# Patient Record
Sex: FEMALE | Race: BLACK OR AFRICAN AMERICAN | Employment: FULL TIME | URBAN - METROPOLITAN AREA
[De-identification: names, ages, dates, MRNs, and addresses within clinical notes are randomized per-mention and may not be internally consistent; named-entity substitution may affect disease eponyms.]

---

## 2018-08-07 ENCOUNTER — APPOINTMENT (OUTPATIENT)
Dept: GENERAL RADIOLOGY | Age: 68
End: 2018-08-07
Attending: EMERGENCY MEDICINE
Payer: COMMERCIAL

## 2018-08-07 ENCOUNTER — APPOINTMENT (OUTPATIENT)
Dept: CT IMAGING | Age: 68
End: 2018-08-07
Attending: EMERGENCY MEDICINE
Payer: COMMERCIAL

## 2018-08-07 ENCOUNTER — HOSPITAL ENCOUNTER (EMERGENCY)
Age: 68
Discharge: HOME | End: 2018-08-08
Attending: EMERGENCY MEDICINE
Payer: COMMERCIAL

## 2018-08-07 DIAGNOSIS — N30.00 ACUTE CYSTITIS WITHOUT HEMATURIA: Primary | ICD-10-CM

## 2018-08-07 DIAGNOSIS — D72.829 LEUKOCYTOSIS, UNSPECIFIED TYPE: ICD-10-CM

## 2018-08-07 DIAGNOSIS — A59.9 TRICHIMONIASIS: ICD-10-CM

## 2018-08-07 DIAGNOSIS — R51.9 NONINTRACTABLE HEADACHE, UNSPECIFIED CHRONICITY PATTERN, UNSPECIFIED HEADACHE TYPE: ICD-10-CM

## 2018-08-07 LAB
ALBUMIN SERPL-MCNC: 3.4 G/DL (ref 3.5–5)
ALBUMIN/GLOB SERPL: 0.7 {RATIO} (ref 1.1–2.2)
ALP SERPL-CCNC: 128 U/L (ref 45–117)
ALT SERPL-CCNC: 36 U/L (ref 12–78)
ANION GAP SERPL CALC-SCNC: 11 MMOL/L (ref 5–15)
AST SERPL-CCNC: 27 U/L (ref 15–37)
BASOPHILS # BLD: 0 K/UL (ref 0–0.1)
BASOPHILS NFR BLD: 0 % (ref 0–1)
BILIRUB SERPL-MCNC: 1.1 MG/DL (ref 0.2–1)
BUN SERPL-MCNC: 12 MG/DL (ref 6–20)
BUN/CREAT SERPL: 13 (ref 12–20)
CALCIUM SERPL-MCNC: 9.2 MG/DL (ref 8.5–10.1)
CHLORIDE SERPL-SCNC: 101 MMOL/L (ref 97–108)
CO2 SERPL-SCNC: 25 MMOL/L (ref 21–32)
CREAT SERPL-MCNC: 0.96 MG/DL (ref 0.55–1.02)
DIFFERENTIAL METHOD BLD: ABNORMAL
EOSINOPHIL # BLD: 0 K/UL (ref 0–0.4)
EOSINOPHIL NFR BLD: 0 % (ref 0–7)
ERYTHROCYTE [DISTWIDTH] IN BLOOD BY AUTOMATED COUNT: 12.7 % (ref 11.5–14.5)
GLOBULIN SER CALC-MCNC: 4.8 G/DL (ref 2–4)
GLUCOSE SERPL-MCNC: 137 MG/DL (ref 65–100)
HCT VFR BLD AUTO: 41.9 % (ref 35–47)
HGB BLD-MCNC: 14.3 G/DL (ref 11.5–16)
IMM GRANULOCYTES # BLD: 0.1 K/UL (ref 0–0.04)
IMM GRANULOCYTES NFR BLD AUTO: 0 % (ref 0–0.5)
LACTATE SERPL-SCNC: 0.9 MMOL/L (ref 0.4–2)
LYMPHOCYTES # BLD: 1 K/UL (ref 0.8–3.5)
LYMPHOCYTES NFR BLD: 6 % (ref 12–49)
MCH RBC QN AUTO: 30.9 PG (ref 26–34)
MCHC RBC AUTO-ENTMCNC: 34.1 G/DL (ref 30–36.5)
MCV RBC AUTO: 90.5 FL (ref 80–99)
MONOCYTES # BLD: 1.3 K/UL (ref 0–1)
MONOCYTES NFR BLD: 9 % (ref 5–13)
NEUTS SEG # BLD: 13.2 K/UL (ref 1.8–8)
NEUTS SEG NFR BLD: 85 % (ref 32–75)
NRBC # BLD: 0 K/UL (ref 0–0.01)
NRBC BLD-RTO: 0 PER 100 WBC
PLATELET # BLD AUTO: 219 K/UL (ref 150–400)
PMV BLD AUTO: 11.5 FL (ref 8.9–12.9)
POTASSIUM SERPL-SCNC: 3.6 MMOL/L (ref 3.5–5.1)
PROT SERPL-MCNC: 8.2 G/DL (ref 6.4–8.2)
RBC # BLD AUTO: 4.63 M/UL (ref 3.8–5.2)
SODIUM SERPL-SCNC: 137 MMOL/L (ref 136–145)
UR CULT HOLD, URHOLD: NORMAL
WBC # BLD AUTO: 15.6 K/UL (ref 3.6–11)

## 2018-08-07 PROCEDURE — 70450 CT HEAD/BRAIN W/O DYE: CPT

## 2018-08-07 PROCEDURE — 96361 HYDRATE IV INFUSION ADD-ON: CPT

## 2018-08-07 PROCEDURE — 83605 ASSAY OF LACTIC ACID: CPT | Performed by: EMERGENCY MEDICINE

## 2018-08-07 PROCEDURE — 96375 TX/PRO/DX INJ NEW DRUG ADDON: CPT

## 2018-08-07 PROCEDURE — 74011250636 HC RX REV CODE- 250/636: Performed by: EMERGENCY MEDICINE

## 2018-08-07 PROCEDURE — 74011250637 HC RX REV CODE- 250/637: Performed by: EMERGENCY MEDICINE

## 2018-08-07 PROCEDURE — 36415 COLL VENOUS BLD VENIPUNCTURE: CPT | Performed by: EMERGENCY MEDICINE

## 2018-08-07 PROCEDURE — 85025 COMPLETE CBC W/AUTO DIFF WBC: CPT | Performed by: EMERGENCY MEDICINE

## 2018-08-07 PROCEDURE — 81001 URINALYSIS AUTO W/SCOPE: CPT | Performed by: EMERGENCY MEDICINE

## 2018-08-07 PROCEDURE — 80053 COMPREHEN METABOLIC PANEL: CPT | Performed by: EMERGENCY MEDICINE

## 2018-08-07 PROCEDURE — 96374 THER/PROPH/DIAG INJ IV PUSH: CPT

## 2018-08-07 PROCEDURE — 99284 EMERGENCY DEPT VISIT MOD MDM: CPT

## 2018-08-07 PROCEDURE — 71046 X-RAY EXAM CHEST 2 VIEWS: CPT

## 2018-08-07 PROCEDURE — 87086 URINE CULTURE/COLONY COUNT: CPT | Performed by: EMERGENCY MEDICINE

## 2018-08-07 PROCEDURE — 87040 BLOOD CULTURE FOR BACTERIA: CPT | Performed by: EMERGENCY MEDICINE

## 2018-08-07 PROCEDURE — 87186 SC STD MICRODIL/AGAR DIL: CPT | Performed by: EMERGENCY MEDICINE

## 2018-08-07 RX ORDER — BUTALBITAL, ACETAMINOPHEN AND CAFFEINE 50; 325; 40 MG/1; MG/1; MG/1
1 TABLET ORAL
Status: DISCONTINUED | OUTPATIENT
Start: 2018-08-07 | End: 2018-08-07

## 2018-08-07 RX ORDER — BUTALBITAL, ACETAMINOPHEN AND CAFFEINE 50; 325; 40 MG/1; MG/1; MG/1
2 TABLET ORAL
Status: COMPLETED | OUTPATIENT
Start: 2018-08-07 | End: 2018-08-07

## 2018-08-07 RX ORDER — SODIUM CHLORIDE 0.9 % (FLUSH) 0.9 %
5-10 SYRINGE (ML) INJECTION AS NEEDED
Status: DISCONTINUED | OUTPATIENT
Start: 2018-08-07 | End: 2018-08-08 | Stop reason: HOSPADM

## 2018-08-07 RX ORDER — ONDANSETRON 2 MG/ML
4 INJECTION INTRAMUSCULAR; INTRAVENOUS
Status: COMPLETED | OUTPATIENT
Start: 2018-08-07 | End: 2018-08-07

## 2018-08-07 RX ORDER — KETOROLAC TROMETHAMINE 30 MG/ML
15 INJECTION, SOLUTION INTRAMUSCULAR; INTRAVENOUS
Status: COMPLETED | OUTPATIENT
Start: 2018-08-07 | End: 2018-08-07

## 2018-08-07 RX ORDER — SODIUM CHLORIDE 0.9 % (FLUSH) 0.9 %
5-10 SYRINGE (ML) INJECTION EVERY 8 HOURS
Status: DISCONTINUED | OUTPATIENT
Start: 2018-08-07 | End: 2018-08-08 | Stop reason: HOSPADM

## 2018-08-07 RX ORDER — FEXOFENADINE HCL AND PSEUDOEPHEDRINE HCI 180; 240 MG/1; MG/1
1 TABLET, EXTENDED RELEASE ORAL DAILY
COMMUNITY
End: 2018-08-30

## 2018-08-07 RX ORDER — DEXAMETHASONE SODIUM PHOSPHATE 10 MG/ML
10 INJECTION INTRAMUSCULAR; INTRAVENOUS
Status: COMPLETED | OUTPATIENT
Start: 2018-08-07 | End: 2018-08-07

## 2018-08-07 RX ADMIN — KETOROLAC TROMETHAMINE 15 MG: 30 INJECTION, SOLUTION INTRAMUSCULAR at 22:36

## 2018-08-07 RX ADMIN — BUTALBITAL, ACETAMINOPHEN, AND CAFFEINE 2 TABLET: 50; 325; 40 TABLET ORAL at 23:25

## 2018-08-07 RX ADMIN — DEXAMETHASONE SODIUM PHOSPHATE 10 MG: 10 INJECTION, SOLUTION INTRAMUSCULAR; INTRAVENOUS at 22:36

## 2018-08-07 RX ADMIN — SODIUM CHLORIDE 1000 ML: 900 INJECTION, SOLUTION INTRAVENOUS at 22:32

## 2018-08-07 RX ADMIN — ONDANSETRON 4 MG: 2 INJECTION INTRAMUSCULAR; INTRAVENOUS at 22:36

## 2018-08-07 RX ADMIN — Medication 10 ML: at 22:32

## 2018-08-08 VITALS
TEMPERATURE: 100 F | DIASTOLIC BLOOD PRESSURE: 71 MMHG | HEART RATE: 86 BPM | RESPIRATION RATE: 16 BRPM | HEIGHT: 69 IN | BODY MASS INDEX: 31.74 KG/M2 | WEIGHT: 214.29 LBS | OXYGEN SATURATION: 94 % | SYSTOLIC BLOOD PRESSURE: 134 MMHG

## 2018-08-08 LAB
APPEARANCE UR: ABNORMAL
BACTERIA URNS QL MICRO: ABNORMAL /HPF
BILIRUB UR QL CFM: NEGATIVE
COLOR UR: ABNORMAL
EPITH CASTS URNS QL MICRO: ABNORMAL /LPF
GLUCOSE UR STRIP.AUTO-MCNC: NEGATIVE MG/DL
HGB UR QL STRIP: ABNORMAL
KETONES UR QL STRIP.AUTO: 40 MG/DL
LEUKOCYTE ESTERASE UR QL STRIP.AUTO: ABNORMAL
NITRITE UR QL STRIP.AUTO: NEGATIVE
PH UR STRIP: 5 [PH] (ref 5–8)
PROT UR STRIP-MCNC: 30 MG/DL
RBC #/AREA URNS HPF: ABNORMAL /HPF (ref 0–5)
SP GR UR REFRACTOMETRY: 1.02 (ref 1–1.03)
TRICHOMONAS UR QL MICRO: PRESENT
UROBILINOGEN UR QL STRIP.AUTO: 2 EU/DL (ref 0.2–1)
WBC URNS QL MICRO: ABNORMAL /HPF (ref 0–4)

## 2018-08-08 PROCEDURE — 74011250637 HC RX REV CODE- 250/637: Performed by: EMERGENCY MEDICINE

## 2018-08-08 PROCEDURE — 74011250636 HC RX REV CODE- 250/636: Performed by: EMERGENCY MEDICINE

## 2018-08-08 PROCEDURE — A4216 STERILE WATER/SALINE, 10 ML: HCPCS | Performed by: EMERGENCY MEDICINE

## 2018-08-08 PROCEDURE — 96375 TX/PRO/DX INJ NEW DRUG ADDON: CPT

## 2018-08-08 RX ORDER — CEPHALEXIN 500 MG/1
500 CAPSULE ORAL 3 TIMES DAILY
Qty: 21 CAP | Refills: 0 | Status: SHIPPED | OUTPATIENT
Start: 2018-08-08 | End: 2018-08-21

## 2018-08-08 RX ORDER — ONDANSETRON 4 MG/1
4 TABLET, ORALLY DISINTEGRATING ORAL
Qty: 20 TAB | Refills: 0 | Status: SHIPPED | OUTPATIENT
Start: 2018-08-08 | End: 2018-08-30

## 2018-08-08 RX ORDER — METRONIDAZOLE 250 MG/1
1000 TABLET ORAL
Status: COMPLETED | OUTPATIENT
Start: 2018-08-08 | End: 2018-08-08

## 2018-08-08 RX ORDER — BUTALBITAL, ACETAMINOPHEN AND CAFFEINE 300; 40; 50 MG/1; MG/1; MG/1
1 CAPSULE ORAL
Qty: 20 CAP | Refills: 0 | Status: SHIPPED | OUTPATIENT
Start: 2018-08-08 | End: 2018-08-30

## 2018-08-08 RX ADMIN — METRONIDAZOLE 1000 MG: 250 TABLET ORAL at 00:31

## 2018-08-08 RX ADMIN — CEFTRIAXONE SODIUM 1 G: 1 INJECTION, POWDER, FOR SOLUTION INTRAMUSCULAR; INTRAVENOUS at 00:31

## 2018-08-08 NOTE — ED TRIAGE NOTES
Headache x4 days with chills starting Saturday. Patient has tried  Taking advil and aleve for relief.

## 2018-08-08 NOTE — ED PROVIDER NOTES
HPI Comments: 76 y.o. female with no significant past medical history who presents from home via personal vehicle with chief complaint of headache. Pt reports to have a headache for 4 days. Pt notes some chills as well. Pt reports finally taking some Advil migraine with no relief today. Pt states she feels like she needs to throw up but cannot. Pt affirms cough, nausea, jaw pain, and vomiting. Pt denies fever, allergies, and abdominal pain. Pt notes she takes allegra regularly. There are no other acute medical concerns at this time. Social hx: never smoker. PCP: No primary care provider on file. Note written by hillary Rodriguez, as dictated by Joceline Horta DO 10:24 PM      The history is provided by the patient. No  was used. History reviewed. No pertinent past medical history. History reviewed. No pertinent surgical history. History reviewed. No pertinent family history. Social History     Social History    Marital status: SINGLE     Spouse name: N/A    Number of children: N/A    Years of education: N/A     Occupational History    Not on file. Social History Main Topics    Smoking status: Never Smoker    Smokeless tobacco: Never Used    Alcohol use No    Drug use: Not on file    Sexual activity: Not on file     Other Topics Concern    Not on file     Social History Narrative    No narrative on file     ALLERGIES: Morphine    Review of Systems   Constitutional: Positive for chills. Negative for appetite change, fever and unexpected weight change. HENT: Negative for ear pain, hearing loss, rhinorrhea and trouble swallowing. Jaw pain     Eyes: Negative for pain and visual disturbance. Respiratory: Positive for cough. Negative for chest tightness and shortness of breath. Cardiovascular: Negative for chest pain and palpitations. Gastrointestinal: Positive for nausea.  Negative for abdominal distention, abdominal pain, blood in stool and vomiting. Genitourinary: Negative for dysuria, hematuria and urgency. Musculoskeletal: Negative for back pain and myalgias. Skin: Negative for rash. Neurological: Positive for headaches. Negative for dizziness, syncope, weakness and numbness. Psychiatric/Behavioral: Negative for confusion and suicidal ideas. All other systems reviewed and are negative. Vitals:    08/07/18 2330 08/08/18 0001 08/08/18 0012 08/08/18 0050   BP: 134/71      Pulse: 86      Resp:       Temp:       SpO2: 98% 95% 95% 94%   Weight:       Height:                Physical Exam   Constitutional: She is oriented to person, place, and time. She appears well-developed and well-nourished. No distress. HENT:   Head: Normocephalic and atraumatic. Right Ear: External ear normal.   Left Ear: External ear normal.   Nose: Nose normal.   Mouth/Throat: Oropharynx is clear and moist. No oropharyngeal exudate. Dry barrington mucous. Eyes: Conjunctivae and EOM are normal. Pupils are equal, round, and reactive to light. Right eye exhibits no discharge. Left eye exhibits no discharge. No scleral icterus. Neck: Normal range of motion. Neck supple. No JVD present. No tracheal deviation present. No Brudzinski's sign and no Kernig's sign noted. Cardiovascular: Regular rhythm, normal heart sounds and intact distal pulses. Tachycardia present. Exam reveals no gallop and no friction rub. No murmur heard. Pulmonary/Chest: Effort normal and breath sounds normal. No stridor. No respiratory distress. She has no decreased breath sounds. She has no wheezes. She has no rhonchi. She has no rales. She exhibits no tenderness. Abdominal: Soft. Bowel sounds are normal. She exhibits no distension. There is no tenderness. There is no rebound and no guarding. Musculoskeletal: Normal range of motion. She exhibits no edema or tenderness. Neurological: She is alert and oriented to person, place, and time.  She has normal strength and normal reflexes. No cranial nerve deficit or sensory deficit. She exhibits normal muscle tone. Coordination normal. GCS eye subscore is 4. GCS verbal subscore is 5. GCS motor subscore is 6. No meningeal signs. Skin: Skin is warm and dry. No rash noted. She is not diaphoretic. No erythema. No pallor. Psychiatric: She has a normal mood and affect. Her behavior is normal. Judgment and thought content normal.   Nursing note and vitals reviewed. Note written by hillary Oconnor, as dictated by No att. providers found 10:24 PM    MDM  Number of Diagnoses or Management Options  Acute cystitis without hematuria:   Leukocytosis, unspecified type:   Nonintractable headache, unspecified chronicity pattern, unspecified headache type:   Trichimoniasis:      Amount and/or Complexity of Data Reviewed  Clinical lab tests: ordered and reviewed  Tests in the radiology section of CPT®: ordered and reviewed    Risk of Complications, Morbidity, and/or Mortality  Presenting problems: moderate  Diagnostic procedures: moderate  Management options: moderate    Patient Progress  Patient progress: improved        ED Course       Procedures    Chief Complaint   Patient presents with    Headache       The patient's presenting problems have been discussed, and they are in agreement with the care plan formulated and outlined with them. I have encouraged them to ask questions as they arise throughout their visit.     MEDICATIONS GIVEN:  Medications   sodium chloride (NS) flush 5-10 mL (10 mL IntraVENous Given 8/7/18 2232)   sodium chloride (NS) flush 5-10 mL (not administered)   sodium chloride 0.9 % bolus infusion 1,000 mL (0 mL IntraVENous IV Completed 8/8/18 0038)   ondansetron (ZOFRAN) injection 4 mg (4 mg IntraVENous Given 8/7/18 2236)   ketorolac (TORADOL) injection 15 mg (15 mg IntraVENous Given 8/7/18 2236)   dexamethasone (PF) (DECADRON) injection 10 mg (10 mg IntraVENous Given 8/7/18 2236) butalbital-acetaminophen-caffeine (FIORICET, ESGIC) -40 mg per tablet 2 Tab (2 Tabs Oral Given 8/7/18 2325)   cefTRIAXone (ROCEPHIN) 1 g in sterile water (preservative free) 10 mL IV syringe (1 g IntraVENous Given 8/8/18 0031)   metroNIDAZOLE (FLAGYL) tablet 1,000 mg (1,000 mg Oral Given 8/8/18 0031)       LABS REVIEWED:  Recent Results (from the past 24 hour(s))   CBC WITH AUTOMATED DIFF    Collection Time: 08/07/18 10:30 PM   Result Value Ref Range    WBC 15.6 (H) 3.6 - 11.0 K/uL    RBC 4.63 3.80 - 5.20 M/uL    HGB 14.3 11.5 - 16.0 g/dL    HCT 41.9 35.0 - 47.0 %    MCV 90.5 80.0 - 99.0 FL    MCH 30.9 26.0 - 34.0 PG    MCHC 34.1 30.0 - 36.5 g/dL    RDW 12.7 11.5 - 14.5 %    PLATELET 295 072 - 540 K/uL    MPV 11.5 8.9 - 12.9 FL    NRBC 0.0 0  WBC    ABSOLUTE NRBC 0.00 0.00 - 0.01 K/uL    NEUTROPHILS 85 (H) 32 - 75 %    LYMPHOCYTES 6 (L) 12 - 49 %    MONOCYTES 9 5 - 13 %    EOSINOPHILS 0 0 - 7 %    BASOPHILS 0 0 - 1 %    IMMATURE GRANULOCYTES 0 0.0 - 0.5 %    ABS. NEUTROPHILS 13.2 (H) 1.8 - 8.0 K/UL    ABS. LYMPHOCYTES 1.0 0.8 - 3.5 K/UL    ABS. MONOCYTES 1.3 (H) 0.0 - 1.0 K/UL    ABS. EOSINOPHILS 0.0 0.0 - 0.4 K/UL    ABS. BASOPHILS 0.0 0.0 - 0.1 K/UL    ABS. IMM. GRANS. 0.1 (H) 0.00 - 0.04 K/UL    DF AUTOMATED     METABOLIC PANEL, COMPREHENSIVE    Collection Time: 08/07/18 10:30 PM   Result Value Ref Range    Sodium 137 136 - 145 mmol/L    Potassium 3.6 3.5 - 5.1 mmol/L    Chloride 101 97 - 108 mmol/L    CO2 25 21 - 32 mmol/L    Anion gap 11 5 - 15 mmol/L    Glucose 137 (H) 65 - 100 mg/dL    BUN 12 6 - 20 MG/DL    Creatinine 0.96 0.55 - 1.02 MG/DL    BUN/Creatinine ratio 13 12 - 20      GFR est AA >60 >60 ml/min/1.73m2    GFR est non-AA 58 (L) >60 ml/min/1.73m2    Calcium 9.2 8.5 - 10.1 MG/DL    Bilirubin, total 1.1 (H) 0.2 - 1.0 MG/DL    ALT (SGPT) 36 12 - 78 U/L    AST (SGOT) 27 15 - 37 U/L    Alk.  phosphatase 128 (H) 45 - 117 U/L    Protein, total 8.2 6.4 - 8.2 g/dL    Albumin 3.4 (L) 3.5 - 5.0 g/dL    Globulin 4.8 (H) 2.0 - 4.0 g/dL    A-G Ratio 0.7 (L) 1.1 - 2.2     LACTIC ACID    Collection Time: 08/07/18 10:30 PM   Result Value Ref Range    Lactic acid 0.9 0.4 - 2.0 MMOL/L   URINALYSIS W/MICROSCOPIC    Collection Time: 08/07/18 11:46 PM   Result Value Ref Range    Color DARK YELLOW      Appearance CLOUDY (A) CLEAR      Specific gravity 1.022 1.003 - 1.030      pH (UA) 5.0 5.0 - 8.0      Protein 30 (A) NEG mg/dL    Glucose NEGATIVE  NEG mg/dL    Ketone 40 (A) NEG mg/dL    Blood SMALL (A) NEG      Urobilinogen 2.0 (H) 0.2 - 1.0 EU/dL    Nitrites NEGATIVE  NEG      Leukocyte Esterase LARGE (A) NEG      WBC 20-50 0 - 4 /hpf    RBC 0-5 0 - 5 /hpf    Epithelial cells MODERATE (A) FEW /lpf    Bacteria 1+ (A) NEG /hpf    Trichomonas PRESENT (A) NEG     URINE CULTURE HOLD SAMPLE    Collection Time: 08/07/18 11:46 PM   Result Value Ref Range    Urine culture hold        URINE ON HOLD IN MICROBIOLOGY DEPT FOR 3 DAYS. IF UNPRESERVED URINE IS SUBMITTED, IT CANNOT BE USED FOR ADDITIONAL TESTING AFTER 24 HRS, RECOLLECTION WILL BE REQUIRED. BILIRUBIN, CONFIRM    Collection Time: 08/07/18 11:46 PM   Result Value Ref Range    Bilirubin UA, confirm NEGATIVE  NEG         VITAL SIGNS:  Patient Vitals for the past 12 hrs:   Temp Pulse Resp BP SpO2   08/08/18 0050 - - - - 94 %   08/08/18 0012 - - - - 95 %   08/08/18 0001 - - - - 95 %   08/07/18 2330 - 86 - 134/71 98 %   08/07/18 2315 - - - 154/79 -   08/07/18 2300 - - - 156/86 -   08/07/18 2245 - - - 157/81 -   08/07/18 2236 100 °F (37.8 °C) - - - -   08/07/18 2210 100.2 °F (37.9 °C) (!) 121 16 178/81 96 %       RADIOLOGY RESULTS:  The following have been ordered and reviewed:  Xr Chest Pa Lat    Result Date: 8/8/2018  History: Fever, headache. Frontal and lateral views of the chest show clear lungs. There is mild cardiomegaly. There is atherosclerosis of the aorta. The bony thorax is unremarkable. IMPRESSION: No acute findings.      Ct Head Wo Cont    Result Date: 8/7/2018  EXAM:  CT HEAD WO CONT INDICATION:   headache for 4 cays with chills starting Saturday COMPARISON: None. CONTRAST:  None. TECHNIQUE: Unenhanced CT of the head was performed using 5 mm images. Brain and bone windows were generated. CT dose reduction was achieved through use of a standardized protocol tailored for this examination and automatic exposure control for dose modulation. FINDINGS: The ventricles and sulci are normal in size, shape and configuration and midline. There is no significant white matter disease. There is no intracranial hemorrhage, extra-axial collection, mass, mass effect or midline shift. The basilar cisterns are open. No acute infarct is identified. The bone windows demonstrate no abnormalities. The visualized portions of the paranasal sinuses and mastoid air cells are clear. IMPRESSION: Normal CT of the head. PROGRESS NOTES:  Pt feeling better. Will treat uti. Discussed results and plan with patient. Patient will be discharged home with PCP follow up. Pt given a list of PCP's in area. Patient instructed to return to the emergency room for any worsening symptoms or any other concerns. DIAGNOSIS:    1. Acute cystitis without hematuria    2. Nonintractable headache, unspecified chronicity pattern, unspecified headache type    3. Trichimoniasis    4.  Leukocytosis, unspecified type        PLAN:  Follow-up Information     Follow up With Details Comments Contact Info    None Schedule an appointment as soon as possible for a visit  None [phone]) Patient stated that they have no PCP      [facility] EMERGENCY DEPT  If symptoms worsen [address] [facility]  [phone]        Discharge Medication List as of 8/8/2018 12:46 AM      START taking these medications    Details   butalbital-acetaminophen-caff (FIORICET) -40 mg per capsule Take 1 Cap by mouth every four (4) hours as needed for Pain., Print, Disp-20 Cap, R-0      cephALEXin (KEFLEX) 500 mg capsule Take 1 Cap by mouth three (3) times daily for 7 days. , Print, Disp-21 Cap, R-0      ondansetron (ZOFRAN ODT) 4 mg disintegrating tablet Take 1 Tab by mouth every eight (8) hours as needed for Nausea. , Print, Disp-20 Tab, R-0         CONTINUE these medications which have NOT CHANGED    Details   fexofenadine-pseudoephedrine (ALLEGRA-D 24 HOUR) 180-240 mg per tablet Take 1 Tab by mouth daily. , Historical Med             ED COURSE: The patient's hospital course has been uncomplicated.

## 2018-08-08 NOTE — DISCHARGE INSTRUCTIONS
We hope that we have addressed all of your medical concerns. The examination and treatment you received in the Emergency Department were for an emergent problem and were not intended as complete care. It is important that you follow up with your healthcare provider(s) for ongoing care. If your symptoms worsen or do not improve as expected, and you are unable to reach your usual health care provider(s), you should return to the Emergency Department. Today's healthcare is undergoing tremendous change, and patient satisfaction surveys are one of the many tools to assess the quality of medical care. You may receive a survey from the [facility] regarding your experience in the Emergency Department. I hope that your experience has been completely positive, particularly the medical care that I provided. As such, please participate in the survey; anything less than excellent does not meet my expectations or intentions. [facility] and 8 [facility] participate in nationally recognized quality of care measures. If your blood pressure is greater than 120/80, as reported below, we urge that you seek medical care to address the potential of high blood pressure, commonly known as hypertension. Hypertension can be hereditary or can be caused by certain medical conditions, pain, stress, or \"white coat syndrome. \"       Please make an appointment with your health care provider(s) for follow up of your Emergency Department visit. VITALS:   Patient Vitals for the past 8 hrs:   Temp Pulse Resp BP SpO2   08/08/18 0012 - - - - 95 %   08/08/18 0001 - - - - 95 %   08/07/18 2330 - 86 - 134/71 98 %   08/07/18 2315 - - - 154/79 -   08/07/18 2300 - - - 156/86 -   08/07/18 2245 - - - 157/81 -   08/07/18 2236 100 °F (37.8 °C) - - - -   08/07/18 2210 100.2 °F (37.9 °C) (!) 121 16 178/81 96 %          Thank you for allowing us to provide you with medical care today. We realize that you have many choices for your emergency care needs. Please choose us in the future for any continued health care needs. Roddy Ramírez [address]   Office: [phone]            Recent Results (from the past 24 hour(s))   CBC WITH AUTOMATED DIFF    Collection Time: 08/07/18 10:30 PM   Result Value Ref Range    WBC 15.6 (H) 3.6 - 11.0 K/uL    RBC 4.63 3.80 - 5.20 M/uL    HGB 14.3 11.5 - 16.0 g/dL    HCT 41.9 35.0 - 47.0 %    MCV 90.5 80.0 - 99.0 FL    MCH 30.9 26.0 - 34.0 PG    MCHC 34.1 30.0 - 36.5 g/dL    RDW 12.7 11.5 - 14.5 %    PLATELET 351 077 - 191 K/uL    MPV 11.5 8.9 - 12.9 FL    NRBC 0.0 0  WBC    ABSOLUTE NRBC 0.00 0.00 - 0.01 K/uL    NEUTROPHILS 85 (H) 32 - 75 %    LYMPHOCYTES 6 (L) 12 - 49 %    MONOCYTES 9 5 - 13 %    EOSINOPHILS 0 0 - 7 %    BASOPHILS 0 0 - 1 %    IMMATURE GRANULOCYTES 0 0.0 - 0.5 %    ABS. NEUTROPHILS 13.2 (H) 1.8 - 8.0 K/UL    ABS. LYMPHOCYTES 1.0 0.8 - 3.5 K/UL    ABS. MONOCYTES 1.3 (H) 0.0 - 1.0 K/UL    ABS. EOSINOPHILS 0.0 0.0 - 0.4 K/UL    ABS. BASOPHILS 0.0 0.0 - 0.1 K/UL    ABS. IMM. GRANS. 0.1 (H) 0.00 - 0.04 K/UL    DF AUTOMATED     METABOLIC PANEL, COMPREHENSIVE    Collection Time: 08/07/18 10:30 PM   Result Value Ref Range    Sodium 137 136 - 145 mmol/L    Potassium 3.6 3.5 - 5.1 mmol/L    Chloride 101 97 - 108 mmol/L    CO2 25 21 - 32 mmol/L    Anion gap 11 5 - 15 mmol/L    Glucose 137 (H) 65 - 100 mg/dL    BUN 12 6 - 20 MG/DL    Creatinine 0.96 0.55 - 1.02 MG/DL    BUN/Creatinine ratio 13 12 - 20      GFR est AA >60 >60 ml/min/1.73m2    GFR est non-AA 58 (L) >60 ml/min/1.73m2    Calcium 9.2 8.5 - 10.1 MG/DL    Bilirubin, total 1.1 (H) 0.2 - 1.0 MG/DL    ALT (SGPT) 36 12 - 78 U/L    AST (SGOT) 27 15 - 37 U/L    Alk.  phosphatase 128 (H) 45 - 117 U/L    Protein, total 8.2 6.4 - 8.2 g/dL    Albumin 3.4 (L) 3.5 - 5.0 g/dL    Globulin 4.8 (H) 2.0 - 4.0 g/dL    A-G Ratio 0.7 (L) 1.1 - 2.2 LACTIC ACID    Collection Time: 08/07/18 10:30 PM   Result Value Ref Range    Lactic acid 0.9 0.4 - 2.0 MMOL/L   URINALYSIS W/MICROSCOPIC    Collection Time: 08/07/18 11:46 PM   Result Value Ref Range    Color DARK YELLOW      Appearance CLOUDY (A) CLEAR      Specific gravity 1.022 1.003 - 1.030      pH (UA) 5.0 5.0 - 8.0      Protein 30 (A) NEG mg/dL    Glucose NEGATIVE  NEG mg/dL    Ketone 40 (A) NEG mg/dL    Blood SMALL (A) NEG      Urobilinogen 2.0 (H) 0.2 - 1.0 EU/dL    Nitrites NEGATIVE  NEG      Leukocyte Esterase LARGE (A) NEG      WBC 20-50 0 - 4 /hpf    RBC 0-5 0 - 5 /hpf    Epithelial cells MODERATE (A) FEW /lpf    Bacteria 1+ (A) NEG /hpf    Trichomonas PRESENT (A) NEG     URINE CULTURE HOLD SAMPLE    Collection Time: 08/07/18 11:46 PM   Result Value Ref Range    Urine culture hold        URINE ON HOLD IN MICROBIOLOGY DEPT FOR 3 DAYS. IF UNPRESERVED URINE IS SUBMITTED, IT CANNOT BE USED FOR ADDITIONAL TESTING AFTER 24 HRS, RECOLLECTION WILL BE REQUIRED. BILIRUBIN, CONFIRM    Collection Time: 08/07/18 11:46 PM   Result Value Ref Range    Bilirubin UA, confirm NEGATIVE  NEG         Xr Chest Pa Lat    Result Date: 8/8/2018  History: Fever, headache. Frontal and lateral views of the chest show clear lungs. There is mild cardiomegaly. There is atherosclerosis of the aorta. The bony thorax is unremarkable. IMPRESSION: No acute findings. Ct Head Wo Cont    Result Date: 8/7/2018  EXAM:  CT HEAD WO CONT INDICATION:   headache for 4 cays with chills starting Saturday COMPARISON: None. CONTRAST:  None. TECHNIQUE: Unenhanced CT of the head was performed using 5 mm images. Brain and bone windows were generated. CT dose reduction was achieved through use of a standardized protocol tailored for this examination and automatic exposure control for dose modulation. FINDINGS: The ventricles and sulci are normal in size, shape and configuration and midline. There is no significant white matter disease.  There is no intracranial hemorrhage, extra-axial collection, mass, mass effect or midline shift. The basilar cisterns are open. No acute infarct is identified. The bone windows demonstrate no abnormalities. The visualized portions of the paranasal sinuses and mastoid air cells are clear. IMPRESSION: Normal CT of the head. Urinary Tract Infection in Women: Care Instructions  Your Care Instructions    A urinary tract infection, or UTI, is a general term for an infection anywhere between the kidneys and the urethra (where urine comes out). Most UTIs are bladder infections. They often cause pain or burning when you urinate. UTIs are caused by bacteria and can be cured with antibiotics. Be sure to complete your treatment so that the infection goes away. Follow-up care is a key part of your treatment and safety. Be sure to make and go to all appointments, and call your doctor if you are having problems. It's also a good idea to know your test results and keep a list of the medicines you take. How can you care for yourself at home? · Take your antibiotics as directed. Do not stop taking them just because you feel better. You need to take the full course of antibiotics. · Drink extra water and other fluids for the next day or two. This may help wash out the bacteria that are causing the infection. (If you have kidney, heart, or liver disease and have to limit fluids, talk with your doctor before you increase your fluid intake.)  · Avoid drinks that are carbonated or have caffeine. They can irritate the bladder. · Urinate often. Try to empty your bladder each time. · To relieve pain, take a hot bath or lay a heating pad set on low over your lower belly or genital area. Never go to sleep with a heating pad in place. To prevent UTIs  · Drink plenty of water each day. This helps you urinate often, which clears bacteria from your system.  (If you have kidney, heart, or liver disease and have to limit fluids, talk with your doctor before you increase your fluid intake.)  · Urinate when you need to. · Urinate right after you have sex. · Change sanitary pads often. · Avoid douches, bubble baths, feminine hygiene sprays, and other feminine hygiene products that have deodorants. · After going to the bathroom, wipe from front to back. When should you call for help? Call your doctor now or seek immediate medical care if:    · Symptoms such as fever, chills, nausea, or vomiting get worse or appear for the first time.     · You have new pain in your back just below your rib cage. This is called flank pain.     · There is new blood or pus in your urine.     · You have any problems with your antibiotic medicine.    Watch closely for changes in your health, and be sure to contact your doctor if:    · You are not getting better after taking an antibiotic for 2 days.     · Your symptoms go away but then come back. Where can you learn more? Go to http://hao-tsering.info/. Enter F241 in the search box to learn more about \"Urinary Tract Infection in Women: Care Instructions. \"  Current as of: May 12, 2017  Content Version: 11.7  © 3286-8390 [employer]. Care instructions adapted under license by [employer] (which disclaims liability or warranty for this information). If you have questions about a medical condition or this instruction, always ask your healthcare professional. [place] [address] any warranty or liability for your use of this information. Headache: Care Instructions  Your Care Instructions    Headaches have many possible causes. Most headaches aren't a sign of a more serious problem, and they will get better on their own. Home treatment may help you feel better faster. The doctor has checked you carefully, but problems can develop later. If you notice any problems or new symptoms, get medical treatment right away.   Follow-up care is a key part of your treatment and safety. Be sure to make and go to all appointments, and call your doctor if you are having problems. It's also a good idea to know your test results and keep a list of the medicines you take. How can you care for yourself at home? · Do not drive if you have taken a prescription pain medicine. · Rest in a quiet, dark room until your headache is gone. Close your eyes and try to relax or go to sleep. Don't watch TV or read. · Put a cold, moist cloth or cold pack on the painful area for 10 to 20 minutes at a time. Put a thin cloth between the cold pack and your skin. · Use a warm, moist towel or a heating pad set on low to relax tight shoulder and neck muscles. · Have someone gently massage your neck and shoulders. · Take pain medicines exactly as directed. ¨ If the doctor gave you a prescription medicine for pain, take it as prescribed. ¨ If you are not taking a prescription pain medicine, ask your doctor if you can take an over-the-counter medicine. · Be careful not to take pain medicine more often than the instructions allow, because you may get worse or more frequent headaches when the medicine wears off. · Do not ignore new symptoms that occur with a headache, such as a fever, weakness or numbness, vision changes, or confusion. These may be signs of a more serious problem. To prevent headaches  · Keep a headache diary so you can figure out what triggers your headaches. Avoiding triggers may help you prevent headaches. Record when each headache began, how long it lasted, and what the pain was like (throbbing, aching, stabbing, or dull). Write down any other symptoms you had with the headache, such as nausea, flashing lights or dark spots, or sensitivity to bright light or loud noise. Note if the headache occurred near your period.  List anything that might have triggered the headache, such as certain foods (chocolate, cheese, wine) or odors, smoke, bright light, stress, or lack of sleep.  · Find healthy ways to deal with stress. Headaches are most common during or right after stressful times. Take time to relax before and after you do something that has caused a headache in the past.  · Try to keep your muscles relaxed by keeping good posture. Check your jaw, face, neck, and shoulder muscles for tension, and try relaxing them. When sitting at a desk, change positions often, and stretch for 30 seconds each hour. · Get plenty of sleep and exercise. · Eat regularly and well. Long periods without food can trigger a headache. · Treat yourself to a massage. Some people find that regular massages are very helpful in relieving tension. · Limit caffeine by not drinking too much coffee, tea, or soda. But don't quit caffeine suddenly, because that can also give you headaches. · Reduce eyestrain from computers by blinking frequently and looking away from the computer screen every so often. Make sure you have proper eyewear and that your monitor is set up properly, about an arm's length away. · Seek help if you have depression or anxiety. Your headaches may be linked to these conditions. Treatment can both prevent headaches and help with symptoms of anxiety or depression. When should you call for help? Call 911 anytime you think you may need emergency care. For example, call if:    · You have signs of a stroke. These may include:  ¨ Sudden numbness, paralysis, or weakness in your face, arm, or leg, especially on only one side of your body. ¨ Sudden vision changes. ¨ Sudden trouble speaking. ¨ Sudden confusion or trouble understanding simple statements. ¨ Sudden problems with walking or balance. ¨ A sudden, severe headache that is different from past headaches.    Call your doctor now or seek immediate medical care if:    · You have a new or worse headache.     · Your headache gets much worse. Where can you learn more? Go to http://hao-tsering.info/.   Enter 9530 7826 in the search box to learn more about \"Headache: Care Instructions. \"  Current as of: October 9, 2017  Content Version: 11.7  © 8428-5273 [employer]. Care instructions adapted under license by [employer] (which disclaims liability or warranty for this information). If you have questions about a medical condition or this instruction, always ask your healthcare professional. [address] any warranty or liability for your use of this information. Trichomoniasis: Care Instructions  Your Care Instructions  Trichomoniasis is a sexually transmitted infection (STI) that is spread by having sex with an infected partner. Trichomoniasis is commonly called trich (say \"trick\"). In women, trich may cause vaginal itching and a smelly discharge. But in many cases, especially in men, there are no symptoms. Emigdio Marissa is treated so that you do not spread it to others. Both you and your sex partner or partners should be treated at the same time so you do not infect each other again. Trich may cause problems with pregnancy. Your doctor will talk with you about treatment for Trich if you are pregnant. Follow-up care is a key part of your treatment and safety. Be sure to make and go to all appointments, and call your doctor if you are having problems. It's also a good idea to know your test results and keep a list of the medicines you take. How can you care for yourself at home? · Take your antibiotics as directed. Do not stop taking them just because you feel better. You need to take the full course of antibiotics. · Do not have sex while you are being treated. If your doctor gave you a single dose of antibiotics, do not have sex for one week after being treated and until your partner also has been treated. · Tell your sex partner (or partners) that he or she will also need to be tested and treated. · Use a cold water compress or cool baths to relieve itching.   To prevent trichomoniasis in the future  · Use latex condoms every time you have sex. Use them from the beginning to the end of sexual contact. · Talk to your partner before having sex. Find out if he or she has or is at risk for trich or any other STI. Keep in mind that a person may be able to spread an STI even if he or she does not have symptoms. · Do not have sex if you are being treated for trich or any other STI. · Do not have sex with anyone who has symptoms of an STI, such as sores on the genitals or mouth. · Having one sex partner (who does not have STIs and does not have sex with anyone else) is a good way to avoid STIs. When should you call for help? Call your doctor now or seek immediate medical care if:    · You have unusual vaginal bleeding.     · You have a fever.     · You have new discharge from the vagina or penis.     · You have pelvic pain.    Watch closely for changes in your health, and be sure to contact your doctor if:    · You do not get better as expected.     · You have any new symptoms or your symptoms get worse. Where can you learn more? Go to http://hao-tsering.info/. Enter U076 in the search box to learn more about \"Trichomoniasis: Care Instructions. \"  Current as of: November 27, 2017  Content Version: 11.7  © 1224-5320 [employer]. Care instructions adapted under license by [employer] (which disclaims liability or warranty for this information). If you have questions about a medical condition or this instruction, always ask your healthcare professional. [place] [address] any warranty or liability for your use of this information.

## 2018-08-09 LAB
BACTERIA SPEC CULT: NORMAL
CC UR VC: NORMAL
SERVICE CMNT-IMP: NORMAL

## 2018-08-09 NOTE — PROGRESS NOTES
Lab at [facility] just called and said 1/2 blood culture bottles have grown gram positive cocci in chains. I called patient's mobile phone number to see how she's doing, name was verified on the machine, left a message to have her call back to discuss results.    Sherice Saldaña PA-C

## 2018-08-09 NOTE — ED NOTES
Lab at [facility] called and said 1/2 blood culture bottles have grown gram positive cocci in chains. I called patient's mobile phone number, name was verified on the machine, left a message to have her call back to discuss results.    Garfield Trevino PA-C

## 2018-08-10 ENCOUNTER — HOSPITAL ENCOUNTER (EMERGENCY)
Age: 68
Discharge: HOME | End: 2018-08-10
Attending: EMERGENCY MEDICINE
Payer: COMMERCIAL

## 2018-08-10 VITALS
WEIGHT: 214.51 LBS | DIASTOLIC BLOOD PRESSURE: 68 MMHG | HEIGHT: 68 IN | BODY MASS INDEX: 32.51 KG/M2 | SYSTOLIC BLOOD PRESSURE: 142 MMHG | HEART RATE: 73 BPM | OXYGEN SATURATION: 97 % | TEMPERATURE: 98.3 F | RESPIRATION RATE: 16 BRPM

## 2018-08-10 DIAGNOSIS — R51.9 ACUTE NONINTRACTABLE HEADACHE, UNSPECIFIED HEADACHE TYPE: Primary | ICD-10-CM

## 2018-08-10 LAB
ALBUMIN SERPL-MCNC: 3 G/DL (ref 3.5–5)
ALBUMIN/GLOB SERPL: 0.7 {RATIO} (ref 1.1–2.2)
ALP SERPL-CCNC: 109 U/L (ref 45–117)
ALT SERPL-CCNC: 26 U/L (ref 12–78)
ANION GAP SERPL CALC-SCNC: 10 MMOL/L (ref 5–15)
AST SERPL-CCNC: 21 U/L (ref 15–37)
BASOPHILS # BLD: 0.1 K/UL (ref 0–0.1)
BASOPHILS NFR BLD: 0 % (ref 0–1)
BILIRUB SERPL-MCNC: 0.6 MG/DL (ref 0.2–1)
BUN SERPL-MCNC: 14 MG/DL (ref 6–20)
BUN/CREAT SERPL: 16 (ref 12–20)
CALCIUM SERPL-MCNC: 9 MG/DL (ref 8.5–10.1)
CHLORIDE SERPL-SCNC: 102 MMOL/L (ref 97–108)
CO2 SERPL-SCNC: 24 MMOL/L (ref 21–32)
CREAT SERPL-MCNC: 0.86 MG/DL (ref 0.55–1.02)
DIFFERENTIAL METHOD BLD: ABNORMAL
EOSINOPHIL # BLD: 0 K/UL (ref 0–0.4)
EOSINOPHIL NFR BLD: 0 % (ref 0–7)
ERYTHROCYTE [DISTWIDTH] IN BLOOD BY AUTOMATED COUNT: 12.6 % (ref 11.5–14.5)
GLOBULIN SER CALC-MCNC: 4.5 G/DL (ref 2–4)
GLUCOSE SERPL-MCNC: 99 MG/DL (ref 65–100)
HCT VFR BLD AUTO: 40.2 % (ref 35–47)
HGB BLD-MCNC: 13.6 G/DL (ref 11.5–16)
IMM GRANULOCYTES # BLD: 0.1 K/UL (ref 0–0.04)
IMM GRANULOCYTES NFR BLD AUTO: 1 % (ref 0–0.5)
LYMPHOCYTES # BLD: 3.4 K/UL (ref 0.8–3.5)
LYMPHOCYTES NFR BLD: 25 % (ref 12–49)
MCH RBC QN AUTO: 30.7 PG (ref 26–34)
MCHC RBC AUTO-ENTMCNC: 33.8 G/DL (ref 30–36.5)
MCV RBC AUTO: 90.7 FL (ref 80–99)
MONOCYTES # BLD: 2 K/UL (ref 0–1)
MONOCYTES NFR BLD: 14 % (ref 5–13)
NEUTS SEG # BLD: 8.4 K/UL (ref 1.8–8)
NEUTS SEG NFR BLD: 60 % (ref 32–75)
NRBC # BLD: 0 K/UL (ref 0–0.01)
NRBC BLD-RTO: 0 PER 100 WBC
PLATELET # BLD AUTO: 261 K/UL (ref 150–400)
PMV BLD AUTO: 11 FL (ref 8.9–12.9)
POTASSIUM SERPL-SCNC: 3.4 MMOL/L (ref 3.5–5.1)
PROT SERPL-MCNC: 7.5 G/DL (ref 6.4–8.2)
RBC # BLD AUTO: 4.43 M/UL (ref 3.8–5.2)
SODIUM SERPL-SCNC: 136 MMOL/L (ref 136–145)
WBC # BLD AUTO: 14 K/UL (ref 3.6–11)

## 2018-08-10 PROCEDURE — 36415 COLL VENOUS BLD VENIPUNCTURE: CPT | Performed by: EMERGENCY MEDICINE

## 2018-08-10 PROCEDURE — 74011250636 HC RX REV CODE- 250/636: Performed by: EMERGENCY MEDICINE

## 2018-08-10 PROCEDURE — 99282 EMERGENCY DEPT VISIT SF MDM: CPT

## 2018-08-10 PROCEDURE — 96375 TX/PRO/DX INJ NEW DRUG ADDON: CPT

## 2018-08-10 PROCEDURE — 85025 COMPLETE CBC W/AUTO DIFF WBC: CPT | Performed by: EMERGENCY MEDICINE

## 2018-08-10 PROCEDURE — 80053 COMPREHEN METABOLIC PANEL: CPT | Performed by: EMERGENCY MEDICINE

## 2018-08-10 PROCEDURE — 96361 HYDRATE IV INFUSION ADD-ON: CPT

## 2018-08-10 PROCEDURE — 96374 THER/PROPH/DIAG INJ IV PUSH: CPT

## 2018-08-10 RX ORDER — KETOROLAC TROMETHAMINE 30 MG/ML
15 INJECTION, SOLUTION INTRAMUSCULAR; INTRAVENOUS
Status: COMPLETED | OUTPATIENT
Start: 2018-08-10 | End: 2018-08-10

## 2018-08-10 RX ORDER — METOCLOPRAMIDE 5 MG/1
5 TABLET ORAL
Qty: 12 TAB | Refills: 0 | Status: SHIPPED | OUTPATIENT
Start: 2018-08-10 | End: 2018-08-21

## 2018-08-10 RX ORDER — DEXAMETHASONE SODIUM PHOSPHATE 10 MG/ML
10 INJECTION INTRAMUSCULAR; INTRAVENOUS
Status: COMPLETED | OUTPATIENT
Start: 2018-08-10 | End: 2018-08-10

## 2018-08-10 RX ORDER — DIPHENHYDRAMINE HYDROCHLORIDE 50 MG/ML
25 INJECTION, SOLUTION INTRAMUSCULAR; INTRAVENOUS
Status: COMPLETED | OUTPATIENT
Start: 2018-08-10 | End: 2018-08-10

## 2018-08-10 RX ORDER — METOCLOPRAMIDE HYDROCHLORIDE 5 MG/ML
10 INJECTION INTRAMUSCULAR; INTRAVENOUS
Status: COMPLETED | OUTPATIENT
Start: 2018-08-10 | End: 2018-08-10

## 2018-08-10 RX ADMIN — SODIUM CHLORIDE 1000 ML: 900 INJECTION, SOLUTION INTRAVENOUS at 06:17

## 2018-08-10 RX ADMIN — METOCLOPRAMIDE 10 MG: 5 INJECTION, SOLUTION INTRAMUSCULAR; INTRAVENOUS at 06:17

## 2018-08-10 RX ADMIN — KETOROLAC TROMETHAMINE 15 MG: 30 INJECTION, SOLUTION INTRAMUSCULAR at 06:17

## 2018-08-10 RX ADMIN — DIPHENHYDRAMINE HYDROCHLORIDE 25 MG: 50 INJECTION, SOLUTION INTRAMUSCULAR; INTRAVENOUS at 06:16

## 2018-08-10 RX ADMIN — DEXAMETHASONE SODIUM PHOSPHATE 10 MG: 10 INJECTION, SOLUTION INTRAMUSCULAR; INTRAVENOUS at 06:17

## 2018-08-10 NOTE — DISCHARGE INSTRUCTIONS

## 2018-08-10 NOTE — ED TRIAGE NOTES
Headache for the past few days. Seen here two days ago for the same. The medication prescribed (fioricet) is not working. Also diagnosed with a UTI and taking antibiotics. Awake and oriented x 3. Skin is warm and dry, respirations are even and unlabored.  States she drove herself to the hospital.

## 2018-08-10 NOTE — ED PROVIDER NOTES
HPI Comments: 77-year-old female with no significant past medical history who presents with a headache. Headache has been going on for the past week. She was seen here 2 days prior and had a negative head CT. She was found to have a urinary tract infection at that time. Review of her chart shows that she has a positive blood culture in which for alpha- Streptococcus and gram-positive cocci in chains were growing out of one of 2 bottles. Patient denies any fevers or chills. She denies any urinary symptoms. She denies any nausea or vomiting. She localizes her headache to the bilateral temporal regions. Movement and standing makes it worse. She denies any weakness or numbness. Patient is a 76 y.o. female presenting with headaches. Headache    Pertinent negatives include no fever, no shortness of breath, no nausea and no vomiting. No past medical history on file. No past surgical history on file. No family history on file. Social History     Social History    Marital status: SINGLE     Spouse name: N/A    Number of children: N/A    Years of education: N/A     Occupational History    Not on file. Social History Main Topics    Smoking status: Never Smoker    Smokeless tobacco: Never Used    Alcohol use No    Drug use: Not on file    Sexual activity: Not on file     Other Topics Concern    Not on file     Social History Narrative    No narrative on file         ALLERGIES: Morphine    Review of Systems   Constitutional: Negative for chills and fever. HENT: Negative for ear pain and sore throat. Eyes: Negative for pain. Respiratory: Negative for chest tightness and shortness of breath. Cardiovascular: Negative for chest pain and leg swelling. Gastrointestinal: Negative for abdominal pain, nausea and vomiting. Genitourinary: Negative for dysuria and flank pain. Musculoskeletal: Negative for back pain. Skin: Negative for rash. Neurological: Positive for headaches.    All other systems reviewed and are negative. Vitals:    08/10/18 0544   BP: 142/68   Pulse: 73   Resp: 16   Temp: 98.3 °F (36.8 °C)   SpO2: 97%   Weight: 97.3 kg (214 lb 8.1 oz)   Height: 5' 8\" (1.727 m)            Physical Exam   Constitutional: No distress. HENT:   Head: Normocephalic and atraumatic. Mouth/Throat: Oropharynx is clear and moist.   Eyes: Conjunctivae are normal. Pupils are equal, round, and reactive to light. No scleral icterus. Neck: Neck supple. No tracheal deviation present. No meningismus   Cardiovascular: Normal rate, regular rhythm and intact distal pulses. Pulmonary/Chest: Effort normal. No respiratory distress. She has no wheezes. She has no rales. Abdominal: Soft. She exhibits no distension. There is no tenderness. Genitourinary:   Genitourinary Comments: deferred   Musculoskeletal: She exhibits no edema or deformity. Neurological: She is alert. Alert and Oriented x3, Cranial nerves 2-12 intact, normal motor and sensation, negative Romberg, no pronator drift, normal finger to nose   Skin: Skin is warm and dry. Psychiatric: She has a normal mood and affect. Nursing note and vitals reviewed. MDM  Number of Diagnoses or Management Options  Diagnosis management comments: 61-year-old female presents with headache similar to prior migraines 10 years ago. She also has a positive blood culture which could be a contaminant. Stat having any symptoms of a urinary tract infection or fevers or chills. She denies any stiff neck or meningismus. ED Course   Improved after migraine cocktail and wished to be discharged    Procedures      7:03 AM  The patient has been reevaluated. The patient is ready for discharge. The patient's signs, symptoms, diagnosis, and discharge instructions have been discussed and the patient/ family has conveyed their understanding. The patient is to follow up as recommended or return to the ED should their symptoms worsen.  Plan has been discussed and the patient is in agreement. LABORATORY TESTS:  Labs Reviewed   CBC WITH AUTOMATED DIFF - Abnormal; Notable for the following:        Result Value    WBC 14.0 (*)     MONOCYTES 14 (*)     IMMATURE GRANULOCYTES 1 (*)     ABS. NEUTROPHILS 8.4 (*)     ABS. MONOCYTES 2.0 (*)     ABS. IMM. GRANS. 0.1 (*)     All other components within normal limits   METABOLIC PANEL, COMPREHENSIVE - Abnormal; Notable for the following:     Potassium 3.4 (*)     Albumin 3.0 (*)     Globulin 4.5 (*)     A-G Ratio 0.7 (*)     All other components within normal limits   SAMPLES BEING HELD       IMAGING RESULTS:  No results found. MEDICATIONS GIVEN:  Medications   sodium chloride 0.9 % bolus infusion 1,000 mL (1,000 mL IntraVENous New Bag 8/10/18 0617)   dexamethasone (PF) (DECADRON) injection 10 mg (10 mg IntraVENous Given 8/10/18 0617)   diphenhydrAMINE (BENADRYL) injection 25 mg (25 mg IntraVENous Given 8/10/18 0616)   ketorolac (TORADOL) injection 15 mg (15 mg IntraVENous Given 8/10/18 0617)   metoclopramide HCl (REGLAN) injection 10 mg (10 mg IntraVENous Given 8/10/18 0617)       IMPRESSION:  1. Acute nonintractable headache, unspecified headache type        PLAN:  1. Current Discharge Medication List      START taking these medications    Details   metoclopramide HCl (REGLAN) 5 mg tablet Take 1 Tab by mouth every six (6) hours as needed for Nausea for up to 10 days. Qty: 12 Tab, Refills: 0           2. Follow-up Information     Follow up With Details Comments [address], [place] Schedule an appointment as soon as possible for a visit  [address] [place] [address] [id number] [phone]      [facility] EMERGENCY DEPT  If symptoms worsen or new concerns [address] [place] [address]  [phone]        3. Return to ED for new or worsening symptoms       Cherie Boyd.  Shayy Beck MD

## 2018-08-10 NOTE — ED NOTES
Provider has reviewed discharge instructions with the patient. The patient verbalized understanding. Pt is still in room waiting for daughter to arrive. Pt's IV has been removed.

## 2018-08-13 ENCOUNTER — HOSPITAL ENCOUNTER (INPATIENT)
Age: 68
LOS: 8 days | Discharge: HOME HEALTH | DRG: 872 | End: 2018-08-21
Attending: EMERGENCY MEDICINE | Admitting: INTERNAL MEDICINE
Payer: COMMERCIAL

## 2018-08-13 ENCOUNTER — APPOINTMENT (OUTPATIENT)
Dept: MRI IMAGING | Age: 68
DRG: 872 | End: 2018-08-13
Attending: INTERNAL MEDICINE
Payer: COMMERCIAL

## 2018-08-13 DIAGNOSIS — R51.9 ACUTE INTRACTABLE HEADACHE, UNSPECIFIED HEADACHE TYPE: Primary | ICD-10-CM

## 2018-08-13 DIAGNOSIS — R78.81 BACTEREMIA: ICD-10-CM

## 2018-08-13 DIAGNOSIS — D72.828 OTHER ELEVATED WHITE BLOOD CELL (WBC) COUNT: ICD-10-CM

## 2018-08-13 DIAGNOSIS — R78.81 POSITIVE BLOOD CULTURE: ICD-10-CM

## 2018-08-13 DIAGNOSIS — N39.0 URINARY TRACT INFECTION WITHOUT HEMATURIA, SITE UNSPECIFIED: ICD-10-CM

## 2018-08-13 PROBLEM — A41.9 SEPSIS (HCC): Status: ACTIVE | Noted: 2018-08-13

## 2018-08-13 PROBLEM — D72.829 LEUKOCYTOSIS (LEUCOCYTOSIS): Status: ACTIVE | Noted: 2018-08-13

## 2018-08-13 PROBLEM — R65.10 SIRS (SYSTEMIC INFLAMMATORY RESPONSE SYNDROME) (HCC): Status: ACTIVE | Noted: 2018-08-13

## 2018-08-13 LAB
ALBUMIN SERPL-MCNC: 2.9 G/DL (ref 3.5–5)
ALBUMIN/GLOB SERPL: 0.6 {RATIO} (ref 1.1–2.2)
ALP SERPL-CCNC: 108 U/L (ref 45–117)
ALT SERPL-CCNC: 29 U/L (ref 12–78)
ANION GAP SERPL CALC-SCNC: 7 MMOL/L (ref 5–15)
APPEARANCE UR: CLEAR
AST SERPL-CCNC: 20 U/L (ref 15–37)
BACTERIA URNS QL MICRO: NEGATIVE /HPF
BASOPHILS # BLD: 0 K/UL (ref 0–0.1)
BASOPHILS NFR BLD: 0 % (ref 0–1)
BILIRUB SERPL-MCNC: 0.5 MG/DL (ref 0.2–1)
BILIRUB UR QL: NEGATIVE
BUN SERPL-MCNC: 12 MG/DL (ref 6–20)
BUN/CREAT SERPL: 13 (ref 12–20)
CALCIUM SERPL-MCNC: 9.1 MG/DL (ref 8.5–10.1)
CHLORIDE SERPL-SCNC: 102 MMOL/L (ref 97–108)
CO2 SERPL-SCNC: 28 MMOL/L (ref 21–32)
COLOR UR: ABNORMAL
CREAT SERPL-MCNC: 0.89 MG/DL (ref 0.55–1.02)
DIFFERENTIAL METHOD BLD: ABNORMAL
EOSINOPHIL # BLD: 0 K/UL (ref 0–0.4)
EOSINOPHIL NFR BLD: 0 % (ref 0–7)
EPITH CASTS URNS QL MICRO: ABNORMAL /LPF
ERYTHROCYTE [DISTWIDTH] IN BLOOD BY AUTOMATED COUNT: 12.7 % (ref 11.5–14.5)
GLOBULIN SER CALC-MCNC: 4.8 G/DL (ref 2–4)
GLUCOSE SERPL-MCNC: 109 MG/DL (ref 65–100)
GLUCOSE UR STRIP.AUTO-MCNC: NEGATIVE MG/DL
HCT VFR BLD AUTO: 43.4 % (ref 35–47)
HGB BLD-MCNC: 14.8 G/DL (ref 11.5–16)
HGB UR QL STRIP: NEGATIVE
HYALINE CASTS URNS QL MICRO: ABNORMAL /LPF (ref 0–5)
IMM GRANULOCYTES # BLD: 0.2 K/UL (ref 0–0.04)
IMM GRANULOCYTES NFR BLD AUTO: 1 % (ref 0–0.5)
KETONES UR QL STRIP.AUTO: 15 MG/DL
LACTATE SERPL-SCNC: 1.1 MMOL/L (ref 0.4–2)
LEUKOCYTE ESTERASE UR QL STRIP.AUTO: NEGATIVE
LYMPHOCYTES # BLD: 2.2 K/UL (ref 0.8–3.5)
LYMPHOCYTES NFR BLD: 12 % (ref 12–49)
MCH RBC QN AUTO: 31 PG (ref 26–34)
MCHC RBC AUTO-ENTMCNC: 34.1 G/DL (ref 30–36.5)
MCV RBC AUTO: 90.8 FL (ref 80–99)
MONOCYTES # BLD: 1.1 K/UL (ref 0–1)
MONOCYTES NFR BLD: 6 % (ref 5–13)
NEUTS SEG # BLD: 15.6 K/UL (ref 1.8–8)
NEUTS SEG NFR BLD: 81 % (ref 32–75)
NITRITE UR QL STRIP.AUTO: NEGATIVE
NRBC # BLD: 0 K/UL (ref 0–0.01)
NRBC BLD-RTO: 0 PER 100 WBC
PH UR STRIP: 6 [PH] (ref 5–8)
PLATELET # BLD AUTO: 365 K/UL (ref 150–400)
PMV BLD AUTO: 11.1 FL (ref 8.9–12.9)
POTASSIUM SERPL-SCNC: 4 MMOL/L (ref 3.5–5.1)
PROT SERPL-MCNC: 7.7 G/DL (ref 6.4–8.2)
PROT UR STRIP-MCNC: NEGATIVE MG/DL
RBC # BLD AUTO: 4.78 M/UL (ref 3.8–5.2)
RBC #/AREA URNS HPF: ABNORMAL /HPF (ref 0–5)
SODIUM SERPL-SCNC: 137 MMOL/L (ref 136–145)
SP GR UR REFRACTOMETRY: 1.02 (ref 1–1.03)
UR CULT HOLD, URHOLD: NORMAL
UROBILINOGEN UR QL STRIP.AUTO: 1 EU/DL (ref 0.2–1)
WBC # BLD AUTO: 19.1 K/UL (ref 3.6–11)
WBC URNS QL MICRO: ABNORMAL /HPF (ref 0–4)

## 2018-08-13 PROCEDURE — 65270000029 HC RM PRIVATE

## 2018-08-13 PROCEDURE — 96375 TX/PRO/DX INJ NEW DRUG ADDON: CPT

## 2018-08-13 PROCEDURE — 80053 COMPREHEN METABOLIC PANEL: CPT | Performed by: PHYSICIAN ASSISTANT

## 2018-08-13 PROCEDURE — 74011250636 HC RX REV CODE- 250/636: Performed by: INTERNAL MEDICINE

## 2018-08-13 PROCEDURE — 96374 THER/PROPH/DIAG INJ IV PUSH: CPT

## 2018-08-13 PROCEDURE — 74011250637 HC RX REV CODE- 250/637: Performed by: INTERNAL MEDICINE

## 2018-08-13 PROCEDURE — 87077 CULTURE AEROBIC IDENTIFY: CPT | Performed by: PHYSICIAN ASSISTANT

## 2018-08-13 PROCEDURE — 87086 URINE CULTURE/COLONY COUNT: CPT | Performed by: PHYSICIAN ASSISTANT

## 2018-08-13 PROCEDURE — 83605 ASSAY OF LACTIC ACID: CPT | Performed by: INTERNAL MEDICINE

## 2018-08-13 PROCEDURE — 74011250636 HC RX REV CODE- 250/636: Performed by: PHYSICIAN ASSISTANT

## 2018-08-13 PROCEDURE — 87186 SC STD MICRODIL/AGAR DIL: CPT | Performed by: PHYSICIAN ASSISTANT

## 2018-08-13 PROCEDURE — 85025 COMPLETE CBC W/AUTO DIFF WBC: CPT | Performed by: PHYSICIAN ASSISTANT

## 2018-08-13 PROCEDURE — 81001 URINALYSIS AUTO W/SCOPE: CPT | Performed by: PHYSICIAN ASSISTANT

## 2018-08-13 PROCEDURE — 96361 HYDRATE IV INFUSION ADD-ON: CPT

## 2018-08-13 PROCEDURE — 70551 MRI BRAIN STEM W/O DYE: CPT

## 2018-08-13 PROCEDURE — 87040 BLOOD CULTURE FOR BACTERIA: CPT | Performed by: PHYSICIAN ASSISTANT

## 2018-08-13 PROCEDURE — 87077 CULTURE AEROBIC IDENTIFY: CPT | Performed by: INTERNAL MEDICINE

## 2018-08-13 PROCEDURE — 74011000258 HC RX REV CODE- 258: Performed by: INTERNAL MEDICINE

## 2018-08-13 PROCEDURE — 36415 COLL VENOUS BLD VENIPUNCTURE: CPT | Performed by: PHYSICIAN ASSISTANT

## 2018-08-13 PROCEDURE — 99283 EMERGENCY DEPT VISIT LOW MDM: CPT

## 2018-08-13 RX ORDER — HYDROCODONE BITARTRATE AND ACETAMINOPHEN 5; 325 MG/1; MG/1
1 TABLET ORAL
Status: DISCONTINUED | OUTPATIENT
Start: 2018-08-13 | End: 2018-08-21 | Stop reason: HOSPADM

## 2018-08-13 RX ORDER — SODIUM CHLORIDE AND POTASSIUM CHLORIDE .9; .15 G/100ML; G/100ML
SOLUTION INTRAVENOUS CONTINUOUS
Status: DISCONTINUED | OUTPATIENT
Start: 2018-08-13 | End: 2018-08-20

## 2018-08-13 RX ORDER — DIPHENHYDRAMINE HYDROCHLORIDE 50 MG/ML
12.5 INJECTION, SOLUTION INTRAMUSCULAR; INTRAVENOUS
Status: DISCONTINUED | OUTPATIENT
Start: 2018-08-13 | End: 2018-08-21 | Stop reason: HOSPADM

## 2018-08-13 RX ORDER — KETOROLAC TROMETHAMINE 30 MG/ML
15 INJECTION, SOLUTION INTRAMUSCULAR; INTRAVENOUS
Status: DISCONTINUED | OUTPATIENT
Start: 2018-08-13 | End: 2018-08-14

## 2018-08-13 RX ORDER — ENOXAPARIN SODIUM 100 MG/ML
40 INJECTION SUBCUTANEOUS EVERY 24 HOURS
Status: DISCONTINUED | OUTPATIENT
Start: 2018-08-13 | End: 2018-08-21 | Stop reason: HOSPADM

## 2018-08-13 RX ORDER — ONDANSETRON 2 MG/ML
4 INJECTION INTRAMUSCULAR; INTRAVENOUS
Status: DISCONTINUED | OUTPATIENT
Start: 2018-08-13 | End: 2018-08-21 | Stop reason: HOSPADM

## 2018-08-13 RX ORDER — HYDROMORPHONE HYDROCHLORIDE 2 MG/ML
0.5 INJECTION, SOLUTION INTRAMUSCULAR; INTRAVENOUS; SUBCUTANEOUS
Status: DISCONTINUED | OUTPATIENT
Start: 2018-08-13 | End: 2018-08-21 | Stop reason: HOSPADM

## 2018-08-13 RX ORDER — SODIUM CHLORIDE 0.9 % (FLUSH) 0.9 %
5-10 SYRINGE (ML) INJECTION EVERY 8 HOURS
Status: DISCONTINUED | OUTPATIENT
Start: 2018-08-13 | End: 2018-08-21 | Stop reason: HOSPADM

## 2018-08-13 RX ORDER — FENTANYL CITRATE 50 UG/ML
50 INJECTION, SOLUTION INTRAMUSCULAR; INTRAVENOUS
Status: COMPLETED | OUTPATIENT
Start: 2018-08-13 | End: 2018-08-13

## 2018-08-13 RX ORDER — BUTALBITAL, ACETAMINOPHEN AND CAFFEINE 50; 325; 40 MG/1; MG/1; MG/1
1 TABLET ORAL
Status: DISCONTINUED | OUTPATIENT
Start: 2018-08-13 | End: 2018-08-21 | Stop reason: HOSPADM

## 2018-08-13 RX ORDER — SODIUM CHLORIDE 0.9 % (FLUSH) 0.9 %
5-10 SYRINGE (ML) INJECTION AS NEEDED
Status: DISCONTINUED | OUTPATIENT
Start: 2018-08-13 | End: 2018-08-21 | Stop reason: HOSPADM

## 2018-08-13 RX ORDER — LANOLIN ALCOHOL/MO/W.PET/CERES
1000 CREAM (GRAM) TOPICAL DAILY
COMMUNITY
End: 2018-09-07 | Stop reason: ALTCHOICE

## 2018-08-13 RX ORDER — METOCLOPRAMIDE HYDROCHLORIDE 5 MG/ML
10 INJECTION INTRAMUSCULAR; INTRAVENOUS
Status: COMPLETED | OUTPATIENT
Start: 2018-08-13 | End: 2018-08-13

## 2018-08-13 RX ORDER — DOCUSATE SODIUM 100 MG/1
100 CAPSULE, LIQUID FILLED ORAL 2 TIMES DAILY
Status: DISCONTINUED | OUTPATIENT
Start: 2018-08-13 | End: 2018-08-21 | Stop reason: HOSPADM

## 2018-08-13 RX ORDER — DIPHENHYDRAMINE HYDROCHLORIDE 50 MG/ML
25 INJECTION, SOLUTION INTRAMUSCULAR; INTRAVENOUS
Status: COMPLETED | OUTPATIENT
Start: 2018-08-13 | End: 2018-08-13

## 2018-08-13 RX ORDER — NALOXONE HYDROCHLORIDE 0.4 MG/ML
0.4 INJECTION, SOLUTION INTRAMUSCULAR; INTRAVENOUS; SUBCUTANEOUS AS NEEDED
Status: DISCONTINUED | OUTPATIENT
Start: 2018-08-13 | End: 2018-08-21 | Stop reason: HOSPADM

## 2018-08-13 RX ADMIN — ENOXAPARIN SODIUM 40 MG: 40 INJECTION SUBCUTANEOUS at 21:21

## 2018-08-13 RX ADMIN — HYDROCODONE BITARTRATE AND ACETAMINOPHEN 1 TABLET: 5; 325 TABLET ORAL at 22:49

## 2018-08-13 RX ADMIN — SODIUM CHLORIDE AND POTASSIUM CHLORIDE: 9; 1.49 INJECTION, SOLUTION INTRAVENOUS at 16:42

## 2018-08-13 RX ADMIN — DIPHENHYDRAMINE HYDROCHLORIDE 12.5 MG: 50 INJECTION, SOLUTION INTRAMUSCULAR; INTRAVENOUS at 23:31

## 2018-08-13 RX ADMIN — SODIUM CHLORIDE 3.38 G: 900 INJECTION, SOLUTION INTRAVENOUS at 16:44

## 2018-08-13 RX ADMIN — SODIUM CHLORIDE 1000 ML: 900 INJECTION, SOLUTION INTRAVENOUS at 12:40

## 2018-08-13 RX ADMIN — BUTALBITAL, ACETAMINOPHEN, AND CAFFEINE 1 TABLET: 50; 325; 40 TABLET ORAL at 16:51

## 2018-08-13 RX ADMIN — Medication 10 ML: at 21:21

## 2018-08-13 RX ADMIN — FENTANYL CITRATE 50 MCG: 50 INJECTION, SOLUTION INTRAMUSCULAR; INTRAVENOUS at 14:42

## 2018-08-13 RX ADMIN — DIPHENHYDRAMINE HYDROCHLORIDE 25 MG: 50 INJECTION, SOLUTION INTRAMUSCULAR; INTRAVENOUS at 14:42

## 2018-08-13 RX ADMIN — KETOROLAC TROMETHAMINE 15 MG: 30 INJECTION, SOLUTION INTRAMUSCULAR at 18:14

## 2018-08-13 RX ADMIN — HYDROMORPHONE HYDROCHLORIDE 0.5 MG: 2 INJECTION, SOLUTION INTRAMUSCULAR; INTRAVENOUS; SUBCUTANEOUS at 23:56

## 2018-08-13 RX ADMIN — BUTALBITAL, ACETAMINOPHEN, AND CAFFEINE 1 TABLET: 50; 325; 40 TABLET ORAL at 21:21

## 2018-08-13 RX ADMIN — DOCUSATE SODIUM 100 MG: 100 CAPSULE, LIQUID FILLED ORAL at 21:21

## 2018-08-13 RX ADMIN — SODIUM CHLORIDE 3.38 G: 900 INJECTION, SOLUTION INTRAVENOUS at 22:25

## 2018-08-13 RX ADMIN — METOCLOPRAMIDE 10 MG: 5 INJECTION, SOLUTION INTRAMUSCULAR; INTRAVENOUS at 12:40

## 2018-08-13 RX ADMIN — Medication 10 ML: at 18:17

## 2018-08-13 NOTE — PROGRESS NOTES
[facility]: MED RECONCILIATION    Comments/Recommendations:     Medications added:     · Vitamin B12     Medications removed:    · none    Medications adjusted:    · none    Information obtained from: patient, Rx query    Significant PMH/Disease States: No past medical history on file. Chief Complaint for this Admission:   Chief Complaint   Patient presents with    Headache     Allergies: Morphine    Prior to Admission Medications:     Medication Documentation Review Audit       Reviewed by PATTI MenardD (Pharmacist) on 08/13/18 at 1642         Medication Sig Documenting Provider Last Dose Status Taking? butalbital-acetaminophen-caff (FIORICET) -40 mg per capsule Take 1 Cap by mouth every four (4) hours as needed for Pain. Ramila Horacio, DO 8/12/2018 pm Active Yes    cephALEXin (KEFLEX) 500 mg capsule Take 1 Cap by mouth three (3) times daily for 7 days. Ramila Horacio, DO 8/12/2018 pm Active Yes    cyanocobalamin (VITAMIN B-12) 1,000 mcg tablet Take 1,000 mcg by mouth daily. Historical Provider 8/6/2018 Unknown time Active Yes    fexofenadine-pseudoephedrine (ALLEGRA-D 24 HOUR) 180-240 mg per tablet Take 1 Tab by mouth daily. Christian Figueroa MD 8/6/2018 Unknown time Active Yes    metoclopramide HCl (REGLAN) 5 mg tablet Take 1 Tab by mouth every six (6) hours as needed for Nausea for up to 10 days. Amy Jackson MD  Active Yes    ondansetron (ZOFRAN ODT) 4 mg disintegrating tablet Take 1 Tab by mouth every eight (8) hours as needed for Nausea. Ramila Leonard, DO  Active Yes                  Thank you for the consult,  Qasim WAKEFIELD, [facility]

## 2018-08-13 NOTE — PROGRESS NOTES
[facility] Dosing Services: Antimicrobial Stewardship Progress Note    Consult for antibiotic dosing of Zosyn by Dr. Arpita Aj  Pharmacist reviewed antibiotic appropriateness for 76year old , female  for indication of bacteremia   Day of Therapy 1    Plan:    Non-Kinetic Antimicrobial Dosing:   Recommendation: Zosyn 3.375 gm IV x 1 over 30 minutes (given in ED) followed by Zosyn 3.375 gm IV Q8H for CrCl ~74 mL/min    Other Antimicrobial  (not dosed by pharmacist)   Keflex PTA   Cultures     8/13 Urine - (pending)  8/13 Blood - (pending)    Previous admission:  8/7 Blood - alpha strep 1/2 bottles (FINAL)   Serum Creatinine     Lab Results   Component Value Date/Time    Creatinine 0.89 08/13/2018 03:44 PM       Creatinine Clearance Estimated Creatinine Clearance: 73.7 mL/min (based on Cr of 0.89). Temp   (!) 100.5 °F (38.1 °C)      WBC   Lab Results   Component Value Date/Time    WBC 19.1 (H) 08/13/2018 12:42 PM       H/H   Lab Results   Component Value Date/Time    HGB 14.8 08/13/2018 12:42 PM        Platelets   Lab Results   Component Value Date/Time    PLATELET 086 53/81/2628 12:42 PM        Thank you for the consult,  Mega Tamayo

## 2018-08-13 NOTE — H&P
[facility] [address], [place], [address] [facility]  [phone]    Admission History and Physical      NAME:  Jose Bowling   :   1950   MRN:  [id number]     PCP:  None     Date/Time:  2018         Subjective:     CHIEF COMPLAINT: intractable headache     HISTORY OF PRESENT ILLNESS:     The patient is a 77 yo otherwise healthy, presented w/ intractable headache, strep bacteremia. The patient went to the ED 1 week ago with right sided headache . She was also found to have a UTI and was given keflex. Blood Cx drawn at that time grew 1/2 bottles alpha strep. After her discharge, the patient continued to have severe right sided headache, associated with photophobia. She also had intermittent fevers. Denied chest pain, SOB, nausea, vomiting, diarrhea. In the ED, WBC was 19.1. Head CT neg. Allergies   Allergen Reactions    Morphine Rash       Prior to Admission medications    Medication Sig Start Date End Date Taking? Authorizing Provider   metoclopramide HCl (REGLAN) 5 mg tablet Take 1 Tab by mouth every six (6) hours as needed for Nausea for up to 10 days. 8/10/18 8/20/18  Amy Coleman MD   butalbital-acetaminophen-caff (FIORICET) -40 mg per capsule Take 1 Cap by mouth every four (4) hours as needed for Pain. 18   Ramila Horacio, DO   cephALEXin (KEFLEX) 500 mg capsule Take 1 Cap by mouth three (3) times daily for 7 days. 8/8/18 8/15/18  Ramila Horacio, DO   ondansetron (ZOFRAN ODT) 4 mg disintegrating tablet Take 1 Tab by mouth every eight (8) hours as needed for Nausea. 18   Ramila Horacio, DO   fexofenadine-pseudoephedrine (ALLEGRA-D 24 HOUR) 180-240 mg per tablet Take 1 Tab by mouth daily. Christian Figueroa MD       No past medical history on file.      Past Surgical History:   Procedure Laterality Date    HX GYN      Hysterectomy       Social History   Substance Use Topics    Smoking status: Never Smoker    Smokeless tobacco: Never Used    Alcohol use No        Family History   Problem Relation Age of Onset    Diabetes Mother     Heart Attack Brother         Review of Systems:  (bold if positive, if negative)    Gen:  Eyes:  ENT:  CVS:  Pulm:  GI:    :    MS:  Skin:  Psych:  Endo:    Hem:  Renal:    Neuro:  , headache,        Objective:      VITALS:    Vital signs reviewed; most recent are:    Visit Vitals    /72 (BP 1 Location: Left arm, BP Patient Position: At rest)    Pulse 69    Temp 98 °F (36.7 °C)    Resp 14    Ht 5' 8\" (1.727 m)    Wt 97.1 kg (214 lb)    SpO2 100%    BMI 32.54 kg/m2     SpO2 Readings from Last 6 Encounters:   08/13/18 100%   08/10/18 97%   08/08/18 94%        No intake or output data in the 24 hours ending 08/13/18 1531     Exam:     Physical Exam:    Gen:  Well-developed, well-nourished, obese, mild distress  HEENT:  Pink conjunctivae, PERRL, hearing intact to voice, moist mucous membranes  Neck:  Supple, without masses, thyroid non-tender  Resp:  No accessory muscle use, clear breath sounds without wheezes rales or rhonchi  Card:  No murmurs, normal S1, S2 without thrills, bruits or peripheral edema  Abd:  Soft, non-tender, non-distended, normoactive bowel sounds are present  Lymph:  No cervical adenopathy  Musc:  No cyanosis or clubbing  Skin:  No rashes   Neuro:  Cranial nerves 3-12 are grossly intact,  strength is 5/5 bilaterally, dorsi / plantarflexion strength is 5/5 bilaterally, follows commands appropriately  Psych:  Alert with good insight. Oriented to person, place, and time    Labs:    Recent Labs      08/13/18   1242   WBC  19.1*   HGB  14.8   HCT  43.4   PLT  365     No results for input(s): NA, K, CL, CO2, GLU, BUN, CREA, CA, MG, PHOS, ALB, TBIL, TBILI, SGOT, ALT in the last 72 hours. No results found for: GLUCPOC  No results for input(s): PH, PCO2, PO2, HCO3, FIO2 in the last 72 hours. No results for input(s): INR in the last 72 hours.     No lab exists for component: INREXT    Radiology and EKG reviewed:   Head CT neg    **Old Records reviewed in [facility]**       Assessment/Plan:       Principal Problem:    75 yo otherwise healthy, presented w/ intractable headache, strep bacteremia    1) Leukocytosis/Bacteremia: not septic. Blood Cx on 08/07 with 1/2 alpha-strep. Unclear source. Patient has been on Keflex. Will repeat blood Cx. Check Echo to r/o endocarditis. Empirically start on IV Zosyn. Consult ID    2) Intractable headache: might be migraine, but no hx of migraine in the past.  Head CT was neg. Will obtain head MRI. Start IV toradol prn, IV dilaudid prn, IV antiemetics. Consult Neuro    3) UTI (urinary tract infection): diagnosed 7 days ago, but urine Cx neg.   Will monitor    Code: Full    Risk of deterioration: high      Total time spent with patient: 60 min                  Care Plan discussed with: Patient, nursing    Discussed:  Care Plan    Prophylaxis:  Lovenox    Probable Disposition:  Home w/Family           ___________________________________________________    Attending Physician: Uzair De La Cruz MD

## 2018-08-13 NOTE — IP AVS SNAPSHOT
[facility] 
 
 
 [address] [facility] 100[facility] 
[phone] Patient: Ashley Lerner MRN: [id number] SOPHY:[id number] About your hospitalization You were admitted on:  August 13, 2018 You last received care in the:  [facility]  MED SURG 2 You were discharged on:  August 21, 2018 Why you were hospitalized Your primary diagnosis was: Bacteremia Your diagnoses also included:  Leukocytosis (Leucocytosis), Intractable Headache, Obesity Follow-up Information Follow up With Details Comments Contact Info Reagan Arauz MD On 8/30/2018 Follow-up on August 30 @ 9:00 a.m. Please arrive 30 minutes prior to appointment. [place] [address] [address] 
[phone] Jojo Vernon MD On 9/13/2018 3 pm  [address] [facility] [address] 100[address] [facility] 
[phone] None   None (395) Patient stated that they have no PCP [facility]  Transitions of Care/ IV Infusion  [facility] [facility], [address] 
[phone] Pete Perez MD On 9/7/2018 New PCP appointment on Friday 9/7 @ 10:15 a.m. [place] [address] [facility] [address] 
[phone] Your Scheduled Appointments Thursday August 30, 2018  9:00 AM EDT New Patient with Reagan Arauz MD  
[address] [facility] ([address] [place] [address]) [place] [address] [address] [phone] Friday September 07, 2018 10:15 AM EDT TRANSITIONAL CARE MANAGEMENT with Pete Perez MD  
[facility] [address]) [address]  
[phone] Thursday September 13, 2018  3:00 PM EDT  
ESTABLISHED PATIENT with Jojo Vernon MD  
[facility] ([address] [place] [address]) 354 [facility] 600 1007 [facility]  
[phone] Discharge Orders None A check tiesha indicates which time of day the medication should be taken. My Medications START taking these medications Instructions Each Dose to Equal  
 Morning Noon Evening Bedtime  
 apixaban 5 mg tablet Commonly known as:  Sandra Conrad Take 1 Tab by mouth two (2) times a day. 5 mg  
    
  
   
   
  
   
  
 cefTRIAXone 2 gram, ADDaptor 1 Device IVPB Your last dose was:  2018  5:14 PM  
   
 Per Dr. Dallas Reese CONTINUE taking these medications Instructions Each Dose to Equal  
 Morning Noon Evening Bedtime ALLEGRA-D 24 HOUR 180-240 mg per tablet Generic drug:  fexofenadine-pseudoephedrine Take 1 Tab by mouth daily. 1 Tab  
    
  
   
   
   
  
 butalbital-acetaminophen-caff -40 mg per capsule Commonly known as:  [employer] Take 1 Cap by mouth every four (4) hours as needed for Pain. 1 Cap  
    
   
   
   
  
 ondansetron 4 mg disintegrating tablet Commonly known as:  ZOFRAN ODT Take 1 Tab by mouth every eight (8) hours as needed for Nausea. 4 mg VITAMIN B-12 1,000 mcg tablet Generic drug:  cyanocobalamin Take 1,000 mcg by mouth daily. 1000 mcg STOP taking these medications   
 cephALEXin 500 mg capsule Commonly known as:  KEFLEX  
   
  
 metoclopramide HCl 5 mg tablet Commonly known as:  REGLAN Where to Get Your Medications Information on where to get these meds will be given to you by the nurse or doctor. ! Ask your nurse or doctor about these medications  
  apixaban 5 mg tablet Discharge Instructions HOSPITALIST DISCHARGE INSTRUCTIONS 
NAME: Marcial Ashford :  1950 MRN:  [id number] Date/Time:  2018 11:47 AM 
 
ADMIT DATE: 2018 DISCHARGE DATE: 8/21/2018 DISCHARGE DIAGNOSIS: 
Bacteremia MEDICATIONS: 
· It is important that you take the medication exactly as they are prescribed. · Keep your medication in the bottles provided by the pharmacist and keep a list of the medication names, dosages, and times to be taken in your wallet. · Do not take other medications without consulting your doctor. Pain Management: per above medications What to do at [facility] Recommended diet:  Cardiac Diet Recommended activity: Activity as tolerated If you experience any of the following symptoms then please call your primary care physician or return to the emergency room if you cannot get hold of your doctor: 
Fever, chills, nausea, vomiting, diarrhea, change in mentation, falling, bleeding, shortness of breath Follow Up: Follow-up Information Follow up With Details Comments Contact Info Bradford Smyth NP In 2 weeks  [address] [facility] 99 [address] 
[phone] Daysi Boggs MD On 9/13/2018 3 pm  [facility] [address] [place] [address] 
[phone] Information obtained by : 
I understand that if any problems occur once I am at home I am to contact my physician. I understand and acknowledge receipt of the instructions indicated above. Physician's or R.N.'s Signature                                                                  Date/Time Patient or Representative Signature                                                          Date/Time Atrial Fibrillation: Care Instructions Your Care Instructions Atrial fibrillation is an irregular and often fast heartbeat.  Treating this condition is important for several reasons. It can cause blood clots, which can travel from your heart to your brain and cause a stroke. If you have a fast heartbeat, you may feel lightheaded, dizzy, and weak. An irregular heartbeat can also increase your risk for heart failure. Atrial fibrillation is often the result of another heart condition, such as high blood pressure or coronary artery disease. Making changes to improve your heart condition will help you stay healthy and active. Follow-up care is a key part of your treatment and safety. Be sure to make and go to all appointments, and call your doctor if you are having problems. It's also a good idea to know your test results and keep a list of the medicines you take. How can you care for yourself at home? Medicines 
  · Take your medicines exactly as prescribed. Call your doctor if you think you are having a problem with your medicine. You will get more details on the specific medicines your doctor prescribes.  
  · If your doctor has given you a blood thinner to prevent a stroke, be sure you get instructions about how to take your medicine safely. Blood thinners can cause serious bleeding problems.  
  · Do not take any vitamins, over-the-counter drugs, or herbal products without talking to your doctor first.  
 Lifestyle changes 
  · Do not smoke. Smoking can increase your chance of a stroke and heart attack. If you need help quitting, talk to your doctor about stop-smoking programs and medicines. These can increase your chances of quitting for good.  
  · Eat a heart-healthy diet.  
  · Stay at a healthy weight. Lose weight if you need to.  
  · Limit alcohol to 2 drinks a day for men and 1 drink a day for women. Too much alcohol can cause health problems.  
  · Avoid colds and flu. Get a pneumococcal vaccine shot. If you have had one before, ask your doctor whether you need another dose.  Get a flu shot every year. If you must be around people with colds or flu, wash your hands often. Activity 
  · If your doctor recommends it, get more exercise. Walking is a good choice. Bit by bit, increase the amount you walk every day. Try for at least 30 minutes on most days of the week. You also may want to swim, bike, or do other activities. Your doctor may suggest that you join a cardiac rehabilitation program so that you can have help increasing your physical activity safely.  
  · Start light exercise if your doctor says it is okay. Even a small amount will help you get stronger, have more energy, and manage stress. Walking is an easy way to get exercise. Start out by walking a little more than you did in the hospital. Gradually increase the amount you walk.  
  · When you exercise, watch for signs that your heart is working too hard. You are pushing too hard if you cannot talk while you are exercising. If you become short of breath or dizzy or have chest pain, sit down and rest immediately.  
  · Check your pulse regularly. Place two fingers on the artery at the palm side of your wrist, in line with your thumb. If your heartbeat seems uneven or fast, talk to your doctor. When should you call for help? Call 911 anytime you think you may need emergency care. For example, call if: 
  · You have symptoms of a heart attack. These may include: ¨ Chest pain or pressure, or a strange feeling in the chest. 
¨ Sweating. ¨ Shortness of breath. ¨ Nausea or vomiting. ¨ Pain, pressure, or a strange feeling in the back, neck, jaw, or upper belly or in one or both shoulders or arms. ¨ Lightheadedness or sudden weakness. ¨ A fast or irregular heartbeat. After you call 911, the  may tell you to chew 1 adult-strength or 2 to 4 low-dose aspirin. Wait for an ambulance. Do not try to drive yourself.  
  · You have symptoms of a stroke. These may include: ¨ Sudden numbness, tingling, weakness, or loss of movement in your face, arm, or leg, especially on only one side of your body. ¨ Sudden vision changes. ¨ Sudden trouble speaking. ¨ Sudden confusion or trouble understanding simple statements. ¨ Sudden problems with walking or balance. ¨ A sudden, severe headache that is different from past headaches.  
  · You passed out (lost consciousness).  
 Call your doctor now or seek immediate medical care if: 
  · You have new or increased shortness of breath.  
  · You feel dizzy or lightheaded, or you feel like you may faint.  
  · Your heart rate becomes irregular.  
  · You can feel your heart flutter in your chest or skip heartbeats. Tell your doctor if these symptoms are new or worse.  
 Watch closely for changes in your health, and be sure to contact your doctor if you have any problems. Where can you learn more? Go to http://hao-tsering.info/. Enter U020 in the search box to learn more about \"Atrial Fibrillation: Care Instructions. \" Current as of: December 6, 2017 Content Version: 11.7 © 3339-0997 trend.ly. Care instructions adapted under license by "[employer] (which disclaims liability or warranty for this information). If you have questions about a medical condition or this instruction, always ask your healthcare professional. [address] any warranty or liability for your use of this information. Cinsay Announcement We are excited to announce that we are making your provider's discharge notes available to you in Cinsay. You will see these notes when they are completed and signed by the physician that discharged you from your recent hospital stay.   If you have any questions or concerns about any information you see in Cinsay, please call the Health Information Department where you were seen or reach out to your Primary Care Provider for more information about your plan of care. Introducing [facility]! [employer] introduces Ju[employer] In [employer] City[employer] patient portal. Now you can access parts of your medical record, email your doctor's office, and request medication refills online. 1. In your internet browser, go to [email or web address] 2. Click on the First Time User? Click Here link in the Sign In box. You will see the New Member Sign Up page. 3. Enter your IntenseDebate Access Code exactly as it appears below. You will not need to use this code after youve completed the sign-up process. If you do not sign up before the expiration date, you must request a new code. · IntenseDebate Access Code: [email or web address] Expires: 11/5/2018 10:20 PM 
 
4. Enter the last four digits of your Social Security Number (xxxx) and Date of Birth (mm/dd/yyyy) as indicated and click Submit. You will be taken to the next sign-up page. 5. Create a IntenseDebate ID. This will be your IntenseDebate login ID and cannot be changed, so think of one that is secure and easy to remember. 6. Create a IntenseDebate password. You can change your password at any time. 7. Enter your Password Reset Question and Answer. This can be used at a later time if you forget your password. 8. Enter your e-mail address. You will receive e-mail notification when new information is available in [address] 9. Click Sign Up. You can now view and download portions of your medical record. 10. Click the Download Summary menu link to download a portable copy of your medical information. If you have questions, please visit the Frequently Asked Questions section of the IntenseDebate website. Remember, IntenseDebate is NOT to be used for urgent needs. For medical emergencies, dial 911. Now available from your iPhone and Android! Introducing Srini Reich As a [employer] patient, I wanted to make you aware of our electronic visit tool called Srini Reich. [facility] 24/7 allows you to connect within minutes with a medical provider 24 hours a day, seven days a week via a mobile device or tablet or logging into a secure website from your computer. You can access [employer] from anywhere in the [place]. A virtual visit might be right for you when you have a simple condition and feel like you just dont want to get out of bed, or cant get away from work for an appointment, when your regular Lowell Cedeno provider is not available (evenings, weekends or holidays), or when youre out of town and need minor care. Electronic visits cost only $49 and if the Lowell Cedeno 24/7 provider determines a prescription is needed to treat your condition, one can be electronically transmitted to a nearby pharmacy*. Please take a moment to enroll today if you have not already done so. The enrollment process is free and takes just a few minutes. To enroll, please download the Peter Single 24/7 althea to your tablet or phone, or visit [email or web address] to enroll on your computer. And, as an [address] [place] [address] patient with a [employer] account, the results of your visits will be scanned into your electronic medical record and your primary care provider will be able to view the scanned results. We urge you to continue to see your regular Lowell Cedeno provider for your ongoing medical care. And while your primary care provider may not be the one available when you seek a Srini Winstonfin virtual visit, the peace of mind you get from getting a real diagnosis real time can be priceless. For more information on [employer]uleSoft, view our Frequently Asked Questions (FAQs) at [email or web address]. Sincerely, 
 
Francisco Deal MD 
Chief Medical Officer Hair Acevedo *:  certain medications cannot be prescribed via [employer]Soft Unresulted Labs-Please follow up with your PCP about these lab tests Order Current Status LYME AB, IGG & IGM BY WB, CSF In process XR US GUIDED VASCULAR ACCESS In process AEROBIC ID BY MALDI Preliminary result CULTURE, BLOOD Preliminary result Providers Seen During Your Hospitalization Provider Specialty Primary office phone Tiffanie Vela. Calos Alvarez, [address] [facility] Emergency Medicine [phone] Mariela Galvez MD Internal Medicine [phone] Rissa Herman MD Hospitalist [phone] [facility] Internal Medicine [phone] Reji Phelps MD Internal Medicine [phone] Your Primary Care Physician (PCP) Primary Care Physician Office Phone Office Fax NONE ** None ** ** None ** You are allergic to the following Allergen Reactions Morphine Rash Recent Documentation Height Weight BMI OB Status Smoking Status 1.727 m 97.1 kg 32.54 kg/m2 Hysterectomy Never Smoker Emergency Contacts Name Discharge Info Relation Home Work Mobile [employer] 8 CAREGIVER [3] Child [2] [phone] Patient Belongings The following personal items are in your possession at time of discharge: 
  Dental Appliances: None  Visual Aid: Glasses, With patient      Home Medications: None   Jewelry: Necklace  Clothing: Pants, Shirt, Undergarments, With patient    Other Valuables: Cell Phone, Jose Hardy, At bedside Please provide this summary of care documentation to your next provider. Signatures-by signing, you are acknowledging that this After Visit Summary has been reviewed with you and you have received a copy. Patient Signature:  ____________________________________________________________ Date:  ____________________________________________________________  
  
Abhishek Sport Provider Signature:  ____________________________________________________________ Date:  ____________________________________________________________

## 2018-08-13 NOTE — ED NOTES
US guided 20 g IV placed in the upper right arm, one set of paired blood cultures and lactic acid specimen sent to lab. Very difficult stick. Dr. Afshin Gomes notified. Cleared to administer antibiotics.

## 2018-08-13 NOTE — ED NOTES
The patient complained of discomfort in the right upper arm. IV lock removed. Fluids resumed in 20 IV in the right AC.

## 2018-08-13 NOTE — ED PROVIDER NOTES
HPI Comments: 76 y.o. female with no known significant past medical history who presents from home via EMS with chief complaint of headaches. Pt reports she has had continuous headaches for one week and has been seen in the ED two times prior to today for the same. Pt reports her headache was generalized on her last visit to the ED. However, pt states her headache is right sided and currently describes it as a severe aching headache. In the ED, pt rates her headache a 10/10 in severity. Pt reports she was prescribed Fioricet, Keflex, and Reglan on her last visit to the ED on 8/10/18. Pt admits she did not fill out her Reglan prescription because she thought it was only supposed to help with nausea. Pt states she has been taking Fioricet, Keflex, and Motrin with minimal relief. Upon chart review, pt's last visit on 8/10/18 included a negative CT of the head, elevated white blood count, positive urinalysis with urinary symptoms, and a positive blood culture thought to be a possible contaminant. Pt reports she reports to the ED today for progression of her headache despite taking her medications. Pt denies being on any anticoagulants. Pt denies any abdominal pain or urinary symptoms. There are no other acute medical concerns at this time. Social hx: Tobacco use: none, Alcohol use: none    Note written by Satya Beaulieu, as dictated by Demarcus Guerrero. JOSE ELIAS Seals 11:50 AM      The history is provided by the patient. No  was used. No past medical history on file. Past Surgical History:   Procedure Laterality Date    HX GYN      Hysterectomy         Family History:   Problem Relation Age of Onset    Diabetes Mother     Heart Attack Brother        Social History     Social History    Marital status: SINGLE     Spouse name: N/A    Number of children: N/A    Years of education: N/A     Occupational History    Not on file.      Social History Main Topics    Smoking status: Never Smoker    Smokeless tobacco: Never Used    Alcohol use No    Drug use: No    Sexual activity: Not on file     Other Topics Concern    Not on file     Social History Narrative         ALLERGIES: Morphine    Review of Systems   Constitutional: Negative for chills and fever. HENT: Negative for ear pain and sore throat. Eyes: Negative for pain. Respiratory: Negative for chest tightness and shortness of breath. Cardiovascular: Negative for chest pain and leg swelling. Gastrointestinal: Negative for abdominal pain, nausea and vomiting. Genitourinary: Negative for dysuria and flank pain. Musculoskeletal: Negative for back pain. Skin: Negative for rash. Neurological: Positive for headaches. All other systems reviewed and are negative. Patient Vitals for the past 12 hrs:   Temp Pulse Resp BP SpO2   08/13/18 1655 (!) 100.5 °F (38.1 °C) 84 15 163/73 -   08/13/18 1645 - 85 30 163/73 97 %   08/13/18 1116 98 °F (36.7 °C) 69 14 146/72 100 %            Physical Exam   Constitutional: She is oriented to person, place, and time. She appears well-developed and well-nourished. No distress. HENT:   Head: Normocephalic and atraumatic. Right Ear: External ear normal.   Left Ear: External ear normal.   Nose: Nose normal.   Mouth/Throat: Oropharynx is clear and moist. No oropharyngeal exudate. Eyes: EOM are normal. Pupils are equal, round, and reactive to light. Neck: Neck supple. No JVD present. No tracheal deviation present. No nuchal rigidity or meningismus   Cardiovascular: Normal rate, regular rhythm, normal heart sounds and intact distal pulses. Exam reveals no gallop and no friction rub. No murmur heard. Pulmonary/Chest: Effort normal and breath sounds normal. No stridor. No respiratory distress. She has no wheezes. She has no rales. She exhibits no tenderness. Abdominal: Soft. Bowel sounds are normal. She exhibits no distension and no mass. There is no tenderness.  There is no rebound and no guarding. Musculoskeletal: Normal range of motion. She exhibits no edema, tenderness or deformity. Lymphadenopathy:     She has no cervical adenopathy. Neurological: She is alert and oriented to person, place, and time. No cranial nerve deficit. Coordination normal.   Skin: No rash noted. No erythema. No pallor. Psychiatric: She has a normal mood and affect. Her behavior is normal.   Nursing note and vitals reviewed. MDM  Number of Diagnoses or Management Options  Acute intractable headache, unspecified headache type:   Positive blood culture:   Urinary tract infection without hematuria, site unspecified:      Amount and/or Complexity of Data Reviewed  Clinical lab tests: ordered and reviewed  Tests in the radiology section of CPT®: reviewed  Tests in the medicine section of CPT®: reviewed and ordered  Obtain history from someone other than the patient: yes  Review and summarize past medical records: yes  Independent visualization of images, tracings, or specimens: yes    Patient Progress  Patient progress: stable        ED Course       Procedures    12:04 PM  Discussed pt, sx, hx and current findings with Yuki Houser MD. He is in agreement with plan and will see pt   Alee Brown. DIMITRY Seals    2:00 PM   still having pain, will give more meds. Wbc elevates. Will admit and add labs  Alee Brown. DIMITRY Seals    3:13 PM  Alee Brown. DIMITRY Seals spoke with Dr. Jurgen Daly, Consult for Hospitalist. Discussed available diagnostic tests and clinical findings. He is in agreement with care plans as outlined.  He will admit pt  JOSE ELIAS Vargas    LABS COMPLETED AND REVIEWED:  Recent Results (from the past 12 hour(s))   CBC WITH AUTOMATED DIFF    Collection Time: 08/13/18 12:42 PM   Result Value Ref Range    WBC 19.1 (H) 3.6 - 11.0 K/uL    RBC 4.78 3.80 - 5.20 M/uL    HGB 14.8 11.5 - 16.0 g/dL    HCT 43.4 35.0 - 47.0 %    MCV 90.8 80.0 - 99.0 FL    MCH 31.0 26.0 - 34.0 PG    MCHC 34.1 30.0 - 36.5 g/dL    RDW 12.7 11.5 - 14.5 %    PLATELET 827 455 - 386 K/uL    MPV 11.1 8.9 - 12.9 FL    NRBC 0.0 0  WBC    ABSOLUTE NRBC 0.00 0.00 - 0.01 K/uL    NEUTROPHILS 81 (H) 32 - 75 %    LYMPHOCYTES 12 12 - 49 %    MONOCYTES 6 5 - 13 %    EOSINOPHILS 0 0 - 7 %    BASOPHILS 0 0 - 1 %    IMMATURE GRANULOCYTES 1 (H) 0.0 - 0.5 %    ABS. NEUTROPHILS 15.6 (H) 1.8 - 8.0 K/UL    ABS. LYMPHOCYTES 2.2 0.8 - 3.5 K/UL    ABS. MONOCYTES 1.1 (H) 0.0 - 1.0 K/UL    ABS. EOSINOPHILS 0.0 0.0 - 0.4 K/UL    ABS. BASOPHILS 0.0 0.0 - 0.1 K/UL    ABS. IMM. GRANS. 0.2 (H) 0.00 - 0.04 K/UL    DF AUTOMATED     URINALYSIS W/MICROSCOPIC    Collection Time: 08/13/18  2:47 PM   Result Value Ref Range    Color YELLOW/STRAW      Appearance CLEAR CLEAR      Specific gravity 1.025 1.003 - 1.030      pH (UA) 6.0 5.0 - 8.0      Protein NEGATIVE  NEG mg/dL    Glucose NEGATIVE  NEG mg/dL    Ketone 15 (A) NEG mg/dL    Bilirubin NEGATIVE  NEG      Blood NEGATIVE  NEG      Urobilinogen 1.0 0.2 - 1.0 EU/dL    Nitrites NEGATIVE  NEG      Leukocyte Esterase NEGATIVE  NEG      WBC 0-4 0 - 4 /hpf    RBC 0-5 0 - 5 /hpf    Epithelial cells MODERATE (A) FEW /lpf    Bacteria NEGATIVE  NEG /hpf    Hyaline cast 2-5 0 - 5 /lpf   URINE CULTURE HOLD SAMPLE    Collection Time: 08/13/18  2:47 PM   Result Value Ref Range    Urine culture hold        URINE ON HOLD IN MICROBIOLOGY DEPT FOR 3 DAYS. IF UNPRESERVED URINE IS SUBMITTED, IT CANNOT BE USED FOR ADDITIONAL TESTING AFTER 24 HRS, RECOLLECTION WILL BE REQUIRED. LACTIC ACID    Collection Time: 08/13/18  3:44 PM   Result Value Ref Range    Lactic acid 1.1 0.4 - 2.0 MMOL/L       IMAGING COMPLETED AND REVIEWED:  The following have been ordered and reviewed:    No results found.     MEDICATIONS GIVEN:  Medications   butalbital-acetaminophen-caffeine (FIORICET, ESGIC) -40 mg per tablet 1 Tab (1 Tab Oral Given 8/13/18 1651)   0.9% sodium chloride with KCl 20 mEq/L infusion ( IntraVENous New Bag 8/13/18 3412)   sodium chloride (NS) flush 5-10 mL (not administered)   sodium chloride (NS) flush 5-10 mL (not administered)   HYDROcodone-acetaminophen (NORCO) 5-325 mg per tablet 1 Tab (not administered)   HYDROmorphone (PF) (DILAUDID) injection 0.5 mg (not administered)   naloxone (NARCAN) injection 0.4 mg (not administered)   diphenhydrAMINE (BENADRYL) injection 12.5 mg (not administered)   ondansetron (ZOFRAN) injection 4 mg (not administered)   docusate sodium (COLACE) capsule 100 mg (not administered)   enoxaparin (LOVENOX) injection 40 mg (not administered)   ketorolac (TORADOL) injection 15 mg (not administered)   piperacillin-tazobactam (ZOSYN) 3.375 g in 0.9% sodium chloride (MBP/ADV) 100 mL ADV (3.375 g IntraVENous Given 8/13/18 1644)   sodium chloride 0.9 % bolus infusion 1,000 mL (1,000 mL IntraVENous New Bag 8/13/18 1240)   metoclopramide HCl (REGLAN) injection 10 mg (10 mg IntraVENous Given 8/13/18 1240)   diphenhydrAMINE (BENADRYL) injection 25 mg (25 mg IntraVENous Given 8/13/18 1442)   fentaNYL citrate (PF) injection 50 mcg (50 mcg IntraVENous Given 8/13/18 1442)         CLINICAL IMPRESSION:  1. Acute intractable headache, unspecified headache type    2. Urinary tract infection without hematuria, site unspecified    3. Positive blood culture          Plan  1. Admission per Hospitalist      3:14 PM  The patient is being admitted to the hospital.  The results of their tests and reasons for their admission have been discussed with them and/or available family. The patient/family has conveyed agreement and understanding for the need to be admitted and for their admission diagnosis. Consultation has been made with the inpatient physician specialist for hospitalization.

## 2018-08-13 NOTE — ED NOTES
Verbal report given to CORIN Bergeron(name) on [employer] Friend being transferred to [address] [place] [address] for routine progression of care    Report consisted of patient's Situation, Background, Assessment and Recommendations (SBAR)    Information from the following report(s)  SBAR, ED Summary, Procedure Summary, Intake/Output, MAR, Recent Results, Med Rec Status and Cardiac Rhythm NSR was reviewed with the receiving nurse. Opportunity for questions and clarification was provided. Patient transported with:  Tech    Last Filed Values:  Temp: (!) 100.5 °F (38.1 °C) (08/13/18 1655)  Pulse (Heart Rate): 84 (08/13/18 1800)  Resp Rate: 24 (08/13/18 1800)  O2 Sat (%): 93 % (08/13/18 1800)  BP: 157/73 (08/13/18 1800)  MAP (Monitor): 93 (08/13/18 1800)  MAP (Calculated): 103 (08/13/18 1655)  Level of Consciousness: Alert (08/13/18 1834)      Lab Results   Component Value Date/Time    WBC 19.1 (H) 08/13/2018 12:42 PM    Lactic acid 1.1 08/13/2018 03:44 PM       Repeat LA:  Time Due NA    Blood Cultures Drawn:  yes    Fluid Resuscitation:  Total needed NA, Status other 1000 ml bolus completed, continuous infusion running. All Antibiotics Started:  yes, Dose Due See MAR    VS x 2 post-fluid resuscitation:   yes    Vasopressor Infusion:  no NA    Provider Reassessment needed and notified:  yes , Due As needed. Additional Interventions/Comments:  None.

## 2018-08-13 NOTE — PROGRESS NOTES
8/13/2018  5:11 PM  Case management note    Met with patient to discuss discharge planning. Patient lives with her mother in 2 story home. There are 3 steps to enter and 14 within. Patient has never used Home health and has no medical equipment. Patient does drive, works and performs all ADL's independently. Patient uses [employer]. She does not have a PCP, provided list of [place] [address]. Reason for Admission:   sepsis                   RRAT Score:          5           Plan for utilizing home health:      Unable to determine at this time                    Likelihood of Readmission:  Low/green                         Transition of Care Plan:          Unable to determine at this time    Care Management Interventions  PCP Verified by CM:  Yes  Mode of Transport at Discharge: Self  Transition of Care Consult (CM Consult): Discharge Planning  Current Support Network: [address] Follow Up Transport: Family  Plan discussed with Pt/Family/Caregiver: Yes  Discharge Location  Discharge Placement: Unable to determine at this time  Aurelia Leyden, [address]

## 2018-08-13 NOTE — ED TRIAGE NOTES
Pt c/o HA x 1 week h/o same. Pt took advil, reglan, toradol, and decadron without relief. +light sensitivity.

## 2018-08-14 LAB
ALBUMIN SERPL-MCNC: 2.5 G/DL (ref 3.5–5)
ALBUMIN/GLOB SERPL: 0.6 {RATIO} (ref 1.1–2.2)
ALP SERPL-CCNC: 95 U/L (ref 45–117)
ALT SERPL-CCNC: 29 U/L (ref 12–78)
ANION GAP SERPL CALC-SCNC: 5 MMOL/L (ref 5–15)
APPEARANCE CSF: CLEAR
APPEARANCE CSF: CLEAR
AST SERPL-CCNC: 20 U/L (ref 15–37)
BILIRUB DIRECT SERPL-MCNC: 0.2 MG/DL (ref 0–0.2)
BILIRUB SERPL-MCNC: 0.6 MG/DL (ref 0.2–1)
BUN SERPL-MCNC: 10 MG/DL (ref 6–20)
BUN/CREAT SERPL: 12 (ref 12–20)
CALCIUM SERPL-MCNC: 8.3 MG/DL (ref 8.5–10.1)
CHLORIDE SERPL-SCNC: 103 MMOL/L (ref 97–108)
CO2 SERPL-SCNC: 27 MMOL/L (ref 21–32)
COLOR CSF: COLORLESS
COLOR CSF: COLORLESS
CREAT SERPL-MCNC: 0.82 MG/DL (ref 0.55–1.02)
CRYPTOCOCCUS NEOFORMANS/GATTII, CRNEOG: NOT DETECTED
CYTOMEGALOVIRUS, CYMEG: NOT DETECTED
ENTEROVIRUS, ENTVIR: NOT DETECTED
ERYTHROCYTE [DISTWIDTH] IN BLOOD BY AUTOMATED COUNT: 12.5 % (ref 11.5–14.5)
ESCHERICHIA COLI K1, ECK1: NOT DETECTED
EST. AVERAGE GLUCOSE BLD GHB EST-MCNC: 103 MG/DL
GLOBULIN SER CALC-MCNC: 4.5 G/DL (ref 2–4)
GLUCOSE CSF-MCNC: 66 MG/DL (ref 40–70)
GLUCOSE SERPL-MCNC: 111 MG/DL (ref 65–100)
HAEMOPHILUS INFLUENZAE, HAEFLU: NOT DETECTED
HBA1C MFR BLD: 5.2 % (ref 4.2–6.3)
HCT VFR BLD AUTO: 36.2 % (ref 35–47)
HERPES SIMPLEX VIRUS 2, HSIMV2: NOT DETECTED
HGB BLD-MCNC: 12.2 G/DL (ref 11.5–16)
HSV1 DNA CSF QL NAA+PROBE: NOT DETECTED
HUMAN HERPESVIRUS 6, HUHV6: NOT DETECTED
HUMAN PARECHOVIRUS, HUPARV: NOT DETECTED
LISTERIA MONOCYTOGENES, LISMON: NOT DETECTED
LYMPHOCYTES NFR CSF MANUAL: 53 % (ref 28–96)
LYMPHOCYTES NFR CSF MANUAL: 57 % (ref 28–96)
MAGNESIUM SERPL-MCNC: 2 MG/DL (ref 1.6–2.4)
MCH RBC QN AUTO: 30.5 PG (ref 26–34)
MCHC RBC AUTO-ENTMCNC: 33.7 G/DL (ref 30–36.5)
MCV RBC AUTO: 90.5 FL (ref 80–99)
MONOCYTES NFR CSF MANUAL: 13 % (ref 16–56)
MONOCYTES NFR CSF MANUAL: 14 % (ref 16–56)
NEISSERIA MENINGITIDIS, NEIMEN: NOT DETECTED
NEUTROPHILS NFR CSF MANUAL: 30 % (ref 0–7)
NEUTROPHILS NFR CSF MANUAL: 33 % (ref 0–7)
NRBC # BLD: 0 K/UL (ref 0–0.01)
NRBC BLD-RTO: 0 PER 100 WBC
PHOSPHATE SERPL-MCNC: 2.7 MG/DL (ref 2.6–4.7)
PLATELET # BLD AUTO: 321 K/UL (ref 150–400)
PMV BLD AUTO: 10.8 FL (ref 8.9–12.9)
POTASSIUM SERPL-SCNC: 3.6 MMOL/L (ref 3.5–5.1)
PROT CSF-MCNC: 47 MG/DL (ref 15–45)
PROT SERPL-MCNC: 7 G/DL (ref 6.4–8.2)
RBC # BLD AUTO: 4 M/UL (ref 3.8–5.2)
RBC # CSF: 1 /CU MM
RBC # CSF: 201 /CU MM
SODIUM SERPL-SCNC: 135 MMOL/L (ref 136–145)
STREPTOCOCCUS AGALACTIAE, SAGA: NOT DETECTED
STREPTOCOCCUS PNEUMONIAE, STRPNE: NOT DETECTED
TUBE # CSF: 1
TUBE # CSF: 2
TUBE # CSF: 2
TUBE # CSF: 4
VARICELLA ZOSTER VIRUS, VAZOVI: NOT DETECTED
WBC # BLD AUTO: 20.6 K/UL (ref 3.6–11)
WBC # CSF: 11 /CU MM (ref 0–5)
WBC # CSF: 12 /CU MM (ref 0–5)

## 2018-08-14 PROCEDURE — 83036 HEMOGLOBIN GLYCOSYLATED A1C: CPT | Performed by: INTERNAL MEDICINE

## 2018-08-14 PROCEDURE — 65270000029 HC RM PRIVATE

## 2018-08-14 PROCEDURE — 74011000258 HC RX REV CODE- 258: Performed by: INTERNAL MEDICINE

## 2018-08-14 PROCEDURE — 80076 HEPATIC FUNCTION PANEL: CPT | Performed by: INTERNAL MEDICINE

## 2018-08-14 PROCEDURE — 74011250637 HC RX REV CODE- 250/637: Performed by: INTERNAL MEDICINE

## 2018-08-14 PROCEDURE — 93306 TTE W/DOPPLER COMPLETE: CPT

## 2018-08-14 PROCEDURE — 84157 ASSAY OF PROTEIN OTHER: CPT | Performed by: PSYCHIATRY & NEUROLOGY

## 2018-08-14 PROCEDURE — 84100 ASSAY OF PHOSPHORUS: CPT | Performed by: INTERNAL MEDICINE

## 2018-08-14 PROCEDURE — 94760 N-INVAS EAR/PLS OXIMETRY 1: CPT

## 2018-08-14 PROCEDURE — 86695 HERPES SIMPLEX TYPE 1 TEST: CPT | Performed by: PSYCHIATRY & NEUROLOGY

## 2018-08-14 PROCEDURE — 77030019701 HC TY LUMBR PUNC SIMS -A

## 2018-08-14 PROCEDURE — 009U3ZX DRAINAGE OF SPINAL CANAL, PERCUTANEOUS APPROACH, DIAGNOSTIC: ICD-10-PCS | Performed by: PSYCHIATRY & NEUROLOGY

## 2018-08-14 PROCEDURE — 86788 WEST NILE VIRUS AB IGM: CPT | Performed by: PSYCHIATRY & NEUROLOGY

## 2018-08-14 PROCEDURE — 74011250636 HC RX REV CODE- 250/636: Performed by: INTERNAL MEDICINE

## 2018-08-14 PROCEDURE — 77030003666 HC NDL SPINAL BD -A

## 2018-08-14 PROCEDURE — 86617 LYME DISEASE ANTIBODY: CPT | Performed by: PSYCHIATRY & NEUROLOGY

## 2018-08-14 PROCEDURE — 83735 ASSAY OF MAGNESIUM: CPT | Performed by: INTERNAL MEDICINE

## 2018-08-14 PROCEDURE — 36415 COLL VENOUS BLD VENIPUNCTURE: CPT | Performed by: INTERNAL MEDICINE

## 2018-08-14 PROCEDURE — 87798 DETECT AGENT NOS DNA AMP: CPT | Performed by: PSYCHIATRY & NEUROLOGY

## 2018-08-14 PROCEDURE — 86592 SYPHILIS TEST NON-TREP QUAL: CPT | Performed by: PSYCHIATRY & NEUROLOGY

## 2018-08-14 PROCEDURE — 89050 BODY FLUID CELL COUNT: CPT | Performed by: PSYCHIATRY & NEUROLOGY

## 2018-08-14 PROCEDURE — 85027 COMPLETE CBC AUTOMATED: CPT | Performed by: INTERNAL MEDICINE

## 2018-08-14 PROCEDURE — 82945 GLUCOSE OTHER FLUID: CPT | Performed by: PSYCHIATRY & NEUROLOGY

## 2018-08-14 PROCEDURE — 80048 BASIC METABOLIC PNL TOTAL CA: CPT | Performed by: INTERNAL MEDICINE

## 2018-08-14 PROCEDURE — 87483 CNS DNA AMP PROBE TYPE 12-25: CPT | Performed by: INTERNAL MEDICINE

## 2018-08-14 PROCEDURE — 87205 SMEAR GRAM STAIN: CPT | Performed by: PSYCHIATRY & NEUROLOGY

## 2018-08-14 RX ORDER — LIDOCAINE HYDROCHLORIDE 10 MG/ML
1-10 INJECTION, SOLUTION EPIDURAL; INFILTRATION; INTRACAUDAL; PERINEURAL
Status: COMPLETED | OUTPATIENT
Start: 2018-08-14 | End: 2018-08-14

## 2018-08-14 RX ORDER — KETOROLAC TROMETHAMINE 30 MG/ML
15 INJECTION, SOLUTION INTRAMUSCULAR; INTRAVENOUS
Status: DISPENSED | OUTPATIENT
Start: 2018-08-14 | End: 2018-08-16

## 2018-08-14 RX ADMIN — SODIUM CHLORIDE 3.38 G: 900 INJECTION, SOLUTION INTRAVENOUS at 13:50

## 2018-08-14 RX ADMIN — Medication 10 ML: at 16:58

## 2018-08-14 RX ADMIN — SODIUM CHLORIDE AND POTASSIUM CHLORIDE: 9; 1.49 INJECTION, SOLUTION INTRAVENOUS at 21:25

## 2018-08-14 RX ADMIN — KETOROLAC TROMETHAMINE 15 MG: 30 INJECTION, SOLUTION INTRAMUSCULAR at 16:58

## 2018-08-14 RX ADMIN — Medication 10 ML: at 13:50

## 2018-08-14 RX ADMIN — KETOROLAC TROMETHAMINE 15 MG: 30 INJECTION, SOLUTION INTRAMUSCULAR at 22:57

## 2018-08-14 RX ADMIN — KETOROLAC TROMETHAMINE 15 MG: 30 INJECTION, SOLUTION INTRAMUSCULAR at 02:11

## 2018-08-14 RX ADMIN — SODIUM CHLORIDE 3.38 G: 900 INJECTION, SOLUTION INTRAVENOUS at 06:22

## 2018-08-14 RX ADMIN — SODIUM CHLORIDE AND POTASSIUM CHLORIDE: 9; 1.49 INJECTION, SOLUTION INTRAVENOUS at 07:39

## 2018-08-14 RX ADMIN — Medication 10 ML: at 06:22

## 2018-08-14 RX ADMIN — LIDOCAINE HYDROCHLORIDE 7 ML: 10 INJECTION, SOLUTION EPIDURAL; INFILTRATION; INTRACAUDAL; PERINEURAL at 13:00

## 2018-08-14 RX ADMIN — DOCUSATE SODIUM 100 MG: 100 CAPSULE, LIQUID FILLED ORAL at 18:39

## 2018-08-14 RX ADMIN — BUTALBITAL, ACETAMINOPHEN, AND CAFFEINE 1 TABLET: 50; 325; 40 TABLET ORAL at 21:25

## 2018-08-14 RX ADMIN — DOCUSATE SODIUM 100 MG: 100 CAPSULE, LIQUID FILLED ORAL at 08:25

## 2018-08-14 RX ADMIN — KETOROLAC TROMETHAMINE 15 MG: 30 INJECTION, SOLUTION INTRAMUSCULAR at 10:52

## 2018-08-14 RX ADMIN — CEFTRIAXONE SODIUM 2 G: 2 INJECTION, POWDER, FOR SOLUTION INTRAMUSCULAR; INTRAVENOUS at 21:25

## 2018-08-14 RX ADMIN — ONDANSETRON 4 MG: 2 INJECTION INTRAMUSCULAR; INTRAVENOUS at 00:21

## 2018-08-14 RX ADMIN — Medication 10 ML: at 21:26

## 2018-08-14 RX ADMIN — ENOXAPARIN SODIUM 40 MG: 40 INJECTION SUBCUTANEOUS at 21:25

## 2018-08-14 NOTE — PROGRESS NOTES
Physical Therapy - Consult received and chart reviewed. Patient underwent lumbar puncture earlier this afternoon around noon. Instructions following lumbar puncture at that time: Bed Rest for 2 hours and Head of bed elevated no higher than 45 degrees for 6 hours. Therefore PT will defer OOB this afternoon and follow tomorrow for evaluation.  Thank you  Yajaira Bowers

## 2018-08-14 NOTE — CONSULTS
ID Consult Note    NAME:  Caleb rBownlee Friend                    Requesting Provider                            :   1950                      DOA   MRN:   [id number]   Date/Time:  2018 4:49 PM  Subjective:   REASON FOR CONSULT:       HPI/Hospital course   Friend is a 76 y.o. female with no significant medical history presents with headache of 10 days duration - On 8/3/18 pt had gone to [employer] and ate shrimps and developed a headache She took motrin round the clock. She went to work on Monday the . ( works as a medical administrator) . On  she could not go to work because of severe headache and chills and came to ED - They did a urine test and gave her keflex for UTI even though she clearly did not have any urinary symptoms/They discharged her on Fioricet . CT of ehad was normal . Blood culture was sent as she had fever  The blood culture came positive the next day and they tried to reach her  She came back to the ED on 8/10 with headache but was sent home as she had no fever.  She also was given a shot of decadron  She came back to the ED on  as advil was not helping her  She had taken 24 tablets of advil in a day  Blood culture was sent again and she was started on zosyn  She was seen by neurology and LP was done and I am asked to see her for the positive blood culture    Pt denies sore throat, runny nose, cough, sob, diarrhea, dysuria, joint pain or rash  She had a mosquito bite 10 days and the area on the rt arm was swollen and red for a few days  She has no travel history  No animal contact  No tick bites  No steroid injections  No hardware in her body  No recent dental work  Not sexually active in 10 yrs  She takes care 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        PMH-seasonal allergy    PSH-hysterectomy    SOCX- non smoker  occasional alcohol  No drugs    FAMHX- mother DM    ALLERGY -morphine  Medications  Allegra and B12        REVIEW OF SYSTEMS:     Const:  fever, chills, negative weight loss  Eyes:   negative diplopia or visual changes, negative eye pain  ENT:   negative coryza, negative sore throat  Resp:   negative cough, hemoptysis, dyspnea  Cards:  negative for chest pain, palpitations, lower extremity edema  :  negative for frequency, dysuria and hematuria  Skin:   negative for rash and pruritus  Heme:  negative for easy bruising and gum/nose bleeding  MS:  negative for myalgias, arthralgias, back pain and muscle weakness  Neurolo:  Headaches, No dizziness, vertigo, memory problems   Psych:  negative for feelings of anxiety, depression       Objective:   VITALS:    Visit Vitals    /65 (BP 1 Location: Left arm, BP Patient Position: At rest)    Pulse 75    Temp 98.8 °F (37.1 °C)    Resp 18    Ht 5' 8\" (1.727 m)    Wt 214 lb (97.1 kg)    SpO2 96%    BMI 32.54 kg/m2     Temp (24hrs), Av.2 °F (37.3 °C), Min:98.4 °F (36.9 °C), Max:100.5 °F (38.1 °C)    PHYSICAL EXAM:   General:    Alert, cooperative, no distress, appears stated age. Head:   Normocephalic, without obvious abnormality, atraumatic. Eyes:   Conjunctivae clear, anicteric sclerae. Pupils are equal  Nose:  Nares normal. No drainage or sinus tenderness. Throat:    Lips, mucosa, and tongue normal.  No Thrush  Many absent teeth- poor dentition  Neck:  Supple, symmetrical,  no adenopathy, thyroid: non tender    no carotid bruit and no JVD. Back:    No CVA tenderness. Lungs:   Clear to auscultation bilaterally. No Wheezing or Rhonchi. No rales. Heart:   Regular rate and rhythm,  no murmur, rub or gallop. Abdomen:   Soft, non-tender,not distended. Bowel sounds normal. No masses  Extremities: Extremities normal, atraumatic, no cyanosis. No edema. No clubbing  Skin:     No rashes or lesions.   Not Jaundiced  Lymph: Cervical, supraclavicular normal.  Neurologic: Grossly non-focal    Pertinent Labs   Wbc 20.6  Hb 12.2    Cr 0.82  ALT 29  AST 20  Alk po4 -95  LP csf -RBC 1  WBC 11  N 33  L 53  M 14  Protein 52    Microbiology  BC-8/7 alpha strep  8/13 gram positive cocci in pairs  IMAGING RESULTS:  CT head N    Impression/Recommendation  76 y.o. female with no significant medical history presents with headache of 10 days duration and intermittent fever but masked by NSAID    Alpha streptococcus bacteremia-  leucocytosis   unusual presentation  Unclear source- can have a deep source   oral cavity-poor dentition-  2 separate positive cultures over a period of 5 days positive  Alpha strep could be strep oralis/mitis/viridans  Dc zosyn and change to ceftriaxone  Will need OBED to r/o endocarditis  Need CT abdomen/pelvis  May need tagged WBC scan  The LP findings are not suggestive of bacterial meningitis- mycotic seeding from endocarditis is also not possible  Could very well be a NSAID effect    Severe headache- there is an element of rebound headache from too much NSAID use    Trichomonas in the urine from 8/7 - pt not sexually active in 10 yrs- she got metranidazole  Pt agreed to have HIV test    ___________________________________________________    Discussed with patient,and her nurse

## 2018-08-14 NOTE — PROGRESS NOTES
[address]  [address], [place], [address] [facility]  [phone]      Medical Progress Note      NAME: Harshad Perry   :  1950  MRM:  [id number]    Date/Time: 2018  9:19 AM       Assessment and Plan:   1. Bacteremia/Leukocytosis. not septic. Blood Cx on  with 1/2 alpha-strep. Unclear source. Patient has been on Keflex. Check repeated BC. Check Echo to r/o endocarditis. On IV Zosyn. Consult ID     2. Intractable headache: Hx of migraine in the past. Head CT and MRI of the brain: negative. Consult Neurology. Pain management ( pain better with toradol).              Subjective:     Chief Complaint:  Follow up of pt who was admitted with bacteremia. Has frontal HA.     ROS:  (bold if positive, if negative)      Tolerating PT  Tolerating Diet        Objective:     Last 24hrs VS reviewed since prior progress note.  Most recent are:    Visit Vitals    /71 (BP 1 Location: Left arm, BP Patient Position: At rest)    Pulse 77    Temp 98.4 °F (36.9 °C)    Resp 19    Ht 5' 8\" (1.727 m)    Wt 97.1 kg (214 lb)    SpO2 96%    BMI 32.54 kg/m2     SpO2 Readings from Last 6 Encounters:   18 96%   08/10/18 97%   18 94%        No intake or output data in the 24 hours ending 18 0919     Physical Exam:    Gen:  Well-developed, well-nourished, in no acute distress  HEENT:  Pink conjunctivae, PERRL, hearing intact to voice, moist mucous membranes  Neck:  Supple, without masses, thyroid non-tender  Resp:  No accessory muscle use, clear breath sounds without wheezes rales or rhonchi  Card:  No murmurs, normal S1, S2 without thrills, bruits or peripheral edema  Abd:  Soft, non-tender, non-distended, normoactive bowel sounds are present, no palpable organomegaly and no detectable hernias  Lymph:  No cervical or inguinal adenopathy  Musc:  No cyanosis or clubbing  Skin:  No rashes or ulcers, skin turgor is good  Neuro:  Cranial nerves are grossly intact, no focal motor weakness, follows commands appropriately  Psych:  Good insight, oriented to person, place and time, alert  __________________________________________________________________  Medications Reviewed: (see below)  Medications:     Current Facility-Administered Medications   Medication Dose Route Frequency    butalbital-acetaminophen-caffeine (FIORICET, ESGIC) -40 mg per tablet 1 Tab  1 Tab Oral Q4H PRN    0.9% sodium chloride with KCl 20 mEq/L infusion   IntraVENous CONTINUOUS    sodium chloride (NS) flush 5-10 mL  5-10 mL IntraVENous Q8H    sodium chloride (NS) flush 5-10 mL  5-10 mL IntraVENous PRN    HYDROcodone-acetaminophen (NORCO) 5-325 mg per tablet 1 Tab  1 Tab Oral Q4H PRN    HYDROmorphone (PF) (DILAUDID) injection 0.5 mg  0.5 mg IntraVENous Q4H PRN    naloxone (NARCAN) injection 0.4 mg  0.4 mg IntraVENous PRN    diphenhydrAMINE (BENADRYL) injection 12.5 mg  12.5 mg IntraVENous Q4H PRN    ondansetron (ZOFRAN) injection 4 mg  4 mg IntraVENous Q6H PRN    docusate sodium (COLACE) capsule 100 mg  100 mg Oral BID    enoxaparin (LOVENOX) injection 40 mg  40 mg SubCUTAneous Q24H    ketorolac (TORADOL) injection 15 mg  15 mg IntraVENous Q8H PRN    piperacillin-tazobactam (ZOSYN) 3.375 g in 0.9% sodium chloride (MBP/ADV) 100 mL ADV  3.375 g IntraVENous Q8H        Lab Data Reviewed: (see below)  Lab Review:     Recent Labs      08/14/18   0326  08/13/18   1242   WBC  20.6*  19.1*   HGB  12.2  14.8   HCT  36.2  43.4   PLT  321  365     Recent Labs      08/14/18   0326  08/13/18   1544   NA  135*  137   K  3.6  4.0   CL  103  102   CO2  27  28   GLU  111*  109*   BUN  10  12   CREA  0.82  0.89   CA  8.3*  9.1   MG  2.0   --    PHOS  2.7   --    ALB  2.5*  2.9*   TBILI  0.6  0.5   SGOT  20  20   ALT  29  29     No results found for: GLUCPOC  No results for input(s): PH, PCO2, PO2, HCO3, FIO2 in the last 72 hours. No results for input(s): INR in the last 72 hours.     No lab exists for component: INREXT  All Micro Results     Procedure Component Value Units Date/Time    CULTURE, BLOOD [[id number]] Collected:  08/13/18 1544    Order Status:  Completed Specimen:  Blood from Blood Updated:  08/14/18 0817     Special Requests: NO SPECIAL REQUESTS        Culture result: NO GROWTH AFTER 15 HOURS       CULTURE, URINE [[id number]] Collected:  08/13/18 1447    Order Status:  Completed Specimen:  Urine from Clean catch Updated:  08/13/18 1800    URINE CULTURE HOLD SAMPLE [[id number]] Collected:  08/13/18 1447    Order Status:  Completed Specimen:  Urine from Serum Updated:  08/13/18 1449     Urine culture hold         URINE ON HOLD IN MICROBIOLOGY DEPT FOR 3 DAYS. IF UNPRESERVED URINE IS SUBMITTED, IT CANNOT BE USED FOR ADDITIONAL TESTING AFTER 24 HRS, RECOLLECTION WILL BE REQUIRED. CULTURE, BLOOD [[id number]]     Order Status:  Sent Specimen:  Blood           I have reviewed notes of prior 24hr. Other pertinent lab:       Total time spent with patient: [address] discussed with: Patient, Nursing Staff and >50% of time spent in counseling and coordination of care    Discussed:  Care Plan    Prophylaxis:  Lovenox    Disposition:  Home w/Family           ___________________________________________________    Attending Physician: Jesica Ca MD

## 2018-08-14 NOTE — PROCEDURES
Lumbar Puncture Procedure Note    Pre-operative Diagnosis: bacteremia    Post-operative Diagnosis: same    Indications: Diagnostic    Procedure Details     Consent: Informed consent was obtained. Risks of the procedure were discussed including: infection, bleeding, pain and headache. Under sterile conditions the patient was positioned. Betadine solution and sterile drapes were utilized. A spinal needle 22 was inserted at the L4 - L5 interspace. Spinal fluid was obtained and sent to the laboratory. Findings  8cc of clear spinal fluid was obtained in 4 tubes and sent to lab. Opening Pressure: 0cm H2O pressure  Closing Pressure 0cm H2O pressure    Complications:  None; patient tolerated the procedure well. Condition: stable    Plan  Bed Rest for 2 hours  Head of bed elevated no higher than 45 degrees for 6 hours  Tylenol 650 mg. for pain  If severe headache, nausea, vomiting, fever >100.5 F or changes call office.       Attending Attestation: Annemarie Purcell MD  8/14/2018  12:39 PM

## 2018-08-14 NOTE — CONSULTS
[address] [facility]  MR#: [id number]  : 1950  ACCOUNT #: [de-identified]   DATE OF SERVICE: 2018    CONSULTED BY:   Dr. Abarca Pellet:  Headache. HISTORY OF PRESENT ILLNESS:  The patient is a 70-year-old female who presented to the emergency room yesterday with complaints of headache. Patient reports that she has had 10 days of headache. She notes that she does have a history of migraines in the remote past, but nothing recently. She started having a headache about 10 days ago after eating at [employer]. She noted that the headache was over her entire head and was centered more on her forehead. She did have some light sensitivity, but no associated nausea. She did come to the emergency room after several days and was found to have a UTI and treated with Keflex. She took this course, and several days later, returned to the emergency room again because she continued to have headache. Upon evaluation in the emergency room, she did have a white count of 19.1 and there was concern for bacteremia. She also was febrile with a low-grade fever of 100.5. Otherwise, vitals stable. When I came to see the patient, she reported that she is doing better in terms of headache when she is taking the Toradol. She does have some stiffness in her neck and continues to have some photophobia. She denies any recent sick contacts. She denies any recent travel. She denies any medication changes. She denies any exposure to ticks. Thinks that maybe she has had some exposure to mosquitoes, but is not sure. PAST MEDICAL HISTORY:  No significant past medical history. SOCIAL HISTORY:  Patient is single. Does not smoke, does not drink. FAMILY HISTORY:  Diabetes and heart attack. ALLERGIES:  MORPHINE. MEDICATIONS:  Fioricet, Toradol, Norco, Dilaudid, Narcan, Benadryl, Zofran, Colace, Lovenox and Zosyn.     REVIEW OF SYSTEMS:  CONSTITUTIONAL:  Admits to recent fevers and chills. ENT:  Denies hearing loss, ringing in ears. EYES:  Denies vision changes, but does have some light sensitivity. CARDIOVASCULAR:  Denies chest pain. RESPIRATORY:  No shortness of breath, cough or wheezing. GASTROINTESTINAL:  Denies nausea, vomiting, constipation, diarrhea. MUSCULOSKELETAL:  Denies joint pain. INTEGUMENT:  Denies rash. NEUROLOGIC:  Per HPI. PSYCHIATRIC:  Denies anxiety or depression. CLINICAL DATA REVIEW:    IMAGING:  CT of the head showed no acute process. On 08/07/2018 she did have an MRI of the brain without contrast done overnight, that was negative. I personally reviewed these images in the PACS system and this is my impression. LABORATORY DATA:  Labs show a urine culture that was negative. UA that was negative. CBC with a white count of 19.1, down to 17 today. Metabolic panel within normal limits. Lactic acid of 1.1. Hemoglobin A1c of 5.2, magnesium of 2.0, phosphorus of 2.7. PHYSICAL EXAMINATION:  VITAL SIGNS:  Blood pressure 129/71, pulse 77, temperature 98.4, respirations 19, oxygen saturation 96%. GENERAL:  The patient is alert, cooperative, no distress. HEENT:  Normocephalic abnormality. EYES:  Conjunctivae clear. Pupils equally round, reactive to light. Extraocular movements intact. Visual fields full. No papilledema. LUNGS:  Unlabored breathing. HEART:  Regular rate and rhythm. ABDOMEN:  Soft, nondistended. EXTREMITIES:  Normal, atraumatic. No cyanosis or edema. Pulses 2+ and symmetric. SKIN:  Color, texture are normal.  NEUROLOGIC:  General attention normal.  Language, naming, repetition, fluency are normal.  Memory is intact to recent and remote memory. Cranial nerves II-XII:  Visual fields full. Pupils are equally round and reactive to light. Extraocular movements intact. Face is symmetric. Tongue and palate are midline. MOTOR:  Normal bulk and tone. No tremor.   Strength is 5/5 in all 4 extremities. Sensory is intact to light touch, pinprick, vibration and temperature. COORDINATION:  Finger-to-nose intact. GAIT:  Deferred. REFLEXES:  2+ throughout. IMPRESSION:  This is a 69-year-old female who presented with intractable headache x10 days. She is having some improvement with Toradol. She does have an elevated white count and fever with unclear etiology. There is a question of bacteremia and she is on Zosyn. RECOMMENDATIONS:  1. MRI of the brain negative. 2.  Continue Toradol and will adjust this to every 6 hours to help with pain. 3.  ID consult for bacteremia. 4.  We will do lumbar puncture as patient has no clear source and headache is her main symptom. I would like to see what her spinal fluid profile looks like. 5.  Would limit other sedating medications for the patient if possible. 6.  DVT prophylaxis with Lovenox. Thank you very much for this consultation. We will follow up with above studies and give further recommendations as indicated.       MD AD Castillo / LATRICE  D: 08/14/2018 10:06     T: 08/14/2018 13:01  JOB #: [id number]

## 2018-08-14 NOTE — PROGRESS NOTES
TRANSFER - IN REPORT:    Verbal report received from [address] Kaleigh RN(name) on Nat Andersen Friend  being received from [facility](unit) for routine progression of care      Report consisted of patients Situation, Background, Assessment and   Recommendations(SBAR). Information from the following report(s) SBAR, Kardex, ED Summary, Intake/Output, MAR, Recent Results and Med Rec Status was reviewed with the receiving nurse. Opportunity for questions and clarification was provided. Assessment completed upon patients arrival to unit and care assumed.          Primary Nurse Carey Oseguera RN and Gretchen Becker RN performed a dual skin assessment on this patient No impairment noted  Guillermo score is 23

## 2018-08-14 NOTE — ROUTINE PROCESS
0700: Bedside and Verbal shift change report given to Tammy RN (oncoming nurse) by Jonn Dalton RN (offgoing nurse). Report included the following information SBAR, Kardex, Procedure Summary, Intake/Output, MAR, Accordion, Recent Results and Med Rec Status. 1900: Bedside and Verbal shift change report given to Hattie Pete RN (oncoming nurse) by CORIN Munoz (offgoing nurse). Report included the following information SBAR, Kardex, Procedure Summary, Intake/Output, MAR, Accordion, Recent Results and Med Rec Status.

## 2018-08-14 NOTE — PROGRESS NOTES
Problem: Falls - Risk of  Goal: *Absence of Falls  Document Neelima Fall Risk and appropriate interventions in the flowsheet.    Outcome: Progressing Towards Goal  Fall Risk Interventions:            Medication Interventions: Patient to call before getting OOB

## 2018-08-14 NOTE — PROGRESS NOTES
Bedside and Verbal shift change report given to CORIN Lima (oncoming nurse) by Dg Perez RN (offgoing nurse). Report included the following information SBAR, Kardex, Intake/Output, MAR, Recent Results and Med Rec Status.

## 2018-08-15 LAB
ANION GAP SERPL CALC-SCNC: 7 MMOL/L (ref 5–15)
BACTERIA SPEC CULT: NORMAL
BASOPHILS # BLD: 0 K/UL (ref 0–0.1)
BASOPHILS NFR BLD: 0 % (ref 0–1)
BUN SERPL-MCNC: 12 MG/DL (ref 6–20)
BUN/CREAT SERPL: 16 (ref 12–20)
CALCIUM SERPL-MCNC: 8.3 MG/DL (ref 8.5–10.1)
CC UR VC: NORMAL
CHLORIDE SERPL-SCNC: 104 MMOL/L (ref 97–108)
CO2 SERPL-SCNC: 25 MMOL/L (ref 21–32)
CREAT SERPL-MCNC: 0.76 MG/DL (ref 0.55–1.02)
DIFFERENTIAL METHOD BLD: ABNORMAL
EOSINOPHIL # BLD: 0.1 K/UL (ref 0–0.4)
EOSINOPHIL NFR BLD: 1 % (ref 0–7)
ERYTHROCYTE [DISTWIDTH] IN BLOOD BY AUTOMATED COUNT: 12.7 % (ref 11.5–14.5)
GLUCOSE SERPL-MCNC: 105 MG/DL (ref 65–100)
HCT VFR BLD AUTO: 34.2 % (ref 35–47)
HERPES SIMPLEX VIRUS, CSF, UHSPT: NORMAL
HGB BLD-MCNC: 11.5 G/DL (ref 11.5–16)
HIV 1+2 AB+HIV1 P24 AG SERPL QL IA: NONREACTIVE
HIV12 RESULT COMMENT, HHIVC: NORMAL
IMM GRANULOCYTES # BLD: 0.1 K/UL (ref 0–0.04)
IMM GRANULOCYTES NFR BLD AUTO: 1 % (ref 0–0.5)
LYMPHOCYTES # BLD: 2.1 K/UL (ref 0.8–3.5)
LYMPHOCYTES NFR BLD: 15 % (ref 12–49)
MCH RBC QN AUTO: 30.6 PG (ref 26–34)
MCHC RBC AUTO-ENTMCNC: 33.6 G/DL (ref 30–36.5)
MCV RBC AUTO: 91 FL (ref 80–99)
MONOCYTES # BLD: 1.1 K/UL (ref 0–1)
MONOCYTES NFR BLD: 8 % (ref 5–13)
NEUTS SEG # BLD: 10.4 K/UL (ref 1.8–8)
NEUTS SEG NFR BLD: 76 % (ref 32–75)
NRBC # BLD: 0 K/UL (ref 0–0.01)
NRBC BLD-RTO: 0 PER 100 WBC
PLATELET # BLD AUTO: 310 K/UL (ref 150–400)
PMV BLD AUTO: 10.6 FL (ref 8.9–12.9)
POTASSIUM SERPL-SCNC: 3.9 MMOL/L (ref 3.5–5.1)
RBC # BLD AUTO: 3.76 M/UL (ref 3.8–5.2)
SERVICE CMNT-IMP: NORMAL
SODIUM SERPL-SCNC: 136 MMOL/L (ref 136–145)
WBC # BLD AUTO: 13.7 K/UL (ref 3.6–11)

## 2018-08-15 PROCEDURE — 74011250636 HC RX REV CODE- 250/636: Performed by: INTERNAL MEDICINE

## 2018-08-15 PROCEDURE — 87389 HIV-1 AG W/HIV-1&-2 AB AG IA: CPT | Performed by: INTERNAL MEDICINE

## 2018-08-15 PROCEDURE — 94760 N-INVAS EAR/PLS OXIMETRY 1: CPT

## 2018-08-15 PROCEDURE — 36415 COLL VENOUS BLD VENIPUNCTURE: CPT | Performed by: INTERNAL MEDICINE

## 2018-08-15 PROCEDURE — 99152 MOD SED SAME PHYS/QHP 5/>YRS: CPT

## 2018-08-15 PROCEDURE — 74011000258 HC RX REV CODE- 258: Performed by: INTERNAL MEDICINE

## 2018-08-15 PROCEDURE — 74011250637 HC RX REV CODE- 250/637: Performed by: INTERNAL MEDICINE

## 2018-08-15 PROCEDURE — 93312 ECHO TRANSESOPHAGEAL: CPT

## 2018-08-15 PROCEDURE — 74011000250 HC RX REV CODE- 250: Performed by: INTERNAL MEDICINE

## 2018-08-15 PROCEDURE — 85025 COMPLETE CBC W/AUTO DIFF WBC: CPT | Performed by: INTERNAL MEDICINE

## 2018-08-15 PROCEDURE — 80048 BASIC METABOLIC PNL TOTAL CA: CPT | Performed by: INTERNAL MEDICINE

## 2018-08-15 PROCEDURE — 65270000029 HC RM PRIVATE

## 2018-08-15 PROCEDURE — B24BZZ4 ULTRASONOGRAPHY OF HEART WITH AORTA, TRANSESOPHAGEAL: ICD-10-PCS | Performed by: INTERNAL MEDICINE

## 2018-08-15 RX ORDER — PREDNISONE 20 MG/1
20 TABLET ORAL
Status: DISPENSED | OUTPATIENT
Start: 2018-08-15 | End: 2018-08-20

## 2018-08-15 RX ORDER — KETOROLAC TROMETHAMINE 30 MG/ML
15 INJECTION, SOLUTION INTRAMUSCULAR; INTRAVENOUS ONCE
Status: COMPLETED | OUTPATIENT
Start: 2018-08-15 | End: 2018-08-15

## 2018-08-15 RX ORDER — LIDOCAINE 50 MG/G
OINTMENT TOPICAL AS NEEDED
Status: DISCONTINUED | OUTPATIENT
Start: 2018-08-15 | End: 2018-08-15 | Stop reason: HOSPADM

## 2018-08-15 RX ORDER — LIDOCAINE HYDROCHLORIDE 20 MG/ML
JELLY TOPICAL ONCE
Status: COMPLETED | OUTPATIENT
Start: 2018-08-15 | End: 2018-08-15

## 2018-08-15 RX ORDER — LIDOCAINE HYDROCHLORIDE 20 MG/ML
15 SOLUTION OROPHARYNGEAL AS NEEDED
Status: DISCONTINUED | OUTPATIENT
Start: 2018-08-15 | End: 2018-08-15 | Stop reason: HOSPADM

## 2018-08-15 RX ORDER — MIDAZOLAM HYDROCHLORIDE 1 MG/ML
.5-1 INJECTION, SOLUTION INTRAMUSCULAR; INTRAVENOUS
Status: DISCONTINUED | OUTPATIENT
Start: 2018-08-15 | End: 2018-08-15 | Stop reason: HOSPADM

## 2018-08-15 RX ORDER — FENTANYL CITRATE 50 UG/ML
25-100 INJECTION, SOLUTION INTRAMUSCULAR; INTRAVENOUS
Status: DISCONTINUED | OUTPATIENT
Start: 2018-08-15 | End: 2018-08-15 | Stop reason: HOSPADM

## 2018-08-15 RX ADMIN — LIDOCAINE: 50 OINTMENT TOPICAL at 14:36

## 2018-08-15 RX ADMIN — KETOROLAC TROMETHAMINE 15 MG: 30 INJECTION, SOLUTION INTRAMUSCULAR at 17:33

## 2018-08-15 RX ADMIN — DOCUSATE SODIUM 100 MG: 100 CAPSULE, LIQUID FILLED ORAL at 17:33

## 2018-08-15 RX ADMIN — KETOROLAC TROMETHAMINE 15 MG: 30 INJECTION, SOLUTION INTRAMUSCULAR at 18:32

## 2018-08-15 RX ADMIN — FENTANYL CITRATE 25 MCG: 50 INJECTION, SOLUTION INTRAMUSCULAR; INTRAVENOUS at 14:52

## 2018-08-15 RX ADMIN — ENOXAPARIN SODIUM 40 MG: 40 INJECTION SUBCUTANEOUS at 22:04

## 2018-08-15 RX ADMIN — BENZOCAINE 2 SPRAY: 200 SPRAY DENTAL; ORAL; PERIODONTAL at 14:50

## 2018-08-15 RX ADMIN — ONDANSETRON 4 MG: 2 INJECTION INTRAMUSCULAR; INTRAVENOUS at 22:39

## 2018-08-15 RX ADMIN — LIDOCAINE HYDROCHLORIDE: 20 JELLY TOPICAL at 14:53

## 2018-08-15 RX ADMIN — MIDAZOLAM 1 MG: 1 INJECTION INTRAMUSCULAR; INTRAVENOUS at 14:52

## 2018-08-15 RX ADMIN — DOCUSATE SODIUM 100 MG: 100 CAPSULE, LIQUID FILLED ORAL at 08:29

## 2018-08-15 RX ADMIN — BUTALBITAL, ACETAMINOPHEN, AND CAFFEINE 1 TABLET: 50; 325; 40 TABLET ORAL at 02:01

## 2018-08-15 RX ADMIN — CEFTRIAXONE SODIUM 2 G: 2 INJECTION, POWDER, FOR SOLUTION INTRAMUSCULAR; INTRAVENOUS at 22:04

## 2018-08-15 RX ADMIN — FENTANYL CITRATE 25 MCG: 50 INJECTION, SOLUTION INTRAMUSCULAR; INTRAVENOUS at 15:04

## 2018-08-15 RX ADMIN — DIPHENHYDRAMINE HYDROCHLORIDE 12.5 MG: 50 INJECTION, SOLUTION INTRAMUSCULAR; INTRAVENOUS at 14:08

## 2018-08-15 RX ADMIN — BUTALBITAL, ACETAMINOPHEN, AND CAFFEINE 1 TABLET: 50; 325; 40 TABLET ORAL at 14:08

## 2018-08-15 RX ADMIN — HYDROMORPHONE HYDROCHLORIDE 0.5 MG: 2 INJECTION, SOLUTION INTRAMUSCULAR; INTRAVENOUS; SUBCUTANEOUS at 22:39

## 2018-08-15 RX ADMIN — MIDAZOLAM 1 MG: 1 INJECTION INTRAMUSCULAR; INTRAVENOUS at 14:56

## 2018-08-15 RX ADMIN — Medication 10 ML: at 14:08

## 2018-08-15 RX ADMIN — Medication 10 ML: at 05:07

## 2018-08-15 RX ADMIN — LIDOCAINE HYDROCHLORIDE 15 ML: 20 SOLUTION ORAL; TOPICAL at 14:32

## 2018-08-15 RX ADMIN — Medication 10 ML: at 22:05

## 2018-08-15 RX ADMIN — KETOROLAC TROMETHAMINE 15 MG: 30 INJECTION, SOLUTION INTRAMUSCULAR at 11:03

## 2018-08-15 RX ADMIN — KETOROLAC TROMETHAMINE 15 MG: 30 INJECTION, SOLUTION INTRAMUSCULAR at 05:06

## 2018-08-15 NOTE — PROGRESS NOTES
Neurology Progress Note    Patient ID:  Delio Jackson  [id number]  76 y.o.  1950    Chief Complaint: my head feels strange    Subjective:   Pt does report a post LP headache overnight that has improved. She continues with the all over headache. We discussed the possibility of rebound HA. Objective: All records in [facility] reviewed and noted    ROS:  Per HPI  All other 12 pt ROS negative    Meds:  Current Facility-Administered Medications   Medication Dose Route Frequency    iohexol (OMNIPAQUE) solution 30 mL  30 mL Oral ONCE    iopamidol (ISOVUE-370) 76 % injection 100 mL  100 mL IntraVENous RAD ONCE    predniSONE (DELTASONE) tablet 20 mg  20 mg Oral DAILY WITH BREAKFAST    ketorolac (TORADOL) injection 15 mg  15 mg IntraVENous Q6H PRN    cefTRIAXone (ROCEPHIN) 2 g in 0.9% sodium chloride (MBP/ADV) 50 mL  2 g IntraVENous Q24H    butalbital-acetaminophen-caffeine (FIORICET, ESGIC) -40 mg per tablet 1 Tab  1 Tab Oral Q4H PRN    0.9% sodium chloride with KCl 20 mEq/L infusion   IntraVENous CONTINUOUS    sodium chloride (NS) flush 5-10 mL  5-10 mL IntraVENous Q8H    sodium chloride (NS) flush 5-10 mL  5-10 mL IntraVENous PRN    HYDROcodone-acetaminophen (NORCO) 5-325 mg per tablet 1 Tab  1 Tab Oral Q4H PRN    HYDROmorphone (PF) (DILAUDID) injection 0.5 mg  0.5 mg IntraVENous Q4H PRN    naloxone (NARCAN) injection 0.4 mg  0.4 mg IntraVENous PRN    diphenhydrAMINE (BENADRYL) injection 12.5 mg  12.5 mg IntraVENous Q4H PRN    ondansetron (ZOFRAN) injection 4 mg  4 mg IntraVENous Q6H PRN    docusate sodium (COLACE) capsule 100 mg  100 mg Oral BID    enoxaparin (LOVENOX) injection 40 mg  40 mg SubCUTAneous Q24H       Imaging:  MRI brain: neg  LP below with relatively benign picture except some WBC which is likely due to NSAID use.      Lab Review   Recent Results (from the past 24 hour(s))   CELL COUNT, CSF    Collection Time: 08/14/18  1:09 PM   Result Value Ref Range    CSF TUBE NO. 1      CSF COLOR COLORLESS COL      CSF APPEARANCE CLEAR CLEAR      CSF RBCS 1 (H) 0 /cu mm    CSF WBCS 11 (H) 0 - 5 /cu mm    CSF Neutrophils 33 (H) 0 - 7 %    CSF LYMPH 53 28 - 96 %    CSF MONO 14 (L) 16 - 56 %   GLUCOSE, CSF    Collection Time: 08/14/18  1:11 PM   Result Value Ref Range    Tube No. 2      Glucose,CSF 66 40 - 70 MG/DL   PROTEIN, CSF    Collection Time: 08/14/18  1:11 PM   Result Value Ref Range    Tube No. 2      Protein,CSF 47 (H) 15 - 45 MG/DL   CELL COUNT, CSF    Collection Time: 08/14/18  1:12 PM   Result Value Ref Range    CSF TUBE NO. 4      CSF COLOR COLORLESS COL      CSF APPEARANCE CLEAR CLEAR      CSF RBCS 201 (H) 0 /cu mm    CSF WBCS 12 (H) 0 - 5 /cu mm    CSF Neutrophils 30 (H) 0 - 7 %    CSF LYMPH 57 28 - 96 %    CSF MONO 13 (L) 16 - 56 %   CULTURE, CSF W GRAM STAIN    Collection Time: 08/14/18  1:12 PM   Result Value Ref Range    Special Requests: NO SPECIAL REQUESTS      GRAM STAIN RARE WBCS SEEN      GRAM STAIN NO ORGANISMS SEEN      GRAM STAIN Smear Reviewed by Microbiology      Culture result: Culture performed on Unspun Fluid     MENINGITIS PATHOGENS PANEL, CSF (BY PCR)    Collection Time: 08/14/18  5:00 PM   Result Value Ref Range    Escherichia coli K1 NOT DETECTED NOTD      Haemophilus Influenzae NOT DETECTED NOTD      Listeria Monocytogenes NOT DETECTED NOTD      Neisseria Meningitidis NOT DETECTED NOTD      Streptococcus Agalactiae NOT DETECTED NOTD      Streptococcus Pneumoniae NOT DETECTED NOTD      Cytomegalovirus NOT DETECTED NOTD      Enterovirus NOT DETECTED NOTD      Herpes Simplex Virus 1 NOT DETECTED NOTD      Herpes Simplex Virus 2 NOT DETECTED NOTD      Human Herpesvirus 6 NOT DETECTED NOTD      Human Parechovirus NOT DETECTED NOTD      Varicella Zoster Virus NOT DETECTED NOTD      Crypto.  neoformans/gattii NOT DETECTED NOTD     CBC WITH AUTOMATED DIFF    Collection Time: 08/15/18  3:40 AM   Result Value Ref Range    WBC 13.7 (H) 3.6 - 11.0 K/uL    RBC 3.76 (L) 3.80 - 5.20 M/uL    HGB 11.5 11.5 - 16.0 g/dL    HCT 34.2 (L) 35.0 - 47.0 %    MCV 91.0 80.0 - 99.0 FL    MCH 30.6 26.0 - 34.0 PG    MCHC 33.6 30.0 - 36.5 g/dL    RDW 12.7 11.5 - 14.5 %    PLATELET 127 884 - 497 K/uL    MPV 10.6 8.9 - 12.9 FL    NRBC 0.0 0  WBC    ABSOLUTE NRBC 0.00 0.00 - 0.01 K/uL    NEUTROPHILS 76 (H) 32 - 75 %    LYMPHOCYTES 15 12 - 49 %    MONOCYTES 8 5 - 13 %    EOSINOPHILS 1 0 - 7 %    BASOPHILS 0 0 - 1 %    IMMATURE GRANULOCYTES 1 (H) 0.0 - 0.5 %    ABS. NEUTROPHILS 10.4 (H) 1.8 - 8.0 K/UL    ABS. LYMPHOCYTES 2.1 0.8 - 3.5 K/UL    ABS. MONOCYTES 1.1 (H) 0.0 - 1.0 K/UL    ABS. EOSINOPHILS 0.1 0.0 - 0.4 K/UL    ABS. BASOPHILS 0.0 0.0 - 0.1 K/UL    ABS. IMM. GRANS. 0.1 (H) 0.00 - 0.04 K/UL    DF AUTOMATED     METABOLIC PANEL, BASIC    Collection Time: 08/15/18  3:40 AM   Result Value Ref Range    Sodium 136 136 - 145 mmol/L    Potassium 3.9 3.5 - 5.1 mmol/L    Chloride 104 97 - 108 mmol/L    CO2 25 21 - 32 mmol/L    Anion gap 7 5 - 15 mmol/L    Glucose 105 (H) 65 - 100 mg/dL    BUN 12 6 - 20 MG/DL    Creatinine 0.76 0.55 - 1.02 MG/DL    BUN/Creatinine ratio 16 12 - 20      GFR est AA >60 >60 ml/min/1.73m2    GFR est non-AA >60 >60 ml/min/1.73m2    Calcium 8.3 (L) 8.5 - 10.1 MG/DL   HIV 1/2 AG/AB, 4TH GENERATION,W RFLX CONFIRM    Collection Time: 08/15/18  3:40 AM   Result Value Ref Range    HIV 1/2 Interpretation NONREACTIVE NR      HIV 1/2 result comment SEE NOTE         Exam:  Visit Vitals    /60 (BP 1 Location: Right arm, BP Patient Position: At rest)    Pulse 73    Temp 99.5 °F (37.5 °C)    Resp 17    Ht 5' 8\" (1.727 m)    Wt 97.1 kg (214 lb)    SpO2 95%    BMI 32.54 kg/m2     Gen:  Well developed  CV: RRR  Lungs: non labored breathing  Abd: non distending  Neuro: A&O x 3, no dysarthria or aphasia  CN II-XII: PERRL, EOMI, face symmetric, tongue/palate midline  Motor: strength 5/5 all four ext  Sensory: intact to LT  Gait: normal    Assessment:     Patient Active Problem List   Diagnosis Code    Bacteremia R78.81    Leukocytosis (leucocytosis) D72.829    Intractable headache R51    UTI (urinary tract infection) N440     60-year-old female who presented with intractable headache x10 days. She is having some improvement with Toradol. She does have an elevated white count and fever with unclear etiology. There is a question of bacteremia and she is on antibiotics. CSF was negative for bacterial meningitis. Viral studies are in progress. Discussed with patient that there is a likely component of rebound headache. Will try steroids for this.      Plan:   1. MRI of the brain negative. 2.  Continue Toradol and will adjust this to every 6 hours to help with pain. Discussed with patient to try to limit her use of it today  3. ID consult for bacteremia appreciated. Reviewed recs  4. LP as above. Viral studies in progress  5. Start prednisone 20mg daily for rebound headache. Discussed with Dr. Estefania Rizvi and will monitor for increase in WBC. If this is not enough may also need to do IV depakote  6. DVT prophylaxis with Lovenox.     Will follow    Signed:  Rosendo Blanco MD  8/15/2018  9:50 AM

## 2018-08-15 NOTE — PROGRESS NOTES
Pt to have OBED prior to starting PO contrast for CT of ABD/PELVIS.  [address] in [address] notified, will call when pt starts her po Contrast.

## 2018-08-15 NOTE — PROGRESS NOTES
Physical therapy note:  Chart reviewed. Patient currently off the floor for procedure. Will follow up with patient for PT evaluation at later time.   Gabby Magana, PT

## 2018-08-15 NOTE — PROGRESS NOTES
[address]  [address] [facility], [address] [place] [address]  [phone]      Medical Progress Note      NAME: Kei Bernardo   :  1950  MRM:  [id number]    Date/Time: 8/15/2018  9:19 AM       Assessment and Plan:   1. Bacteremia/Leukocytosis. not septic. Blood Cx on  and  with alpha-strep. Unclear source. Patient has been on Keflex. Echo is unremarkable for vegetation, but needs OBED to r/o endocarditis. LP is unremarkable for bacterial infection. On ceftriaxone. ID consult appreciated      2. Intractable headache: Hx of migraine in the past. Head CT and MRI of the brain: negative. LP is unremarkable for bacterial infection. Neurology evaluation appreciated. Pain management ( pain better with toradol).              Subjective:     Chief Complaint:  Follow up of pt who was admitted with bacteremia. C/o HA. No nausea or vomiting     ROS:  (bold if positive, if negative)      Tolerating PT  Tolerating Diet        Objective:     Last 24hrs VS reviewed since prior progress note.  Most recent are:    Visit Vitals    /60 (BP 1 Location: Right arm, BP Patient Position: At rest)    Pulse 73    Temp 99.5 °F (37.5 °C)    Resp 17    Ht 5' 8\" (1.727 m)    Wt 97.1 kg (214 lb)    SpO2 95%    BMI 32.54 kg/m2     SpO2 Readings from Last 6 Encounters:   08/15/18 95%   08/10/18 97%   18 94%            Intake/Output Summary (Last 24 hours) at 08/15/18 0841  Last data filed at 18 1839   Gross per 24 hour   Intake              240 ml   Output                0 ml   Net              240 ml        Physical Exam:    Gen:  Well-developed, well-nourished, in no acute distress  HEENT:  Pink conjunctivae, PERRL, hearing intact to voice, moist mucous membranes  Neck:  Supple, without masses, thyroid non-tender  Resp:  No accessory muscle use, clear breath sounds without wheezes rales or rhonchi  Card:  No murmurs, normal S1, S2 without thrills, bruits or peripheral edema  Abd:  Soft, non-tender, non-distended, normoactive bowel sounds are present, no palpable organomegaly and no detectable hernias  Lymph:  No cervical or inguinal adenopathy  Musc:  No cyanosis or clubbing  Skin:  No rashes or ulcers, skin turgor is good  Neuro:  Cranial nerves are grossly intact, no focal motor weakness, follows commands appropriately  Psych:  Good insight, oriented to person, place and time, alert  __________________________________________________________________  Medications Reviewed: (see below)  Medications:     Current Facility-Administered Medications   Medication Dose Route Frequency    ketorolac (TORADOL) injection 15 mg  15 mg IntraVENous Q6H PRN    cefTRIAXone (ROCEPHIN) 2 g in 0.9% sodium chloride (MBP/ADV) 50 mL  2 g IntraVENous Q24H    butalbital-acetaminophen-caffeine (FIORICET, ESGIC) -40 mg per tablet 1 Tab  1 Tab Oral Q4H PRN    0.9% sodium chloride with KCl 20 mEq/L infusion   IntraVENous CONTINUOUS    sodium chloride (NS) flush 5-10 mL  5-10 mL IntraVENous Q8H    sodium chloride (NS) flush 5-10 mL  5-10 mL IntraVENous PRN    HYDROcodone-acetaminophen (NORCO) 5-325 mg per tablet 1 Tab  1 Tab Oral Q4H PRN    HYDROmorphone (PF) (DILAUDID) injection 0.5 mg  0.5 mg IntraVENous Q4H PRN    naloxone (NARCAN) injection 0.4 mg  0.4 mg IntraVENous PRN    diphenhydrAMINE (BENADRYL) injection 12.5 mg  12.5 mg IntraVENous Q4H PRN    ondansetron (ZOFRAN) injection 4 mg  4 mg IntraVENous Q6H PRN    docusate sodium (COLACE) capsule 100 mg  100 mg Oral BID    enoxaparin (LOVENOX) injection 40 mg  40 mg SubCUTAneous Q24H        Lab Data Reviewed: (see below)  Lab Review:     Recent Labs      08/15/18   0340  08/14/18   0326  08/13/18   1242   WBC  13.7*  20.6*  19.1*   HGB  11.5  12.2  14.8   HCT  34.2*  36.2  43.4   PLT  310  321  365     Recent Labs      08/15/18   0340  08/14/18   0326  08/13/18   1544   NA  136  135*  137   K  3.9  3.6  4.0   CL  104  103  102   CO2  25 27  28   GLU  105*  111*  109*   BUN  12  10  12   CREA  0.76  0.82  0.89   CA  8.3*  8.3*  9.1   MG   --   2.0   --    PHOS   --   2.7   --    ALB   --   2.5*  2.9*   TBILI   --   0.6  0.5   SGOT   --   20  20   ALT   --   29  29     No results found for: GLUCPOC  No results for input(s): PH, PCO2, PO2, HCO3, FIO2 in the last 72 hours. No results for input(s): INR in the last 72 hours. No lab exists for component: INREXT, INREXT  All Micro Results     Procedure Component Value Units Date/Time    CULTURE, URINE [[id number]] Collected:  08/13/18 1447    Order Status:  Completed Specimen:  Urine from Clean catch Updated:  08/15/18 0748     Special Requests: NO SPECIAL REQUESTS        [place] Count --        <10,000  COLONIES/mL       Culture result: NO SIGNIFICANT GROWTH       MENINGITIS PATHOGENS PANEL, CSF (BY PCR) [[id number]] Collected:  08/14/18 1700    Order Status:  Completed Specimen:  Cerebrospinal Fluid from Cerebrospinal Fluid Updated:  08/14/18 1904     Escherichia coli K1 NOT DETECTED        Haemophilus Influenzae NOT DETECTED        Listeria Monocytogenes NOT DETECTED        Neisseria Meningitidis NOT DETECTED        Streptococcus Agalactiae NOT DETECTED        Streptococcus Pneumoniae NOT DETECTED        Cytomegalovirus NOT DETECTED        Enterovirus NOT DETECTED        Herpes Simplex Virus 1 NOT DETECTED        Herpes Simplex Virus 2 NOT DETECTED        Human Herpesvirus 6 NOT DETECTED        Human Parechovirus NOT DETECTED        Varicella Zoster Virus NOT DETECTED        Crypto.  neoformans/gattii NOT DETECTED       CULTURE, CSF Brandy Satish [[id number]] Collected:  08/14/18 1312    Order Status:  Completed Specimen:  Cerebrospinal Fluid Updated:  08/14/18 1622     Special Requests: NO SPECIAL REQUESTS        GRAM STAIN RARE WBCS SEEN         NO ORGANISMS SEEN                 Smear Reviewed by Microbiology     Culture result:         Culture performed on Unspun Fluid    CULTURE, BLOOD [[id number]] (Abnormal) Collected:  08/13/18 1544    Order Status:  Completed Specimen:  Blood from Blood Updated:  08/14/18 1314     Special Requests: NO SPECIAL REQUESTS        Culture result:         GRAM POSITIVE COCCI IN CHAINS GROWING IN 1 OF 2 BOTTLES DRAWN SITE = L ARM (A)              REMAINING BOTTLE(S) HAS/HAVE NO GROWTH SO FAR    URINE CULTURE HOLD SAMPLE [[id number]] Collected:  08/13/18 1447    Order Status:  Completed Specimen:  Urine from Serum Updated:  08/13/18 1449     Urine culture hold         URINE ON HOLD IN MICROBIOLOGY DEPT FOR 3 DAYS. IF UNPRESERVED URINE IS SUBMITTED, IT CANNOT BE USED FOR ADDITIONAL TESTING AFTER 24 HRS, RECOLLECTION WILL BE REQUIRED. CULTURE, BLOOD [[id number]]     Order Status:  Sent Specimen:  Blood           I have reviewed notes of prior 24hr. Other pertinent lab:       Total time spent with patient: [place] discussed with: Patient, Nursing Staff and >50% of time spent in counseling and coordination of care    Discussed:  Care Plan    Prophylaxis:  Lovenox    Disposition:  Home w/Family           ___________________________________________________    Attending Physician: Brittany Stone MD

## 2018-08-15 NOTE — PROGRESS NOTES
Occupational therapy note:  Chart reviewed. Patient currently off the floor for procedure. Will follow up with patient for OT evaluation at later time. Lorenza Alcocer MS OTR/L

## 2018-08-15 NOTE — PROGRESS NOTES
1510    TRANSFER - IN REPORT:    Verbal report received from Antonio Thomas (name) on [facility]  being received from [facility] (unit) for routine progression of care      Report consisted of patients Situation, Background, Assessment and   Recommendations(SBAR). Information from the following report(s) Procedure Summary was reviewed with the receiving nurse. Opportunity for questions and clarification was provided. Assessment completed upon patients arrival to unit and care assumed. 3:22 PM    VSS  Patient denies pain  No n/v  Patient remains NPO until 2 hrs post last lidocaine --- 1455  Lights dimmed for photosensitivity patient repositioned for comfort  Monitoring      3:38 PM    VSS  Patient resting comfortably  No complaints  Monitoring  Family not out in waiting area      4:10 PM    Positive GAG reflex  Patient to remain NPO until [phone], patient and RN assuming care verbalize understanding        TRANSFER - OUT REPORT:    Verbal report given to Zandra Lopez RN (name) on [facility]  being transferred to 5th Floor(unit) for routine progression of care       Report consisted of patients Situation, Background, Assessment and   Recommendations(SBAR). Information from the following report(s) SBAR, Procedure Summary and Cardiac Rhythm NSR, PACs was reviewed with the receiving nurse. Lines:   Peripheral IV 08/14/18 Left Hand (Active)   Site Assessment Clean, dry, & intact 8/15/2018  3:20 PM   Phlebitis Assessment 0 8/15/2018  3:20 PM   Infiltration Assessment 0 8/15/2018  3:20 PM   Dressing Status Clean, dry, & intact 8/15/2018  3:20 PM   Dressing Type Transparent 8/15/2018  3:20 PM   Hub Color/Line Status Blue;Patent 8/15/2018  9:30 AM   Action Taken Open ports on tubing capped 8/15/2018  9:30 AM   Alcohol Cap Used Yes 8/15/2018  3:20 PM        Opportunity for questions and clarification was provided.       Patient transported with:   Monitor   RN

## 2018-08-15 NOTE — PROCEDURES
BRIEF PROCEDURE NOTE    Date of Procedure: 8/15/2018   Preoperative Diagnosis: Bacteremia  Postoperative Diagnosis: No echo evidence of endocarditis  Procedure:OBED  Cardiologist: Cristiano Alvarado MD  Anesthesia: local (benzocaine spray, Viscous lidocaine gargle) + IV sedation (Midazolam 2 mg, Fentanyl 50  mcg)  Estimated Blood Loss: Minimal    Informed consent obtained prior to procedure. Appropriate time out performed. Findings:     MV: Nml/Mild MR  TV: Nml/Trivial TR  AV: Trileaflet/Trivial AR  PV: Nml    LA:  KURT - no thrombus  RA: Nml  RV: Nml  LV: Nml  LVEF: 55%  Atrial septum - small PFO /with doppler and saline contrast  Aorta : nml size; Minimal     Atheroma    Complications:  None, no oropharyngeal trauma, VSS at end of procedure.

## 2018-08-15 NOTE — PROGRESS NOTES
Pt. Received back to room post OBED, no s/s of endocarditis. Pt stable, drowsy but awakens to verbal stimuli. NPO until 5pm R/T lidocaine oral spray. Cont to monitor pt for any acute changes and notify MD of any acute changes.

## 2018-08-15 NOTE — PROGRESS NOTES
Bedside and Verbal shift change report given to Lomeli Jaleel RN (oncoming nurse) by Mikel Olson RN (offgoing nurse). Report included the following information SBAR, Kardex, Intake/Output, MAR, Recent Results and Med Rec Status.

## 2018-08-15 NOTE — ROUTINE PROCESS
Bedside and Verbal shift change report given to sissy fung rn (oncoming nurse) by kevin glover rn (offgoing nurse). Report included the following information SBAR and MAR.

## 2018-08-15 NOTE — PROGRESS NOTES
ID  Full consult note to follow  Headache fever/ does not look septic  Alpha strep bacteremia- unclear source  Possible oral cavity  Need to r/o endocarditis- OBED needed  Lp findings not bacterial meningitis- could be due to NSAID  Change zosyn to ceftriaxone

## 2018-08-16 ENCOUNTER — APPOINTMENT (OUTPATIENT)
Dept: CT IMAGING | Age: 68
DRG: 872 | End: 2018-08-16
Attending: INTERNAL MEDICINE
Payer: COMMERCIAL

## 2018-08-16 LAB
ANION GAP SERPL CALC-SCNC: 7 MMOL/L (ref 5–15)
BACTERIA SPEC CULT: ABNORMAL
BASOPHILS # BLD: 0 K/UL (ref 0–0.1)
BASOPHILS NFR BLD: 0 % (ref 0–1)
BUN SERPL-MCNC: 12 MG/DL (ref 6–20)
BUN/CREAT SERPL: 15 (ref 12–20)
CALCIUM SERPL-MCNC: 8.6 MG/DL (ref 8.5–10.1)
CHLORIDE SERPL-SCNC: 107 MMOL/L (ref 97–108)
CO2 SERPL-SCNC: 23 MMOL/L (ref 21–32)
CREAT SERPL-MCNC: 0.78 MG/DL (ref 0.55–1.02)
DIFFERENTIAL METHOD BLD: ABNORMAL
EOSINOPHIL # BLD: 0.1 K/UL (ref 0–0.4)
EOSINOPHIL NFR BLD: 1 % (ref 0–7)
ERYTHROCYTE [DISTWIDTH] IN BLOOD BY AUTOMATED COUNT: 12.9 % (ref 11.5–14.5)
GLUCOSE SERPL-MCNC: 98 MG/DL (ref 65–100)
HCT VFR BLD AUTO: 32.8 % (ref 35–47)
HGB BLD-MCNC: 10.9 G/DL (ref 11.5–16)
IMM GRANULOCYTES # BLD: 0.1 K/UL (ref 0–0.04)
IMM GRANULOCYTES NFR BLD AUTO: 1 % (ref 0–0.5)
LYMPHOCYTES # BLD: 2.4 K/UL (ref 0.8–3.5)
LYMPHOCYTES NFR BLD: 17 % (ref 12–49)
MCH RBC QN AUTO: 30.6 PG (ref 26–34)
MCHC RBC AUTO-ENTMCNC: 33.2 G/DL (ref 30–36.5)
MCV RBC AUTO: 92.1 FL (ref 80–99)
MONOCYTES # BLD: 1.1 K/UL (ref 0–1)
MONOCYTES NFR BLD: 8 % (ref 5–13)
NEUTS SEG # BLD: 10.3 K/UL (ref 1.8–8)
NEUTS SEG NFR BLD: 74 % (ref 32–75)
NRBC # BLD: 0 K/UL (ref 0–0.01)
NRBC BLD-RTO: 0 PER 100 WBC
PLATELET # BLD AUTO: 297 K/UL (ref 150–400)
PMV BLD AUTO: 10.5 FL (ref 8.9–12.9)
POTASSIUM SERPL-SCNC: 3.9 MMOL/L (ref 3.5–5.1)
RBC # BLD AUTO: 3.56 M/UL (ref 3.8–5.2)
SERVICE CMNT-IMP: ABNORMAL
SODIUM SERPL-SCNC: 137 MMOL/L (ref 136–145)
WBC # BLD AUTO: 14 K/UL (ref 3.6–11)
WNV IGG SPEC QL IA: NEGATIVE
WNV IGM CSF QL IA: NEGATIVE

## 2018-08-16 PROCEDURE — 74011250636 HC RX REV CODE- 250/636: Performed by: INTERNAL MEDICINE

## 2018-08-16 PROCEDURE — 74011636320 HC RX REV CODE- 636/320: Performed by: RADIOLOGY

## 2018-08-16 PROCEDURE — 74011636637 HC RX REV CODE- 636/637: Performed by: PSYCHIATRY & NEUROLOGY

## 2018-08-16 PROCEDURE — 80048 BASIC METABOLIC PNL TOTAL CA: CPT | Performed by: INTERNAL MEDICINE

## 2018-08-16 PROCEDURE — 74177 CT ABD & PELVIS W/CONTRAST: CPT

## 2018-08-16 PROCEDURE — 36415 COLL VENOUS BLD VENIPUNCTURE: CPT | Performed by: INTERNAL MEDICINE

## 2018-08-16 PROCEDURE — 74011000250 HC RX REV CODE- 250: Performed by: INTERNAL MEDICINE

## 2018-08-16 PROCEDURE — 94760 N-INVAS EAR/PLS OXIMETRY 1: CPT

## 2018-08-16 PROCEDURE — 85025 COMPLETE CBC W/AUTO DIFF WBC: CPT | Performed by: INTERNAL MEDICINE

## 2018-08-16 PROCEDURE — 74011250637 HC RX REV CODE- 250/637: Performed by: INTERNAL MEDICINE

## 2018-08-16 PROCEDURE — 65270000029 HC RM PRIVATE

## 2018-08-16 PROCEDURE — 74011000258 HC RX REV CODE- 258: Performed by: INTERNAL MEDICINE

## 2018-08-16 PROCEDURE — 87040 BLOOD CULTURE FOR BACTERIA: CPT | Performed by: INTERNAL MEDICINE

## 2018-08-16 RX ADMIN — DOCUSATE SODIUM 100 MG: 100 CAPSULE, LIQUID FILLED ORAL at 10:48

## 2018-08-16 RX ADMIN — HYDROCODONE BITARTRATE AND ACETAMINOPHEN 1 TABLET: 5; 325 TABLET ORAL at 10:47

## 2018-08-16 RX ADMIN — ONDANSETRON 4 MG: 2 INJECTION INTRAMUSCULAR; INTRAVENOUS at 07:15

## 2018-08-16 RX ADMIN — POLYETHYLENE GLYCOL 3350, SODIUM SULFATE ANHYDROUS, SODIUM BICARBONATE, SODIUM CHLORIDE, POTASSIUM CHLORIDE 4000 ML: 236; 22.74; 6.74; 5.86; 2.97 POWDER, FOR SOLUTION ORAL at 16:00

## 2018-08-16 RX ADMIN — KETOROLAC TROMETHAMINE 15 MG: 30 INJECTION, SOLUTION INTRAMUSCULAR at 06:01

## 2018-08-16 RX ADMIN — SODIUM CHLORIDE AND POTASSIUM CHLORIDE: 9; 1.49 INJECTION, SOLUTION INTRAVENOUS at 02:26

## 2018-08-16 RX ADMIN — CEFTRIAXONE SODIUM 2 G: 2 INJECTION, POWDER, FOR SOLUTION INTRAMUSCULAR; INTRAVENOUS at 20:58

## 2018-08-16 RX ADMIN — HYDROMORPHONE HYDROCHLORIDE 0.5 MG: 2 INJECTION, SOLUTION INTRAMUSCULAR; INTRAVENOUS; SUBCUTANEOUS at 20:58

## 2018-08-16 RX ADMIN — DOCUSATE SODIUM 100 MG: 100 CAPSULE, LIQUID FILLED ORAL at 17:15

## 2018-08-16 RX ADMIN — PREDNISONE 20 MG: 20 TABLET ORAL at 10:47

## 2018-08-16 RX ADMIN — Medication 10 ML: at 21:02

## 2018-08-16 RX ADMIN — ENOXAPARIN SODIUM 40 MG: 40 INJECTION SUBCUTANEOUS at 20:58

## 2018-08-16 RX ADMIN — HYDROMORPHONE HYDROCHLORIDE 0.5 MG: 2 INJECTION, SOLUTION INTRAMUSCULAR; INTRAVENOUS; SUBCUTANEOUS at 03:34

## 2018-08-16 RX ADMIN — Medication 10 ML: at 06:01

## 2018-08-16 RX ADMIN — KETOROLAC TROMETHAMINE 15 MG: 30 INJECTION, SOLUTION INTRAMUSCULAR at 00:03

## 2018-08-16 RX ADMIN — IOPAMIDOL 99 ML: 755 INJECTION, SOLUTION INTRAVENOUS at 10:19

## 2018-08-16 RX ADMIN — Medication 10 ML: at 14:00

## 2018-08-16 RX ADMIN — IOHEXOL 50 ML: 350 INJECTION, SOLUTION INTRAVENOUS at 07:45

## 2018-08-16 NOTE — PROGRESS NOTES
Neurology Progress Note    Patient ID:  Harshad Perry  [id number]  76 y.o.  1950    Chief Complaint: I threw up for no reason    Subjective:   Pt had some vomiting this AM. Despite this she feels like her headache is better with the prednisone. Objective: All records in [facility] reviewed and noted    ROS:  Per HPI  All other 12 pt ROS negative    Meds:  Current Facility-Administered Medications   Medication Dose Route Frequency    iohexol (OMNIPAQUE) 350 mg iodine/mL contrast injection 50 mL  50 mL Oral RAD ONCE    diatrizoate kun-diatrizoat sod (MD-GASTROVIEW,GASTROGRAFIN) 66-10 % contrast solution 30 mL  30 mL Oral RAD ONCE    predniSONE (DELTASONE) tablet 20 mg  20 mg Oral DAILY WITH BREAKFAST    ketorolac (TORADOL) injection 15 mg  15 mg IntraVENous Q6H PRN    cefTRIAXone (ROCEPHIN) 2 g in 0.9% sodium chloride (MBP/ADV) 50 mL  2 g IntraVENous Q24H    butalbital-acetaminophen-caffeine (FIORICET, ESGIC) -40 mg per tablet 1 Tab  1 Tab Oral Q4H PRN    0.9% sodium chloride with KCl 20 mEq/L infusion   IntraVENous CONTINUOUS    sodium chloride (NS) flush 5-10 mL  5-10 mL IntraVENous Q8H    sodium chloride (NS) flush 5-10 mL  5-10 mL IntraVENous PRN    HYDROcodone-acetaminophen (NORCO) 5-325 mg per tablet 1 Tab  1 Tab Oral Q4H PRN    HYDROmorphone (PF) (DILAUDID) injection 0.5 mg  0.5 mg IntraVENous Q4H PRN    naloxone (NARCAN) injection 0.4 mg  0.4 mg IntraVENous PRN    diphenhydrAMINE (BENADRYL) injection 12.5 mg  12.5 mg IntraVENous Q4H PRN    ondansetron (ZOFRAN) injection 4 mg  4 mg IntraVENous Q6H PRN    docusate sodium (COLACE) capsule 100 mg  100 mg Oral BID    enoxaparin (LOVENOX) injection 40 mg  40 mg SubCUTAneous Q24H       Imaging:  MRI brain: neg  LP below with relatively benign picture except some WBC which is likely due to NSAID use.    OBED with small PFO    Lab Review   Recent Results (from the past 24 hour(s))   CBC WITH AUTOMATED DIFF    Collection Time: 08/16/18  2:54 AM   Result Value Ref Range    WBC 14.0 (H) 3.6 - 11.0 K/uL    RBC 3.56 (L) 3.80 - 5.20 M/uL    HGB 10.9 (L) 11.5 - 16.0 g/dL    HCT 32.8 (L) 35.0 - 47.0 %    MCV 92.1 80.0 - 99.0 FL    MCH 30.6 26.0 - 34.0 PG    MCHC 33.2 30.0 - 36.5 g/dL    RDW 12.9 11.5 - 14.5 %    PLATELET 613 001 - 758 K/uL    MPV 10.5 8.9 - 12.9 FL    NRBC 0.0 0  WBC    ABSOLUTE NRBC 0.00 0.00 - 0.01 K/uL    NEUTROPHILS 74 32 - 75 %    LYMPHOCYTES 17 12 - 49 %    MONOCYTES 8 5 - 13 %    EOSINOPHILS 1 0 - 7 %    BASOPHILS 0 0 - 1 %    IMMATURE GRANULOCYTES 1 (H) 0.0 - 0.5 %    ABS. NEUTROPHILS 10.3 (H) 1.8 - 8.0 K/UL    ABS. LYMPHOCYTES 2.4 0.8 - 3.5 K/UL    ABS. MONOCYTES 1.1 (H) 0.0 - 1.0 K/UL    ABS. EOSINOPHILS 0.1 0.0 - 0.4 K/UL    ABS. BASOPHILS 0.0 0.0 - 0.1 K/UL    ABS. IMM. GRANS. 0.1 (H) 0.00 - 0.04 K/UL    DF AUTOMATED     METABOLIC PANEL, BASIC    Collection Time: 08/16/18  2:54 AM   Result Value Ref Range    Sodium 137 136 - 145 mmol/L    Potassium 3.9 3.5 - 5.1 mmol/L    Chloride 107 97 - 108 mmol/L    CO2 23 21 - 32 mmol/L    Anion gap 7 5 - 15 mmol/L    Glucose 98 65 - 100 mg/dL    BUN 12 6 - 20 MG/DL    Creatinine 0.78 0.55 - 1.02 MG/DL    BUN/Creatinine ratio 15 12 - 20      GFR est AA >60 >60 ml/min/1.73m2    GFR est non-AA >60 >60 ml/min/1.73m2    Calcium 8.6 8.5 - 10.1 MG/DL       Exam:  Visit Vitals    /72 (BP 1 Location: Right arm, BP Patient Position: At rest)    Pulse 63    Temp 98.1 °F (36.7 °C)    Resp 18    Ht 5' 8\" (1.727 m)    Wt 97.1 kg (214 lb)    SpO2 94%    BMI 32.54 kg/m2     Gen:  Well developed  CV: RRR  Lungs: non labored breathing  Abd: non distending  Neuro: A&O x 3, no dysarthria or aphasia  CN II-XII: PERRL, EOMI, face symmetric, tongue/palate midline  Motor: strength 5/5 all four ext  Sensory: intact to LT  Gait: normal    Assessment:     Patient Active Problem List   Diagnosis Code    Bacteremia R78.81    Leukocytosis (leucocytosis) D72.829    Intractable headache R51    UTI (urinary tract infection) N440     59-year-old female who presented with intractable headache x10 days. She is having some improvement with Toradol. She does have an elevated white count and fever with unclear etiology. There is a question of bacteremia and she is on antibiotics. CSF was negative for bacterial meningitis. Viral studies are in progress. HSV neg. Headache improving. Continue prednisone for 5 total doses.     Plan:   1. MRI of the brain negative. 2.  Continue Toradol and will adjust this to every 6 hours to help with pain. Discussed with patient to try to limit her use of it   3. ID consult for bacteremia appreciated. Reviewed recs  4. LP as above. Viral studies in progress. HSV neg  5. Cont prednisone 20mg daily for rebound headache. (2 of 5 today)  6. DVT prophylaxis with Lovenox. 7. Noted small PFO on OBED. No treatment recs for this from neuro standpoint. Pt improving in terms of headache so will sign off but please call back with further questions. Some CSF viral studies are pending but ID is following this.      Signed:  Letty Mckeon MD  8/16/2018  9:50 AM

## 2018-08-16 NOTE — PROGRESS NOTES
Physical Therapy  8/16/2018    Chart reviewed and cleared by RN. Upon entering room pt supine in bed with moderately severe HA. Staff member attempting to draw blood but pt pleasant and willing to talk. Per pt, she has been up ad hong within room. Unplugging IV from the wall and moving about without concerns. Pt was INDEP prior to admission, working full time and driving. Reports no falls or feelings of weakness. Will sign off and complete orders at this time as pt addresses no therapy needs. Please re-consult if a specific need arises.     Thank Irina Palomino, PT, DPT

## 2018-08-16 NOTE — CONSULTS
Gastroenterology Consult     Referring Physician: Caroline Siegel    Consult Date: 8/16/2018     Subjective: Feeling much better     Chief Complaint: headache    History of Present Illness: Sabrina Todd is a 76 y.o. female who is seen in consultation for bacteremia, abnormal CT. No intestinal symptoms. Had fever, intractable headache now shown associated with alpha strep bacteremia; primary point origin not determined. CT has shown diverticulosis without abscess, colonic diverticulitis or mass lesion. With therapy so far feels more than 50% improved from time of admit. No past medical history on file.   Past Surgical History:   Procedure Laterality Date    HX GYN      Hysterectomy      Family History   Problem Relation Age of Onset    Diabetes Mother     Heart Attack Brother      Social History   Substance Use Topics    Smoking status: Never Smoker    Smokeless tobacco: Never Used    Alcohol use No      Allergies   Allergen Reactions    Morphine Rash     Current Facility-Administered Medications   Medication Dose Route Frequency    diatrizoate kun-diatrizoat sod (MD-GASTROVIEW,GASTROGRAFIN) 66-10 % contrast solution 30 mL  30 mL Oral ONCE    peg 3350-electrolytes (COLYTE) 4000 mL  4,000 mL Oral ONCE    predniSONE (DELTASONE) tablet 20 mg  20 mg Oral DAILY WITH BREAKFAST    cefTRIAXone (ROCEPHIN) 2 g in 0.9% sodium chloride (MBP/ADV) 50 mL  2 g IntraVENous Q24H    butalbital-acetaminophen-caffeine (FIORICET, ESGIC) -40 mg per tablet 1 Tab  1 Tab Oral Q4H PRN    0.9% sodium chloride with KCl 20 mEq/L infusion   IntraVENous CONTINUOUS    sodium chloride (NS) flush 5-10 mL  5-10 mL IntraVENous Q8H    sodium chloride (NS) flush 5-10 mL  5-10 mL IntraVENous PRN    HYDROcodone-acetaminophen (NORCO) 5-325 mg per tablet 1 Tab  1 Tab Oral Q4H PRN    HYDROmorphone (PF) (DILAUDID) injection 0.5 mg  0.5 mg IntraVENous Q4H PRN    naloxone (NARCAN) injection 0.4 mg  0.4 mg IntraVENous PRN    diphenhydrAMINE (BENADRYL) injection 12.5 mg  12.5 mg IntraVENous Q4H PRN    ondansetron (ZOFRAN) injection 4 mg  4 mg IntraVENous Q6H PRN    docusate sodium (COLACE) capsule 100 mg  100 mg Oral BID    enoxaparin (LOVENOX) injection 40 mg  40 mg SubCUTAneous Q24H        Review of Systems:  A detailed 10 organ review of systems is obtained with pertinent positives as listed in the History of Present Illness and Past Medical History. All others are negative. Objective:     Physical Exam:  Visit Vitals    /86 (BP 1 Location: Right arm, BP Patient Position: At rest)    Pulse 70    Temp 98.4 °F (36.9 °C)    Resp 18    Ht 5' 8\" (1.727 m)    Wt 97.1 kg (214 lb)    SpO2 97%    BMI 32.54 kg/m2        Skin:  Extremities and face reveal no rashes. No hayden erythema. No telangiectasias on the chest wall. HEENT: Sclerae anicteric. Extra-occular muscles are intact. No oral ulcers. No abnormal pigmentation of the lips. Cardiovascular: Regular rate and rhythm. No murmurs, gallops, or rubs. Respiratory:  Comfortable breathing with no accessory muscle use. Clear breath sounds with no wheezes, rales, or rhonchi. GI:  Abdomen nondistended, soft, and nontender. Normal active bowel sounds. No enlargement of the liver or spleen. No masses palpable. Musculoskeletal:  No pitting edema of the lower legs. Extremities have good range of motion. Neurological:  Gross memory appears intact. Patient is alert and oriented. Psychiatric:  Mood appears appropriate with judgement intact. Lymphatic:  No cervical or supraclavicular adenopathy.     Lab/Data Review:  CMP:   Lab Results   Component Value Date/Time     08/16/2018 02:54 AM    K 3.9 08/16/2018 02:54 AM     08/16/2018 02:54 AM    CO2 23 08/16/2018 02:54 AM    AGAP 7 08/16/2018 02:54 AM    GLU 98 08/16/2018 02:54 AM    BUN 12 08/16/2018 02:54 AM    CREA 0.78 08/16/2018 02:54 AM    GFRAA >60 08/16/2018 02:54 AM    GFRNA >60 08/16/2018 02:54 AM    CA 8.6 08/16/2018 02:54 AM     CBC:   Lab Results   Component Value Date/Time    WBC 14.0 (H) 08/16/2018 02:54 AM    HGB 10.9 (L) 08/16/2018 02:54 AM    HCT 32.8 (L) 08/16/2018 02:54 AM     08/16/2018 02:54 AM     CT:LUNG BASES: Trace bilateral pleural effusions with adjacent compressive  atelectasis. No focal consolidation. INCIDENTALLY IMAGED HEART AND MEDIASTINUM: Borderline cardiomegaly. No  pericardial effusion. LIVER: Normal contour. Slight hypoattenuation compared to the spleen raises  suspicion for hepatic steatosis. No focal hepatic lesion. GALLBLADDER: Unremarkable. SPLEEN: No mass. PANCREAS: No mass or ductal dilatation. ADRENALS: Unremarkable. KIDNEYS: Symmetric enhancement. No hydronephrosis. Parapelvic cyst of the right  upper pole. STOMACH: Unremarkable. SMALL BOWEL: No dilatation or wall thickening. COLON: Sigmoid diverticulosis without evidence of active inflammation. APPENDIX: Unremarkable. PERITONEUM: No ascites or pneumoperitoneum. RETROPERITONEUM: No lymphadenopathy or aortic aneurysm. REPRODUCTIVE ORGANS: Surgically absent uterus. No adnexal masses. No pelvic free  fluid or lymphadenopathy. URINARY BLADDER: No mass or calculus      Assessment/Plan:     Principal Problem:    Bacteremia (8/13/2018)    Active Problems:    Leukocytosis (leucocytosis) (8/13/2018)      Intractable headache (8/13/2018)      UTI (urinary tract infection) (8/13/2018)     Anemia    Recommend:    I've discussed colonoscopy possible biopsy, polypectomy, cautery, injection, alternatives, complications including but not limited to pain, cardiopulmonary event, bleeding, perforation requiring additional blood transfusion or operative repair; all questions answered.

## 2018-08-16 NOTE — PROGRESS NOTES
0750  Bedside and Verbal shift change report given to [employer] (oncoming nurse) by Grecia Cheung (offgoing nurse). Report included the following information SBAR, Kardex, Intake/Output, MAR, Recent Results and Med Rec Status. 2100  Received call from CT. Technician stated that they have patient on schedule for 8 am and pt should be NPO for 4 hours prior. Will notify patient.

## 2018-08-16 NOTE — ROUTINE PROCESS
Bedside and Verbal shift change report given to Andrew Patel rn (oncoming nurse) by Jennifer Avila (offgoing nurse). Report included the following information SBAR, Kardex, Procedure Summary, Intake/Output, MAR and Recent Results.

## 2018-08-16 NOTE — PROGRESS NOTES
Occupational therapy note:  Chart reviewed. Patient has been up ad hong in room, managing ADLs and transfers with IV pole. Patient reports independent with ADL tasks and was working/driving prior to hospitalization. Will sign off as patient does not require OT services. Lorenza Alcocer MS OTR/L

## 2018-08-16 NOTE — PROGRESS NOTES
[facility] [address]  [address] [facility], [address] [place] [address]  [phone]      Medical Progress Note      NAME: Ailyn Lee   :  1950  MRM:  [id number]    Date/Time: 2018  9:19 AM       Assessment and Plan:   1. Bacteremia/Leukocytosis. not septic. Blood Cx on  and  with alpha-strep. Unclear source. Patient has been on Keflex. Echo is unremarkable for vegetation. OBED: no endocarditis. LP is unremarkable for bacterial infection. On ceftriaxone. ID consult appreciated. Check CT scan of the abdomen and pelvis. repeat BC       2. Intractable headache: Hx of migraine in the past. Head CT and MRI of the brain: negative. LP is unremarkable for bacterial infection. Neurology evaluation appreciated. Pain management. On steroid.              Subjective:     Chief Complaint:  Follow up of pt who was admitted with bacteremia. C/o vomiting. No nausea or vomiting     ROS:  (bold if positive, if negative)      Tolerating PT  Tolerating Diet        Objective:     Last 24hrs VS reviewed since prior progress note.  Most recent are:    Visit Vitals    /72 (BP 1 Location: Right arm, BP Patient Position: At rest)    Pulse 63    Temp 98.1 °F (36.7 °C)    Resp 18    Ht 5' 8\" (1.727 m)    Wt 97.1 kg (214 lb)    SpO2 94%    BMI 32.54 kg/m2     SpO2 Readings from Last 6 Encounters:   18 94%   08/10/18 97%   18 94%          No intake or output data in the 24 hours ending 18 0938     Physical Exam:    Gen:  Well-developed, well-nourished, in no acute distress  HEENT:  Pink conjunctivae, PERRL, hearing intact to voice, moist mucous membranes  Neck:  Supple, without masses, thyroid non-tender  Resp:  No accessory muscle use, clear breath sounds without wheezes rales or rhonchi  Card:  No murmurs, normal S1, S2 without thrills, bruits or peripheral edema  Abd:  Soft, non-tender, non-distended, normoactive bowel sounds are present, no palpable organomegaly and no detectable hernias  Lymph:  No cervical or inguinal adenopathy  Musc:  No cyanosis or clubbing  Skin:  No rashes or ulcers, skin turgor is good  Neuro:  Cranial nerves are grossly intact, no focal motor weakness, follows commands appropriately  Psych:  Good insight, oriented to person, place and time, alert  __________________________________________________________________  Medications Reviewed: (see below)  Medications:     Current Facility-Administered Medications   Medication Dose Route Frequency    iohexol (OMNIPAQUE) 350 mg iodine/mL contrast injection 50 mL  50 mL Oral RAD ONCE    diatrizoate kun-diatrizoat sod (MD-GASTROVIEW,GASTROGRAFIN) 66-10 % contrast solution 30 mL  30 mL Oral RAD ONCE    predniSONE (DELTASONE) tablet 20 mg  20 mg Oral DAILY WITH BREAKFAST    ketorolac (TORADOL) injection 15 mg  15 mg IntraVENous Q6H PRN    cefTRIAXone (ROCEPHIN) 2 g in 0.9% sodium chloride (MBP/ADV) 50 mL  2 g IntraVENous Q24H    butalbital-acetaminophen-caffeine (FIORICET, ESGIC) -40 mg per tablet 1 Tab  1 Tab Oral Q4H PRN    0.9% sodium chloride with KCl 20 mEq/L infusion   IntraVENous CONTINUOUS    sodium chloride (NS) flush 5-10 mL  5-10 mL IntraVENous Q8H    sodium chloride (NS) flush 5-10 mL  5-10 mL IntraVENous PRN    HYDROcodone-acetaminophen (NORCO) 5-325 mg per tablet 1 Tab  1 Tab Oral Q4H PRN    HYDROmorphone (PF) (DILAUDID) injection 0.5 mg  0.5 mg IntraVENous Q4H PRN    naloxone (NARCAN) injection 0.4 mg  0.4 mg IntraVENous PRN    diphenhydrAMINE (BENADRYL) injection 12.5 mg  12.5 mg IntraVENous Q4H PRN    ondansetron (ZOFRAN) injection 4 mg  4 mg IntraVENous Q6H PRN    docusate sodium (COLACE) capsule 100 mg  100 mg Oral BID    enoxaparin (LOVENOX) injection 40 mg  40 mg SubCUTAneous Q24H        Lab Data Reviewed: (see below)  Lab Review:     Recent Labs      08/16/18   0254  08/15/18   0340  08/14/18   0326   WBC  14.0*  13.7*  20.6*   HGB  10.9*  11.5  12.2   HCT  32.8*  34.2* 36.2   PLT  297  310  321     Recent Labs      08/16/18   0254  08/15/18   0340  08/14/18   0326  08/13/18   1544   NA  137  136  135*  137   K  3.9  3.9  3.6  4.0   CL  107  104  103  102   CO2  23  25  27  28   GLU  98  105*  111*  109*   BUN  12  12  10  12   CREA  0.78  0.76  0.82  0.89   CA  8.6  8.3*  8.3*  9.1   MG   --    --   2.0   --    PHOS   --    --   2.7   --    ALB   --    --   2.5*  2.9*   TBILI   --    --   0.6  0.5   SGOT   --    --   20  20   ALT   --    --   29  29     No results found for: GLUCPOC  No results for input(s): PH, PCO2, PO2, HCO3, FIO2 in the last 72 hours. No results for input(s): INR in the last 72 hours. No lab exists for component: INREXT, INREXT  All Micro Results     Procedure Component Value Units Date/Time    CULTURE, BLOOD [[id number]]  (Abnormal)  (Susceptibility) Collected:  08/13/18 1544    Order Status:  Completed Specimen:  Blood from Blood Updated:  08/16/18 0927     Special Requests: NO SPECIAL REQUESTS        Culture result:         ALPHA STREPTOCOCCUS, NOT S.  PNEUMONIAE GROWING IN 1 OF 2 BOTTLES DRAWN SITE = L ARM IDENTIFICATION TO FOLLOW (A)              REMAINING BOTTLE(S) HAS/HAVE NO GROWTH SO FAR    CULTURE, CSF Rogermon Shawn [[id number]] Collected:  08/14/18 1312    Order Status:  Completed Specimen:  Cerebrospinal Fluid Updated:  08/15/18 1302     Special Requests: NO SPECIAL REQUESTS        GRAM STAIN RARE WBCS SEEN         NO ORGANISMS SEEN                 Smear Reviewed by Microbiology     Culture result:         Culture performed on Unspun Fluid      NO GROWTH AFTER 20 HOURS       CULTURE, URINE [[id number]] Collected:  08/13/18 1447    Order Status:  Completed Specimen:  Urine from Clean catch Updated:  08/15/18 0748     Special Requests: NO SPECIAL REQUESTS        [place] Count --        <10,000  COLONIES/mL       Culture result: NO SIGNIFICANT GROWTH       MENINGITIS PATHOGENS PANEL, CSF (BY PCR) [[id number]] Collected:  08/14/18 1700    Order Status: Completed Specimen:  Cerebrospinal Fluid from Cerebrospinal Fluid Updated:  08/14/18 1904     Escherichia coli K1 NOT DETECTED        Haemophilus Influenzae NOT DETECTED        Listeria Monocytogenes NOT DETECTED        Neisseria Meningitidis NOT DETECTED        Streptococcus Agalactiae NOT DETECTED        Streptococcus Pneumoniae NOT DETECTED        Cytomegalovirus NOT DETECTED        Enterovirus NOT DETECTED        Herpes Simplex Virus 1 NOT DETECTED        Herpes Simplex Virus 2 NOT DETECTED        Human Herpesvirus 6 NOT DETECTED        Human Parechovirus NOT DETECTED        Varicella Zoster Virus NOT DETECTED        Crypto. neoformans/gattii NOT DETECTED       URINE CULTURE HOLD SAMPLE [[id number]] Collected:  08/13/18 1447    Order Status:  Completed Specimen:  Urine from Serum Updated:  08/13/18 1449     Urine culture hold         URINE ON HOLD IN MICROBIOLOGY DEPT FOR 3 DAYS. IF UNPRESERVED URINE IS SUBMITTED, IT CANNOT BE USED FOR ADDITIONAL TESTING AFTER 24 HRS, RECOLLECTION WILL BE REQUIRED. CULTURE, BLOOD [[id number]] Collected:  08/13/18 1415    Order Status:  Canceled Specimen:  Blood           I have reviewed notes of prior 24hr. Other pertinent lab:       Total time spent with patient: [place] discussed with: Patient, Nursing Staff and >50% of time spent in counseling and coordination of care    Discussed:  Care Plan    Prophylaxis:  Lovenox    Disposition:  Home w/Family           ___________________________________________________    Attending Physician: Garima Batres MD

## 2018-08-17 ENCOUNTER — ANESTHESIA EVENT (OUTPATIENT)
Dept: ENDOSCOPY | Age: 68
DRG: 872 | End: 2018-08-17
Payer: COMMERCIAL

## 2018-08-17 ENCOUNTER — APPOINTMENT (OUTPATIENT)
Dept: CT IMAGING | Age: 68
DRG: 872 | End: 2018-08-17
Attending: INTERNAL MEDICINE
Payer: COMMERCIAL

## 2018-08-17 ENCOUNTER — ANESTHESIA (OUTPATIENT)
Dept: ENDOSCOPY | Age: 68
DRG: 872 | End: 2018-08-17
Payer: COMMERCIAL

## 2018-08-17 LAB
ANION GAP SERPL CALC-SCNC: 10 MMOL/L (ref 5–15)
BASOPHILS # BLD: 0 K/UL (ref 0–0.1)
BASOPHILS NFR BLD: 0 % (ref 0–1)
BUN SERPL-MCNC: 7 MG/DL (ref 6–20)
BUN/CREAT SERPL: 9 (ref 12–20)
CALCIUM SERPL-MCNC: 9.1 MG/DL (ref 8.5–10.1)
CHLORIDE SERPL-SCNC: 104 MMOL/L (ref 97–108)
CO2 SERPL-SCNC: 24 MMOL/L (ref 21–32)
CREAT SERPL-MCNC: 0.76 MG/DL (ref 0.55–1.02)
DIFFERENTIAL METHOD BLD: ABNORMAL
EBV DNA SPEC QL NAA+PROBE: NEGATIVE
EOSINOPHIL # BLD: 0.1 K/UL (ref 0–0.4)
EOSINOPHIL NFR BLD: 0 % (ref 0–7)
ERYTHROCYTE [DISTWIDTH] IN BLOOD BY AUTOMATED COUNT: 12.8 % (ref 11.5–14.5)
GLUCOSE SERPL-MCNC: 108 MG/DL (ref 65–100)
HCT VFR BLD AUTO: 36.8 % (ref 35–47)
HGB BLD-MCNC: 12.4 G/DL (ref 11.5–16)
IMM GRANULOCYTES # BLD: 0.2 K/UL (ref 0–0.04)
IMM GRANULOCYTES NFR BLD AUTO: 1 % (ref 0–0.5)
LYMPHOCYTES # BLD: 2.8 K/UL (ref 0.8–3.5)
LYMPHOCYTES NFR BLD: 14 % (ref 12–49)
MCH RBC QN AUTO: 30.7 PG (ref 26–34)
MCHC RBC AUTO-ENTMCNC: 33.7 G/DL (ref 30–36.5)
MCV RBC AUTO: 91.1 FL (ref 80–99)
MONOCYTES # BLD: 1.3 K/UL (ref 0–1)
MONOCYTES NFR BLD: 6 % (ref 5–13)
NEUTS SEG # BLD: 15.9 K/UL (ref 1.8–8)
NEUTS SEG NFR BLD: 79 % (ref 32–75)
NRBC # BLD: 0 K/UL (ref 0–0.01)
NRBC BLD-RTO: 0 PER 100 WBC
PLATELET # BLD AUTO: 373 K/UL (ref 150–400)
PMV BLD AUTO: 10.7 FL (ref 8.9–12.9)
POTASSIUM SERPL-SCNC: 3.7 MMOL/L (ref 3.5–5.1)
RBC # BLD AUTO: 4.04 M/UL (ref 3.8–5.2)
SODIUM SERPL-SCNC: 138 MMOL/L (ref 136–145)
SPECIMEN SOURCE: NORMAL
WBC # BLD AUTO: 20.3 K/UL (ref 3.6–11)

## 2018-08-17 PROCEDURE — 74011636637 HC RX REV CODE- 636/637: Performed by: PSYCHIATRY & NEUROLOGY

## 2018-08-17 PROCEDURE — 88305 TISSUE EXAM BY PATHOLOGIST: CPT | Performed by: INTERNAL MEDICINE

## 2018-08-17 PROCEDURE — 0DBL8ZX EXCISION OF TRANSVERSE COLON, VIA NATURAL OR ARTIFICIAL OPENING ENDOSCOPIC, DIAGNOSTIC: ICD-10-PCS | Performed by: INTERNAL MEDICINE

## 2018-08-17 PROCEDURE — 74011250636 HC RX REV CODE- 250/636

## 2018-08-17 PROCEDURE — 74011250636 HC RX REV CODE- 250/636: Performed by: INTERNAL MEDICINE

## 2018-08-17 PROCEDURE — 76060000031 HC ANESTHESIA FIRST 0.5 HR: Performed by: INTERNAL MEDICINE

## 2018-08-17 PROCEDURE — 85025 COMPLETE CBC W/AUTO DIFF WBC: CPT | Performed by: INTERNAL MEDICINE

## 2018-08-17 PROCEDURE — 70470 CT HEAD/BRAIN W/O & W/DYE: CPT

## 2018-08-17 PROCEDURE — 74011636320 HC RX REV CODE- 636/320: Performed by: RADIOLOGY

## 2018-08-17 PROCEDURE — 74011250637 HC RX REV CODE- 250/637: Performed by: INTERNAL MEDICINE

## 2018-08-17 PROCEDURE — 94760 N-INVAS EAR/PLS OXIMETRY 1: CPT

## 2018-08-17 PROCEDURE — 36415 COLL VENOUS BLD VENIPUNCTURE: CPT | Performed by: INTERNAL MEDICINE

## 2018-08-17 PROCEDURE — 74011000258 HC RX REV CODE- 258: Performed by: INTERNAL MEDICINE

## 2018-08-17 PROCEDURE — 77030013992 HC SNR POLYP ENDOSC BSC -B: Performed by: INTERNAL MEDICINE

## 2018-08-17 PROCEDURE — 65270000029 HC RM PRIVATE

## 2018-08-17 PROCEDURE — 76040000019: Performed by: INTERNAL MEDICINE

## 2018-08-17 PROCEDURE — 80048 BASIC METABOLIC PNL TOTAL CA: CPT | Performed by: INTERNAL MEDICINE

## 2018-08-17 RX ORDER — SODIUM CHLORIDE 9 MG/ML
50 INJECTION, SOLUTION INTRAVENOUS CONTINUOUS
Status: DISPENSED | OUTPATIENT
Start: 2018-08-17 | End: 2018-08-17

## 2018-08-17 RX ORDER — PROPOFOL 10 MG/ML
INJECTION, EMULSION INTRAVENOUS
Status: DISCONTINUED | OUTPATIENT
Start: 2018-08-17 | End: 2018-08-17 | Stop reason: HOSPADM

## 2018-08-17 RX ORDER — EPINEPHRINE 0.1 MG/ML
1 INJECTION INTRACARDIAC; INTRAVENOUS
Status: DISCONTINUED | OUTPATIENT
Start: 2018-08-17 | End: 2018-08-17 | Stop reason: HOSPADM

## 2018-08-17 RX ORDER — FLUMAZENIL 0.1 MG/ML
0.2 INJECTION INTRAVENOUS
Status: DISCONTINUED | OUTPATIENT
Start: 2018-08-17 | End: 2018-08-17 | Stop reason: HOSPADM

## 2018-08-17 RX ORDER — ATROPINE SULFATE 0.1 MG/ML
0.5 INJECTION INTRAVENOUS
Status: DISCONTINUED | OUTPATIENT
Start: 2018-08-17 | End: 2018-08-17 | Stop reason: HOSPADM

## 2018-08-17 RX ORDER — MAGNESIUM CITRATE
296 SOLUTION, ORAL ORAL
Status: COMPLETED | OUTPATIENT
Start: 2018-08-17 | End: 2018-08-17

## 2018-08-17 RX ORDER — DEXTROMETHORPHAN/PSEUDOEPHED 2.5-7.5/.8
1.2 DROPS ORAL
Status: DISCONTINUED | OUTPATIENT
Start: 2018-08-17 | End: 2018-08-17 | Stop reason: HOSPADM

## 2018-08-17 RX ORDER — FENTANYL CITRATE 50 UG/ML
100 INJECTION, SOLUTION INTRAMUSCULAR; INTRAVENOUS
Status: DISCONTINUED | OUTPATIENT
Start: 2018-08-17 | End: 2018-08-17 | Stop reason: HOSPADM

## 2018-08-17 RX ORDER — NALOXONE HYDROCHLORIDE 0.4 MG/ML
0.4 INJECTION, SOLUTION INTRAMUSCULAR; INTRAVENOUS; SUBCUTANEOUS
Status: DISCONTINUED | OUTPATIENT
Start: 2018-08-17 | End: 2018-08-17 | Stop reason: HOSPADM

## 2018-08-17 RX ORDER — METRONIDAZOLE 500 MG/100ML
500 INJECTION, SOLUTION INTRAVENOUS EVERY 8 HOURS
Status: DISCONTINUED | OUTPATIENT
Start: 2018-08-17 | End: 2018-08-20

## 2018-08-17 RX ORDER — PROPOFOL 10 MG/ML
INJECTION, EMULSION INTRAVENOUS AS NEEDED
Status: DISCONTINUED | OUTPATIENT
Start: 2018-08-17 | End: 2018-08-17 | Stop reason: HOSPADM

## 2018-08-17 RX ORDER — SODIUM CHLORIDE 9 MG/ML
INJECTION, SOLUTION INTRAVENOUS
Status: DISCONTINUED | OUTPATIENT
Start: 2018-08-17 | End: 2018-08-17 | Stop reason: HOSPADM

## 2018-08-17 RX ORDER — MIDAZOLAM HYDROCHLORIDE 1 MG/ML
.25-5 INJECTION, SOLUTION INTRAMUSCULAR; INTRAVENOUS
Status: DISCONTINUED | OUTPATIENT
Start: 2018-08-17 | End: 2018-08-17 | Stop reason: HOSPADM

## 2018-08-17 RX ADMIN — HYDROMORPHONE HYDROCHLORIDE 0.5 MG: 2 INJECTION, SOLUTION INTRAMUSCULAR; INTRAVENOUS; SUBCUTANEOUS at 22:14

## 2018-08-17 RX ADMIN — SODIUM CHLORIDE AND POTASSIUM CHLORIDE: 9; 1.49 INJECTION, SOLUTION INTRAVENOUS at 01:38

## 2018-08-17 RX ADMIN — DOCUSATE SODIUM 100 MG: 100 CAPSULE, LIQUID FILLED ORAL at 17:23

## 2018-08-17 RX ADMIN — PROPOFOL 50 MG: 10 INJECTION, EMULSION INTRAVENOUS at 08:30

## 2018-08-17 RX ADMIN — PROPOFOL 125 MCG/KG/MIN: 10 INJECTION, EMULSION INTRAVENOUS at 08:30

## 2018-08-17 RX ADMIN — HYDROCODONE BITARTRATE AND ACETAMINOPHEN 1 TABLET: 5; 325 TABLET ORAL at 00:07

## 2018-08-17 RX ADMIN — HYDROMORPHONE HYDROCHLORIDE 0.5 MG: 2 INJECTION, SOLUTION INTRAMUSCULAR; INTRAVENOUS; SUBCUTANEOUS at 16:38

## 2018-08-17 RX ADMIN — PREDNISONE 20 MG: 20 TABLET ORAL at 09:57

## 2018-08-17 RX ADMIN — DIPHENHYDRAMINE HYDROCHLORIDE 12.5 MG: 50 INJECTION, SOLUTION INTRAMUSCULAR; INTRAVENOUS at 00:17

## 2018-08-17 RX ADMIN — ENOXAPARIN SODIUM 40 MG: 40 INJECTION SUBCUTANEOUS at 21:18

## 2018-08-17 RX ADMIN — ONDANSETRON 4 MG: 2 INJECTION INTRAMUSCULAR; INTRAVENOUS at 08:03

## 2018-08-17 RX ADMIN — METRONIDAZOLE 500 MG: 500 INJECTION, SOLUTION INTRAVENOUS at 19:58

## 2018-08-17 RX ADMIN — METRONIDAZOLE 500 MG: 500 INJECTION, SOLUTION INTRAVENOUS at 12:42

## 2018-08-17 RX ADMIN — HYDROCODONE BITARTRATE AND ACETAMINOPHEN 1 TABLET: 5; 325 TABLET ORAL at 09:57

## 2018-08-17 RX ADMIN — SODIUM CHLORIDE: 9 INJECTION, SOLUTION INTRAVENOUS at 08:12

## 2018-08-17 RX ADMIN — HYDROMORPHONE HYDROCHLORIDE 0.5 MG: 2 INJECTION, SOLUTION INTRAMUSCULAR; INTRAVENOUS; SUBCUTANEOUS at 05:55

## 2018-08-17 RX ADMIN — SODIUM CHLORIDE 50 ML/HR: 900 INJECTION, SOLUTION INTRAVENOUS at 07:46

## 2018-08-17 RX ADMIN — Medication 10 ML: at 05:55

## 2018-08-17 RX ADMIN — MAGESIUM CITRATE 296 ML: 1.75 LIQUID ORAL at 00:16

## 2018-08-17 RX ADMIN — Medication 10 ML: at 14:00

## 2018-08-17 RX ADMIN — CEFTRIAXONE SODIUM 2 G: 2 INJECTION, POWDER, FOR SOLUTION INTRAMUSCULAR; INTRAVENOUS at 21:18

## 2018-08-17 RX ADMIN — Medication 10 ML: at 22:14

## 2018-08-17 RX ADMIN — IOPAMIDOL 100 ML: 612 INJECTION, SOLUTION INTRAVENOUS at 12:05

## 2018-08-17 RX ADMIN — HYDROMORPHONE HYDROCHLORIDE 0.5 MG: 2 INJECTION, SOLUTION INTRAMUSCULAR; INTRAVENOUS; SUBCUTANEOUS at 11:48

## 2018-08-17 NOTE — PERIOP NOTES
Rigo López Friend  1950  [id number]    Situation:    Scheduled Procedure: Procedure(s):  COLONOSCOPY  ENDOSCOPIC POLYPECTOMY  Verbal report received from: Fern Lopez RN  Preoperative diagnosis: abnormal CT     Background:    Procedure: Procedure(s):  COLONOSCOPY  ENDOSCOPIC POLYPECTOMY  Physician performing procedure; Dr. Samm Mcclain RN    NPO Status/Last PO Intake: 12 midnight    Pregnancy Test:Not applicable If yes, result: none    Is the patient taking Blood Thinners: NO If yes, list:  and last taken   Is the patient diabetic:no      If yes, what was the last BS:    Time taken? Anything given? no           Does the patient have a Pacemaker/Defibrillator in place?: no   Does the patient need antibiotics before/during/after procedure: no   If the patient is having a colon, How much prep was drank? all   What were the Colon prep results? Clear yellow   Does the patient have SCD in place:no   Is patient on CONTACT precautions:no        If yes, what kind of CONTACT precautions:     Assessment:  Are the vital signs stable prior to patient coming to ENDO?  yes  Is the patient alert/oriented and able to sign consent for the procedures:yes  How does the patient's abdomen feel prior to coming to ENDO? round and soft yes   Does the patient have a patient IV in place?  Yes #20 right wrist     Recommendation:  Family or Friend present no not at this time    Permission to share finding with Family or Friend yes

## 2018-08-17 NOTE — ANESTHESIA POSTPROCEDURE EVALUATION
Post-Anesthesia Evaluation and Assessment    Patient: Ashley Lerner MRN: [id number]  SSN: [id number]    YOB: 1950  Age: 76 y.o. Sex: female       Cardiovascular Function/Vital Signs  Visit Vitals    /67    Pulse 67    Temp 36.6 °C (97.8 °F)    Resp 24    Ht 5' 8\" (1.727 m)    Wt 97.1 kg (214 lb)    SpO2 99%    BMI 32.54 kg/m2       Patient is status post MAC anesthesia for Procedure(s):  COLONOSCOPY  ENDOSCOPIC POLYPECTOMY. Nausea/Vomiting: None    Postoperative hydration reviewed and adequate. Pain:  Pain Scale 1: Numeric (0 - 10) (08/17/18 0857)  Pain Intensity 1: 0 (08/17/18 0857)   Managed    Neurological Status: At baseline    Mental Status and Level of Consciousness: Arousable    Pulmonary Status:   O2 Device: Room air (08/17/18 0857)   Adequate oxygenation and airway patent    Complications related to anesthesia: None    Post-anesthesia assessment completed.  No concerns    Signed By: Scott Patel MD     August 17, 2018

## 2018-08-17 NOTE — PROGRESS NOTES
Edward Ray Friend  1950  [id number]    Situation:  Verbal report received from:   Kelli Guillen RN  Procedure: Procedure(s):  COLONOSCOPY  ENDOSCOPIC POLYPECTOMY    Background:    Preoperative diagnosis: Colon  Postoperative diagnosis: Diverticulosis  Colon polyp removed by cold snare    :  Dr. Curt Higuera  Assistant(s): Endoscopy RN-1: Iliana Ji RN; Marj Noonan RN    Specimens:   ID Type Source Tests Collected by Time Destination   1 : Colon polyps Preservative   Jake Stone MD 8/17/2018 8444 Pathology     H. Pylori  no    Assessment:  Intra-procedure medications     Anesthesia gave intra-procedure sedation and medications, see anesthesia flow sheet yes    Intravenous fluids: NS@ KVO     Vital signs stable   yes    Abdominal assessment: round and soft   yes    Recommendation:  Discharge patient per MD order  inpatient.   Return to floor  yes  Family or Friend   None present  Permission to share finding with family or friend yes

## 2018-08-17 NOTE — ANESTHESIA PREPROCEDURE EVALUATION
Anesthetic History   No history of anesthetic complications            Review of Systems / Medical History  Patient summary reviewed, nursing notes reviewed and pertinent labs reviewed    Pulmonary  Within defined limits                 Neuro/Psych   Within defined limits           Cardiovascular  Within defined limits                Exercise tolerance: >4 METS     GI/Hepatic/Renal  Within defined limits              Endo/Other  Within defined limits      Obesity     Other Findings              Physical Exam    Airway  Mallampati: II    Neck ROM: normal range of motion   Mouth opening: Normal     Cardiovascular  Regular rate and rhythm,  S1 and S2 normal,  no murmur, click, rub, or gallop  Rhythm: regular  Rate: normal         Dental  No notable dental hx       Pulmonary  Breath sounds clear to auscultation               Abdominal  GI exam deferred       Other Findings            Anesthetic Plan    ASA: 2  Anesthesia type: MAC          Induction: Intravenous  Anesthetic plan and risks discussed with: Patient

## 2018-08-17 NOTE — PROCEDURES
[address]  [phone]      2018    Colonoscopy Procedure Note  Curtis King  :  1950  [facility] Medical Record Number: [id number]    Indications:     Anemia, abnormal CT abdomen  PCP:  None  Anesthesia/Sedation: Conscious Sedation/Moderate Sedation  Endoscopist:  Dr. Jillian Dominguez  Complications:  None  Estimate Blood Loss:  None    Permit:  The indications, risks, benefits and alternatives were reviewed with the patient or their decision maker who was provided an opportunity to ask questions and all questions were answered. The specific risks of colonoscopy with conscious sedation were reviewed, including but not limited to anesthetic complication, bleeding, adverse drug reaction, missed lesion, infection, IV site reactions, and intestinal perforation which would lead to the need for surgical repair. Alternatives to colonoscopy including radiographic imaging, observation without testing, or laboratory testing were reviewed including the limitations of those alternatives. After considering the options and having all their questions answered, the patient or their decision maker provided both verbal and written consent to proceed. Procedure in Detail:  After obtaining informed consent, positioning of the patient in the left lateral decubitus position, and conduction of a pre-procedure pause or \"time out\" the endoscope was introduced into the anus and advanced to the terminal ileum. The quality of the colonic preparation was satisfactory. A careful inspection was made as the colonoscope was withdrawn, findings and interventions are described below.     Appendiceal orifice photographed    Findings:       - Diverticulosis  Numerous sigmoid, descending with fewer transverse and in ileum; no inflammation  Two polyps both under 12 mm,both sessile    Specimens:    both polyps removed cold snare    Complications:   None; patient tolerated the procedure well. Estimated blood loss: none    Impression:  colon polyps without cancer; diverticulosis without diverticulitis    Recommendations:     - Repeat colonoscopy in 5 years. Thank you for entrusting me with this patient's care. Please do not hesitate to contact me with any questions or if I can be of assistance with any of your other patients' GI needs.     Signed By: Meaghan Draper MD                        August 17, 2018

## 2018-08-17 NOTE — PERIOP NOTES
46 Received SBAR from Jerel Jimenez, Endoscopy Manager. 0830 Procedure being performed under MAC; JODY Kulkarni at bedside monitoring patient. See anesthesia notes. 2481 Endoscope was pre-cleaned at bedside immediately following procedure by Ava Smith. 0179 Patient received Propofol, per JODY Kulkarni. Care of patient assumed from the anesthesia provider. Patient tolerated procedure well. Abdomen remains soft and non tender post procedure, no complaints or indication of discomfort noted at this time. See anesthesia note. Patient transferred to Endoscopy Recovery and report given to recovery nurse. 1262 TRANSFER - OUT REPORT:    Verbal report given to [facility] RN (name) on [facility]  being transferred to [address] (unit) for routine progression of care       Report consisted of patients Situation, Background, Assessment and   Recommendations(SBAR). Information from the following report(s) SBAR, Procedure Summary, MAR and Recent Results was reviewed with the receiving nurse. Lines:   Peripheral IV 08/16/18 Right Wrist (Active)   Site Assessment Clean, dry, & intact 8/17/2018  7:46 AM   Phlebitis Assessment 0 8/17/2018  7:46 AM   Infiltration Assessment 0 8/17/2018  7:46 AM   Dressing Status Clean, dry, & intact 8/17/2018  7:46 AM   Dressing Type Transparent;Tape 8/17/2018  7:46 AM   Hub Color/Line Status Pink;Capped;Flushed; Infusing 8/17/2018  7:46 AM   Action Taken Open ports on tubing capped 8/17/2018  7:46 AM   Alcohol Cap Used Yes 8/17/2018  7:46 AM        Opportunity for questions and clarification was provided.       Patient transported with:   Patient chart and labels   IV lock #20 right wrist   Glasses

## 2018-08-17 NOTE — PROGRESS NOTES
08/17/18  Late Entry from 8/16/18    MSW TT ID MD to see if patient will be discharging over the weekend on IV ABX so we could start referral. MD reported that she is not sure at this time. MSW met with the patient to introduce myself. She reported not feeling well still. She had a colonoscopy and CT of head today. MD in rounds reported she is not medically stable to be discharged yet.     Ericka Bonilla MSW

## 2018-08-17 NOTE — PROGRESS NOTES
Bedside and Verbal shift change report given to Andrew Patel (oncoming nurse) by Vael Marroquin (offgoing nurse). Report included the following information SBAR, Kardex, Procedure Summary, Intake/Output, MAR, Accordion and Recent Results.

## 2018-08-17 NOTE — ROUTINE PROCESS
Bedside and Verbal shift change report given to Eleazar Romero (oncoming nurse) by Chuck Morfin RN (offgoing nurse). Report included the following information SBAR, Kardex, MAR and Recent Results.

## 2018-08-17 NOTE — PROGRESS NOTES
GI    Colon polyps without cancer; diverticulosis without infection seen on colon exam.  Can not state infection from intestinal source at this time    Will see as needed over weekend    Thanks

## 2018-08-17 NOTE — PROGRESS NOTES
[facility]  [address] [facility], [address] [place] [address]  [phone]      Medical Progress Note      NAME: Helena Mcdonald   :  1950  MRM:  [id number]    Date/Time: 2018  9:19 AM       Assessment and Plan:   1. Bacteremia/Leukocytosis. not septic. Blood Cx on  and  with alpha-strep. Unclear source. Patient has been on Keflex. Echo is unremarkable for vegetation. OBED: no endocarditis. LP is unremarkable for bacterial infection. CT of the abdomen and pelvis is unremarkable. On ceftriaxone. ID consult appreciated. Discussed with ID and recommended colonoscopy to see for malignancy. Colonoscopy is unremarkable. Will check CT scan of the brain with contrast( recommended by ID). repeat BC from  is negative so far.        2. Intractable headache: Hx of migraine in the past. Head CT and MRI of the brain: negative. LP is unremarkable for bacterial infection. Neurology evaluation appreciated. Pain management. On steroid for a total of 5 days.              Subjective:     Chief Complaint:  Follow up of pt who was admitted with bacteremia. C/o vomiting. No nausea or vomiting     ROS:  (bold if positive, if negative)      Tolerating PT  Tolerating Diet        Objective:     Last 24hrs VS reviewed since prior progress note.  Most recent are:    Visit Vitals    /72    Pulse 68    Temp 97.8 °F (36.6 °C)    Resp 14    Ht 5' 8\" (1.727 m)    Wt 97.1 kg (214 lb)    SpO2 95%    BMI 32.54 kg/m2     SpO2 Readings from Last 6 Encounters:   18 95%   08/10/18 97%   18 94%            Intake/Output Summary (Last 24 hours) at 18 1026  Last data filed at 18 0845   Gross per 24 hour   Intake           852.48 ml   Output                0 ml   Net           852.48 ml        Physical Exam:    Gen:  Well-developed, well-nourished, in no acute distress  HEENT:  Pink conjunctivae, PERRL, hearing intact to voice, moist mucous membranes  Neck:  Supple, without masses, thyroid non-tender  Resp:  No accessory muscle use, clear breath sounds without wheezes rales or rhonchi  Card:  No murmurs, normal S1, S2 without thrills, bruits or peripheral edema  Abd:  Soft, non-tender, non-distended, normoactive bowel sounds are present, no palpable organomegaly and no detectable hernias  Lymph:  No cervical or inguinal adenopathy  Musc:  No cyanosis or clubbing  Skin:  No rashes or ulcers, skin turgor is good  Neuro:  Cranial nerves are grossly intact, no focal motor weakness, follows commands appropriately  Psych:  Good insight, oriented to person, place and time, alert  __________________________________________________________________  Medications Reviewed: (see below)  Medications:     Current Facility-Administered Medications   Medication Dose Route Frequency    0.9% sodium chloride infusion  50 mL/hr IntraVENous CONTINUOUS    predniSONE (DELTASONE) tablet 20 mg  20 mg Oral DAILY WITH BREAKFAST    cefTRIAXone (ROCEPHIN) 2 g in 0.9% sodium chloride (MBP/ADV) 50 mL  2 g IntraVENous Q24H    butalbital-acetaminophen-caffeine (FIORICET, ESGIC) -40 mg per tablet 1 Tab  1 Tab Oral Q4H PRN    0.9% sodium chloride with KCl 20 mEq/L infusion   IntraVENous CONTINUOUS    sodium chloride (NS) flush 5-10 mL  5-10 mL IntraVENous Q8H    sodium chloride (NS) flush 5-10 mL  5-10 mL IntraVENous PRN    HYDROcodone-acetaminophen (NORCO) 5-325 mg per tablet 1 Tab  1 Tab Oral Q4H PRN    HYDROmorphone (PF) (DILAUDID) injection 0.5 mg  0.5 mg IntraVENous Q4H PRN    naloxone (NARCAN) injection 0.4 mg  0.4 mg IntraVENous PRN    diphenhydrAMINE (BENADRYL) injection 12.5 mg  12.5 mg IntraVENous Q4H PRN    ondansetron (ZOFRAN) injection 4 mg  4 mg IntraVENous Q6H PRN    docusate sodium (COLACE) capsule 100 mg  100 mg Oral BID    enoxaparin (LOVENOX) injection 40 mg  40 mg SubCUTAneous Q24H        Lab Data Reviewed: (see below)  Lab Review:     Recent Labs      08/17/18   6937 08/16/18   0254  08/15/18   0340   WBC  20.3*  14.0*  13.7*   HGB  12.4  10.9*  11.5   HCT  36.8  32.8*  34.2*   PLT  373  297  310     Recent Labs      08/17/18   0136  08/16/18   0254  08/15/18   0340   NA  138  137  136   K  3.7  3.9  3.9   CL  104  107  104   CO2  24  23  25   GLU  108*  98  105*   BUN  7  12  12   CREA  0.76  0.78  0.76   CA  9.1  8.6  8.3*     No results found for: GLUCPOC  No results for input(s): PH, PCO2, PO2, HCO3, FIO2 in the last 72 hours. No results for input(s): INR in the last 72 hours. No lab exists for component: Florina Hernandez  All Micro Results     Procedure Component Value Units Date/Time    CULTURE, BLOOD [[id number]] Collected:  08/16/18 1107    Order Status:  Completed Specimen:  Blood from Blood Updated:  08/17/18 0816     Special Requests: NO SPECIAL REQUESTS        Culture result: NO GROWTH AFTER 20 HOURS       CULTURE, CSF Willistine Fam STAIN [[id number]] Collected:  08/14/18 1312    Order Status:  Completed Specimen:  Cerebrospinal Fluid Updated:  08/16/18 1336     Special Requests: NO SPECIAL REQUESTS        GRAM STAIN RARE WBCS SEEN         NO ORGANISMS SEEN                 Smear Reviewed by Microbiology     Culture result:         Culture performed on Unspun Fluid      NO GROWTH 2 DAYS       CULTURE, BLOOD [[id number]]  (Abnormal)  (Susceptibility) Collected:  08/13/18 1544    Order Status:  Completed Specimen:  Blood from Blood Updated:  08/16/18 1316     Special Requests: NO SPECIAL REQUESTS        Culture result:         ALPHA STREPTOCOCCUS, NOT S. PNEUMONIAE GROWING IN 1 OF 2 BOTTLES DRAWN SITE = L ARM (SENSITIVITIES PERFORMED BY E-TEST) . Huizar Junk Huizar Junk AN ISOLATE WILL BE SENT TO OUR REFERENCE LAB FOR IDENTIFICATION.  PLEASE REFER TO [id number], FOR SUBSEQUENT RESULTS (A)              REMAINING BOTTLE(S) HAS/HAVE NO GROWTH SO FAR    ORGANISM ID BY MALDI [[id number]] Collected:  08/13/18 1544    Order Status:  Completed Updated:  08/16/18 1200    CULTURE, URINE [[id number]] Collected: 08/13/18 1447    Order Status:  Completed Specimen:  Urine from Clean catch Updated:  08/15/18 0748     Special Requests: NO SPECIAL REQUESTS        Irwin Count --        <10,000  COLONIES/mL       Culture result: NO SIGNIFICANT GROWTH       MENINGITIS PATHOGENS PANEL, CSF (BY PCR) [[id number]] Collected:  08/14/18 1700    Order Status:  Completed Specimen:  Cerebrospinal Fluid from Cerebrospinal Fluid Updated:  08/14/18 1904     Escherichia coli K1 NOT DETECTED        Haemophilus Influenzae NOT DETECTED        Listeria Monocytogenes NOT DETECTED        Neisseria Meningitidis NOT DETECTED        Streptococcus Agalactiae NOT DETECTED        Streptococcus Pneumoniae NOT DETECTED        Cytomegalovirus NOT DETECTED        Enterovirus NOT DETECTED        Herpes Simplex Virus 1 NOT DETECTED        Herpes Simplex Virus 2 NOT DETECTED        Human Herpesvirus 6 NOT DETECTED        Human Parechovirus NOT DETECTED        Varicella Zoster Virus NOT DETECTED        Crypto. neoformans/gattii NOT DETECTED       URINE CULTURE HOLD SAMPLE [[id number]] Collected:  08/13/18 1447    Order Status:  Completed Specimen:  Urine from Serum Updated:  08/13/18 1449     Urine culture hold         URINE ON HOLD IN MICROBIOLOGY DEPT FOR 3 DAYS. IF UNPRESERVED URINE IS SUBMITTED, IT CANNOT BE USED FOR ADDITIONAL TESTING AFTER 24 HRS, RECOLLECTION WILL BE REQUIRED. CULTURE, BLOOD [[id number]] Collected:  08/13/18 1415    Order Status:  Canceled Specimen:  Blood           I have reviewed notes of prior 24hr. Other pertinent lab:       Total time spent with patient: [place] [address] discussed with: Patient, Nursing Staff and >50% of time spent in counseling and coordination of care    Discussed:  Care Plan    Prophylaxis:  Lovenox    Disposition:  Home w/Family           ___________________________________________________    Attending Physician: Elina Daniels MD

## 2018-08-17 NOTE — PROGRESS NOTES
Friend is a 76 y.o. female with no significant medical history presents with headache of 10 days duration - On 8/3/18 pt had gone to [employer] and ate shrimps and developed a headache She took motrin round the clock. She went to work on Monday the 6th. ( works as a medical administrator) . On 8/7 she could not go to work because of severe headache and chills and came to ED - They did a urine test and gave her keflex for UTI even though she clearly did not have any urinary symptoms/They discharged her on Fioricet . CT of head was normal . Blood culture was sent as she had fever  The blood culture came positive the next day and they tried to reach her  She came back to the ED on 8/10 with headache but was sent home as she had no fever.  She also was given a shot of decadron  She came back to the ED on 8/13 as advil was not helping her  She had taken 24 tablets of advil in a day  Blood culture was sent again and she was started on zosyn  She was seen by neurology and LP was done and I am asked to see her for the positive blood culture     Pt denies sore throat, runny nose, cough, sob, diarrhea, dysuria, joint pain or rash  She had a mosquito bite 10 days and the area on the rt arm was swollen and red for a few days  She has no travel history  No animal contact  No tick bites  No steroid injections  No hardware in her body  No recent dental work  Not sexually active in 10 yrs  She takes care 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      subjective  Feeling better  No fever  Headache better      Objective:   VITALS:    Patient Vitals for the past 12 hrs:   Temp Pulse Resp BP SpO2   08/17/18 1938 98.5 °F (36.9 °C) 66 16 138/77 94 %   08/17/18 1243 98.4 °F (36.9 °C) 64 16 (!) 170/91 100 %   08/17/18 0913 - 68 14 - 95 %   08/17/18 0908 - 66 15 155/72 95 %   08/17/18 0903 - 69 19 152/82 99 %   08/17/18 0857 - 67 24 149/67 99 %   08/17/18 0852 97.8 °F (36.6 °C) 67 17 145/84 99 %   08/17/18 0753 98.4 °F (36.9 °C) 70 15 163/78 100 %    PHYSICAL EXAM:   General:                    Alert, cooperative, no distress,Many absent teeth- poor dentition  Neck:                                   Supple, symmetrical,  no adenopathy, thyroid: non tender                                              no carotid bruit and no JVD. Back:                                   No CVA tenderness. Lungs:                       Clear to auscultation bilaterally. No Wheezing or Rhonchi. No rales. Heart:                                  Regular rate and rhythm,  no murmur, rub or gallop. Abdomen:                  Soft, non-tender,not distended. Bowel sounds normal. No masses  Extremities:               Extremities normal, atraumatic, no cyanosis. No edema. No clubbing  Skin:                                    No rashes or lesions.   Not Jaundiced  Lymph:                      Cervical, supraclavicular normal.  Neurologic:                Grossly non-focal     Pertinent Labs   Wbc 20.6  Hb 12.2    Cr 0.82  ALT 29  AST 20  Alk po4 -95  LP csf -RBC 1  WBC 11  N 33  L 53  M 14  Protein 47  OBED N  CT head with contrast Neg     Microbiology  BC-8/7 alpha strep  8/13 gram positive cocci in pairs  IMAGING RESULTS:  CT head N    Impression/Recommendation  76 y.o. female with no significant medical history presents with headache of 10 days duration and intermittent fever but masked by NSAID     Alpha streptococcus bacteremia-  leucocytosis   unusual presentation  Unclear source- can have a deep source   oral cavity-poor dentition-  2 separate positive cultures over a period of 5 days   Alpha strep could be strep oralis/mitis/viridans  Currently  ceftriaxone   OBED no endocarditis   CT abdomen/pelvis no abscess- diverticulosis  Colonoscopy no malignancy  Recommend  tagged WBC scan  The LP findings are not suggestive of bacterial meningitis- mycotic seeding from endocarditis is also not possible  Could very well be a NSAID effect     Severe headache- there is an element of rebound headache from too much NSAID use/ also has h/o migraine     Trichomonas in the urine from 8/7 - pt not sexually active in 10 yrs- she got metronidazole  HIV test Negative     ___________________________________________________     Discussed with patient  Bambi Vernon will follow her on monday

## 2018-08-18 LAB
BASOPHILS # BLD: 0 K/UL (ref 0–0.1)
BASOPHILS NFR BLD: 0 % (ref 0–1)
DIFFERENTIAL METHOD BLD: ABNORMAL
EOSINOPHIL # BLD: 0.1 K/UL (ref 0–0.4)
EOSINOPHIL NFR BLD: 1 % (ref 0–7)
ERYTHROCYTE [DISTWIDTH] IN BLOOD BY AUTOMATED COUNT: 12.9 % (ref 11.5–14.5)
HCT VFR BLD AUTO: 34.2 % (ref 35–47)
HGB BLD-MCNC: 11.5 G/DL (ref 11.5–16)
IMM GRANULOCYTES # BLD: 0.1 K/UL (ref 0–0.04)
IMM GRANULOCYTES NFR BLD AUTO: 1 % (ref 0–0.5)
LYMPHOCYTES # BLD: 3 K/UL (ref 0.8–3.5)
LYMPHOCYTES NFR BLD: 17 % (ref 12–49)
MCH RBC QN AUTO: 30.6 PG (ref 26–34)
MCHC RBC AUTO-ENTMCNC: 33.6 G/DL (ref 30–36.5)
MCV RBC AUTO: 91 FL (ref 80–99)
MONOCYTES # BLD: 1 K/UL (ref 0–1)
MONOCYTES NFR BLD: 6 % (ref 5–13)
NEUTS SEG # BLD: 13.2 K/UL (ref 1.8–8)
NEUTS SEG NFR BLD: 76 % (ref 32–75)
NRBC # BLD: 0 K/UL (ref 0–0.01)
NRBC BLD-RTO: 0 PER 100 WBC
PLATELET # BLD AUTO: 400 K/UL (ref 150–400)
PMV BLD AUTO: 10.8 FL (ref 8.9–12.9)
RBC # BLD AUTO: 3.76 M/UL (ref 3.8–5.2)
WBC # BLD AUTO: 17.4 K/UL (ref 3.6–11)

## 2018-08-18 PROCEDURE — 74011000258 HC RX REV CODE- 258: Performed by: INTERNAL MEDICINE

## 2018-08-18 PROCEDURE — 36415 COLL VENOUS BLD VENIPUNCTURE: CPT | Performed by: INTERNAL MEDICINE

## 2018-08-18 PROCEDURE — 65270000029 HC RM PRIVATE

## 2018-08-18 PROCEDURE — 74011636637 HC RX REV CODE- 636/637: Performed by: PSYCHIATRY & NEUROLOGY

## 2018-08-18 PROCEDURE — 74011250637 HC RX REV CODE- 250/637: Performed by: INTERNAL MEDICINE

## 2018-08-18 PROCEDURE — 74011250636 HC RX REV CODE- 250/636: Performed by: INTERNAL MEDICINE

## 2018-08-18 PROCEDURE — 94760 N-INVAS EAR/PLS OXIMETRY 1: CPT

## 2018-08-18 PROCEDURE — 85025 COMPLETE CBC W/AUTO DIFF WBC: CPT | Performed by: INTERNAL MEDICINE

## 2018-08-18 RX ORDER — ACETAMINOPHEN 325 MG/1
650 TABLET ORAL
Status: DISCONTINUED | OUTPATIENT
Start: 2018-08-18 | End: 2018-08-21 | Stop reason: HOSPADM

## 2018-08-18 RX ADMIN — DIPHENHYDRAMINE HYDROCHLORIDE 12.5 MG: 50 INJECTION, SOLUTION INTRAMUSCULAR; INTRAVENOUS at 04:14

## 2018-08-18 RX ADMIN — Medication 10 ML: at 02:22

## 2018-08-18 RX ADMIN — METRONIDAZOLE 500 MG: 500 INJECTION, SOLUTION INTRAVENOUS at 20:41

## 2018-08-18 RX ADMIN — Medication 10 ML: at 06:47

## 2018-08-18 RX ADMIN — ONDANSETRON 4 MG: 2 INJECTION INTRAMUSCULAR; INTRAVENOUS at 04:17

## 2018-08-18 RX ADMIN — PREDNISONE 20 MG: 20 TABLET ORAL at 08:28

## 2018-08-18 RX ADMIN — SODIUM CHLORIDE AND POTASSIUM CHLORIDE: 9; 1.49 INJECTION, SOLUTION INTRAVENOUS at 15:13

## 2018-08-18 RX ADMIN — HYDROMORPHONE HYDROCHLORIDE 0.5 MG: 2 INJECTION, SOLUTION INTRAMUSCULAR; INTRAVENOUS; SUBCUTANEOUS at 06:47

## 2018-08-18 RX ADMIN — DOCUSATE SODIUM 100 MG: 100 CAPSULE, LIQUID FILLED ORAL at 08:28

## 2018-08-18 RX ADMIN — HYDROMORPHONE HYDROCHLORIDE 0.5 MG: 2 INJECTION, SOLUTION INTRAMUSCULAR; INTRAVENOUS; SUBCUTANEOUS at 02:21

## 2018-08-18 RX ADMIN — CEFTRIAXONE SODIUM 2 G: 2 INJECTION, POWDER, FOR SOLUTION INTRAMUSCULAR; INTRAVENOUS at 22:20

## 2018-08-18 RX ADMIN — DOCUSATE SODIUM 100 MG: 100 CAPSULE, LIQUID FILLED ORAL at 17:55

## 2018-08-18 RX ADMIN — Medication 10 ML: at 22:22

## 2018-08-18 RX ADMIN — METRONIDAZOLE 500 MG: 500 INJECTION, SOLUTION INTRAVENOUS at 11:53

## 2018-08-18 RX ADMIN — METRONIDAZOLE 500 MG: 500 INJECTION, SOLUTION INTRAVENOUS at 04:00

## 2018-08-18 RX ADMIN — ACETAMINOPHEN 650 MG: 325 TABLET ORAL at 09:24

## 2018-08-18 RX ADMIN — ENOXAPARIN SODIUM 40 MG: 40 INJECTION SUBCUTANEOUS at 20:41

## 2018-08-18 NOTE — PROGRESS NOTES
Chucho Ordazelsen [facility] [address], [place], [address] [facility]  [phone]      Medical Progress Note      NAME: Alessandra So   :  1950  MRM:  [id number]    Date/Time: 2018  9:19 AM       Assessment and Plan:   1. Bacteremia/Leukocytosis. not septic. Blood Cx on  and  with alpha-strep. Unclear source. Echo is unremarkable for vegetation. OBED: no endocarditis. LP is unremarkable for bacterial infection. CT of the abdomen and pelvis is unremarkable. CT head w/ contrastr nml. Colonoscopy normal. On ceftriaxone. ID consult appreciated. Repeat BC from  is negative so far. Plan is for WBC tagged scan on Monday to look for deep infecvtion. Still awaiting final speciation from reference lab.     2. Intractable headache: Hx of migraine in the past. Head CT and MRI of the brain: negative. LP is unremarkable for bacterial infection. Neurology evaluation appreciated. Pain management. On steroid for a total of 5 days.              Subjective:     Chief Complaint:  Patient seen and examined. Chart reviewed. Patient reports no fevers but has ongoing headache that seemed best controlled with NSAIDs but overall is slowly improving. ROS:  (bold if positive, if negative)      Tolerating PT  Tolerating Diet        Objective:     Last 24hrs VS reviewed since prior progress note.  Most recent are:    Visit Vitals    /78 (BP 1 Location: Left arm)    Pulse 63    Temp 98.3 °F (36.8 °C)    Resp 18    Ht 5' 8\" (1.727 m)    Wt 97.1 kg (214 lb)    SpO2 95%    BMI 32.54 kg/m2     SpO2 Readings from Last 6 Encounters:   18 95%   08/10/18 97%   18 94%            Intake/Output Summary (Last 24 hours) at 18 0820  Last data filed at 18 0422   Gross per 24 hour   Intake              550 ml   Output              850 ml   Net             -300 ml        Physical Exam:    Gen:  Well-developed, well-nourished, in no acute distress  HEENT:  Pink conjunctivae, PERRL, hearing intact to voice, moist mucous membranes  Neck:  Supple, without masses, thyroid non-tender  Resp:  No accessory muscle use, clear breath sounds without wheezes rales or rhonchi  Card:  No murmurs, normal S1, S2 without thrills, bruits or peripheral edema  Abd:  Soft, non-tender, non-distended, normoactive bowel sounds are present, no palpable organomegaly and no detectable hernias  Lymph:  No cervical or inguinal adenopathy  Musc:  No cyanosis or clubbing  Skin:  No rashes or ulcers, skin turgor is good  Neuro:  Cranial nerves are grossly intact, no focal motor weakness, follows commands appropriately  Psych:  Good insight, oriented to person, place and time, alert  __________________________________________________________________  Medications Reviewed: (see below)  Medications:     Current Facility-Administered Medications   Medication Dose Route Frequency    metroNIDAZOLE (FLAGYL) IVPB premix 500 mg  500 mg IntraVENous Q8H    predniSONE (DELTASONE) tablet 20 mg  20 mg Oral DAILY WITH BREAKFAST    cefTRIAXone (ROCEPHIN) 2 g in 0.9% sodium chloride (MBP/ADV) 50 mL  2 g IntraVENous Q24H    butalbital-acetaminophen-caffeine (FIORICET, ESGIC) -40 mg per tablet 1 Tab  1 Tab Oral Q4H PRN    0.9% sodium chloride with KCl 20 mEq/L infusion   IntraVENous CONTINUOUS    sodium chloride (NS) flush 5-10 mL  5-10 mL IntraVENous Q8H    sodium chloride (NS) flush 5-10 mL  5-10 mL IntraVENous PRN    HYDROcodone-acetaminophen (NORCO) 5-325 mg per tablet 1 Tab  1 Tab Oral Q4H PRN    HYDROmorphone (PF) (DILAUDID) injection 0.5 mg  0.5 mg IntraVENous Q4H PRN    naloxone (NARCAN) injection 0.4 mg  0.4 mg IntraVENous PRN    diphenhydrAMINE (BENADRYL) injection 12.5 mg  12.5 mg IntraVENous Q4H PRN    ondansetron (ZOFRAN) injection 4 mg  4 mg IntraVENous Q6H PRN    docusate sodium (COLACE) capsule 100 mg  100 mg Oral BID    enoxaparin (LOVENOX) injection 40 mg  40 mg SubCUTAneous Q24H        Lab Data Reviewed: (see below)  Lab Review:     Recent Labs      08/18/18   0232  08/17/18   0136  08/16/18   0254   WBC  17.4*  20.3*  14.0*   HGB  11.5  12.4  10.9*   HCT  34.2*  36.8  32.8*   PLT  400  373  297     Recent Labs      08/17/18   0136  08/16/18   0254   NA  138  137   K  3.7  3.9   CL  104  107   CO2  24  23   GLU  108*  98   BUN  7  12   CREA  0.76  0.78   CA  9.1  8.6     No results found for: GLUCPOC  No results for input(s): PH, PCO2, PO2, HCO3, FIO2 in the last 72 hours. No results for input(s): INR in the last 72 hours. No lab exists for component: INREXT, INREXT  All Micro Results     Procedure Component Value Units Date/Time    CULTURE, BLOOD [[id number]]  (Abnormal)  (Susceptibility) Collected:  08/13/18 1544    Order Status:  Completed Specimen:  Blood from Blood Updated:  08/18/18 0634     Special Requests: NO SPECIAL REQUESTS        Culture result:         ALPHA STREPTOCOCCUS, NOT S. PNEUMONIAE GROWING IN 1 OF 2 BOTTLES DRAWN SITE = L ARM (SENSITIVITIES PERFORMED BY E-TEST) . Ethelene Gauss Ethelene Gauss AN ISOLATE WILL BE SENT TO OUR REFERENCE LAB FOR IDENTIFICATION. PLEASE REFER TO [id number], FOR SUBSEQUENT RESULTS (A)              GRAM POSITIVE COCCI IN CHAINS GROWING IN THE 2ND OF 2 BOTTLES DRAWN SITE = L ARM (A)    ORGANISM ID BY MALDI [[id number]] Collected:  08/13/18 1544    Order Status:  Completed Specimen:  MICROBIAL ISOLATE Updated:  08/17/18 1940     Source BLOOD     Organism ID by Carolina Boyle Close Comment         (NOTE)  Specimen has been received. Testing has been initiated.   Performed At: [address] [id number]  Keri Mendoza MD ZE:[id number]         [place], CSF Marina Lola STAIN [[id number]] Collected:  08/14/18 1312    Order Status:  Completed Specimen:  Cerebrospinal Fluid Updated:  08/17/18 1102     Special Requests: NO SPECIAL REQUESTS        GRAM STAIN RARE WBCS SEEN         NO ORGANISMS SEEN                 Smear Reviewed by Microbiology     Culture result:         Culture performed on Unspun Fluid      NO GROWTH 3 DAYS       CULTURE, BLOOD [[id number]] Collected:  08/16/18 1107    Order Status:  Completed Specimen:  Blood from Blood Updated:  08/17/18 0816     Special Requests: NO SPECIAL REQUESTS        Culture result: NO GROWTH AFTER 20 HOURS       CULTURE, URINE [[id number]] Collected:  08/13/18 1447    Order Status:  Completed Specimen:  Urine from Clean catch Updated:  08/15/18 0748     Special Requests: NO SPECIAL REQUESTS        [place] Count --        <10,000  COLONIES/mL       Culture result: NO SIGNIFICANT GROWTH       MENINGITIS PATHOGENS PANEL, CSF (BY PCR) [[id number]] Collected:  08/14/18 1700    Order Status:  Completed Specimen:  Cerebrospinal Fluid from Cerebrospinal Fluid Updated:  08/14/18 1904     Escherichia coli K1 NOT DETECTED        Haemophilus Influenzae NOT DETECTED        Listeria Monocytogenes NOT DETECTED        Neisseria Meningitidis NOT DETECTED        Streptococcus Agalactiae NOT DETECTED        Streptococcus Pneumoniae NOT DETECTED        Cytomegalovirus NOT DETECTED        Enterovirus NOT DETECTED        Herpes Simplex Virus 1 NOT DETECTED        Herpes Simplex Virus 2 NOT DETECTED        Human Herpesvirus 6 NOT DETECTED        Human Parechovirus NOT DETECTED        Varicella Zoster Virus NOT DETECTED        Crypto. neoformans/gattii NOT DETECTED       URINE CULTURE HOLD SAMPLE [[id number]] Collected:  08/13/18 1447    Order Status:  Completed Specimen:  Urine from Serum Updated:  08/13/18 1449     Urine culture hold         URINE ON HOLD IN MICROBIOLOGY DEPT FOR 3 DAYS. IF UNPRESERVED URINE IS SUBMITTED, IT CANNOT BE USED FOR ADDITIONAL TESTING AFTER 24 HRS, RECOLLECTION WILL BE REQUIRED. CULTURE, BLOOD [[id number]] Collected:  08/13/18 1415    Order Status:  Canceled Specimen:  Blood           I have reviewed notes of prior 24hr. Other pertinent lab:       Total time spent with patient: 30 min                 Care Plan discussed with: Patient, Nursing Staff and >50% of time spent in counseling and coordination of care    Discussed:  Care Plan    Prophylaxis:  Lovenox    Disposition:  Home w/Family           ___________________________________________________    Attending Physician: Danny Arzola DO

## 2018-08-18 NOTE — ROUTINE PROCESS
Bedside and Verbal shift change report given to [facility] (oncoming nurse) by Joaquin Reynolds RN (offgoing nurse). Report included the following information SBAR, Kardex, MAR and Recent Results.

## 2018-08-18 NOTE — PROGRESS NOTES
Bedside and Verbal shift change report given to Evita Odell (oncoming nurse) by Oliver Villalobos RN (offgoing nurse). Report included the following information SBAR and Kardex.

## 2018-08-19 LAB
ANION GAP SERPL CALC-SCNC: 11 MMOL/L (ref 5–15)
BACTERIA SPEC CULT: ABNORMAL
BASOPHILS # BLD: 0 K/UL (ref 0–0.1)
BASOPHILS NFR BLD: 0 % (ref 0–1)
BUN SERPL-MCNC: 4 MG/DL (ref 6–20)
BUN/CREAT SERPL: 6 (ref 12–20)
CALCIUM SERPL-MCNC: 9.5 MG/DL (ref 8.5–10.1)
CHLORIDE SERPL-SCNC: 105 MMOL/L (ref 97–108)
CO2 SERPL-SCNC: 24 MMOL/L (ref 21–32)
COMMENT, HOLDF: NORMAL
CREAT SERPL-MCNC: 0.72 MG/DL (ref 0.55–1.02)
DIFFERENTIAL METHOD BLD: ABNORMAL
EOSINOPHIL # BLD: 0.1 K/UL (ref 0–0.4)
EOSINOPHIL NFR BLD: 1 % (ref 0–7)
ERYTHROCYTE [DISTWIDTH] IN BLOOD BY AUTOMATED COUNT: 13 % (ref 11.5–14.5)
GLUCOSE SERPL-MCNC: 120 MG/DL (ref 65–100)
HCT VFR BLD AUTO: 34.7 % (ref 35–47)
HGB BLD-MCNC: 11.8 G/DL (ref 11.5–16)
IMM GRANULOCYTES # BLD: 0.1 K/UL (ref 0–0.04)
IMM GRANULOCYTES NFR BLD AUTO: 0 % (ref 0–0.5)
LYMPHOCYTES # BLD: 2.6 K/UL (ref 0.8–3.5)
LYMPHOCYTES NFR BLD: 19 % (ref 12–49)
MAGNESIUM SERPL-MCNC: 2.2 MG/DL (ref 1.6–2.4)
MCH RBC QN AUTO: 30.6 PG (ref 26–34)
MCHC RBC AUTO-ENTMCNC: 34 G/DL (ref 30–36.5)
MCV RBC AUTO: 90.1 FL (ref 80–99)
MONOCYTES # BLD: 0.9 K/UL (ref 0–1)
MONOCYTES NFR BLD: 6 % (ref 5–13)
NEUTS SEG # BLD: 10.1 K/UL (ref 1.8–8)
NEUTS SEG NFR BLD: 73 % (ref 32–75)
NRBC # BLD: 0 K/UL (ref 0–0.01)
NRBC BLD-RTO: 0 PER 100 WBC
PLATELET # BLD AUTO: 398 K/UL (ref 150–400)
PMV BLD AUTO: 10.6 FL (ref 8.9–12.9)
POTASSIUM SERPL-SCNC: 3.1 MMOL/L (ref 3.5–5.1)
RBC # BLD AUTO: 3.85 M/UL (ref 3.8–5.2)
SAMPLES BEING HELD,HOLD: NORMAL
SERVICE CMNT-IMP: ABNORMAL
SODIUM SERPL-SCNC: 140 MMOL/L (ref 136–145)
TSH SERPL DL<=0.05 MIU/L-ACNC: 0.69 UIU/ML (ref 0.36–3.74)
WBC # BLD AUTO: 13.8 K/UL (ref 3.6–11)

## 2018-08-19 PROCEDURE — 65270000029 HC RM PRIVATE

## 2018-08-19 PROCEDURE — 84443 ASSAY THYROID STIM HORMONE: CPT | Performed by: INTERNAL MEDICINE

## 2018-08-19 PROCEDURE — 74011250637 HC RX REV CODE- 250/637: Performed by: INTERNAL MEDICINE

## 2018-08-19 PROCEDURE — 83735 ASSAY OF MAGNESIUM: CPT | Performed by: INTERNAL MEDICINE

## 2018-08-19 PROCEDURE — 85025 COMPLETE CBC W/AUTO DIFF WBC: CPT | Performed by: INTERNAL MEDICINE

## 2018-08-19 PROCEDURE — 36415 COLL VENOUS BLD VENIPUNCTURE: CPT | Performed by: INTERNAL MEDICINE

## 2018-08-19 PROCEDURE — 93005 ELECTROCARDIOGRAM TRACING: CPT

## 2018-08-19 PROCEDURE — 74011250636 HC RX REV CODE- 250/636: Performed by: INTERNAL MEDICINE

## 2018-08-19 PROCEDURE — 74011000258 HC RX REV CODE- 258: Performed by: INTERNAL MEDICINE

## 2018-08-19 PROCEDURE — 94760 N-INVAS EAR/PLS OXIMETRY 1: CPT

## 2018-08-19 PROCEDURE — 80048 BASIC METABOLIC PNL TOTAL CA: CPT | Performed by: INTERNAL MEDICINE

## 2018-08-19 PROCEDURE — 74011636637 HC RX REV CODE- 636/637: Performed by: PSYCHIATRY & NEUROLOGY

## 2018-08-19 RX ORDER — POTASSIUM CHLORIDE 750 MG/1
40 TABLET, FILM COATED, EXTENDED RELEASE ORAL
Status: COMPLETED | OUTPATIENT
Start: 2018-08-19 | End: 2018-08-19

## 2018-08-19 RX ORDER — METOPROLOL TARTRATE 25 MG/1
12.5 TABLET, FILM COATED ORAL EVERY 12 HOURS
Status: DISCONTINUED | OUTPATIENT
Start: 2018-08-19 | End: 2018-08-21 | Stop reason: HOSPADM

## 2018-08-19 RX ADMIN — PREDNISONE 20 MG: 20 TABLET ORAL at 08:33

## 2018-08-19 RX ADMIN — ENOXAPARIN SODIUM 40 MG: 40 INJECTION SUBCUTANEOUS at 20:24

## 2018-08-19 RX ADMIN — METRONIDAZOLE 500 MG: 500 INJECTION, SOLUTION INTRAVENOUS at 12:22

## 2018-08-19 RX ADMIN — CEFTRIAXONE SODIUM 2 G: 2 INJECTION, POWDER, FOR SOLUTION INTRAMUSCULAR; INTRAVENOUS at 21:40

## 2018-08-19 RX ADMIN — Medication 10 ML: at 20:28

## 2018-08-19 RX ADMIN — ACETAMINOPHEN 650 MG: 325 TABLET ORAL at 00:05

## 2018-08-19 RX ADMIN — SODIUM CHLORIDE AND POTASSIUM CHLORIDE: 9; 1.49 INJECTION, SOLUTION INTRAVENOUS at 10:35

## 2018-08-19 RX ADMIN — METRONIDAZOLE 500 MG: 500 INJECTION, SOLUTION INTRAVENOUS at 20:24

## 2018-08-19 RX ADMIN — HYDROCODONE BITARTRATE AND ACETAMINOPHEN 1 TABLET: 5; 325 TABLET ORAL at 22:46

## 2018-08-19 RX ADMIN — POTASSIUM CHLORIDE 40 MEQ: 750 TABLET, EXTENDED RELEASE ORAL at 22:44

## 2018-08-19 RX ADMIN — METRONIDAZOLE 500 MG: 500 INJECTION, SOLUTION INTRAVENOUS at 05:10

## 2018-08-19 RX ADMIN — METOPROLOL TARTRATE 12.5 MG: 25 TABLET ORAL at 20:23

## 2018-08-19 NOTE — PROGRESS NOTES
Chucho Palacios [facility] [address], [place], [address] [facility]  [phone]      Medical Progress Note      NAME: Bolivar Glaser   :  1950  MRM:  [id number]    Date/Time: 2018  9:19 AM       Assessment and Plan:   1. Bacteremia/Leukocytosis. not septic. Blood Cx on  and  with alpha-strep. Unclear source. Echo is unremarkable for vegetation. OBED: no endocarditis. LP is unremarkable for bacterial infection. CT of the abdomen and pelvis is unremarkable. CT head w/ contrastr nml. Colonoscopy normal. On ceftriaxone. ID consult appreciated. Repeat BC from  is negative so far. Plan is for WBC tagged scan on Monday to look for deep infection. Still awaiting final speciation from reference lab.     2. Intractable headache: Hx of migraine in the past. Head CT and MRI of the brain: negative. LP is unremarkable for bacterial infection. Neurology evaluation appreciated. Pain management. On steroid for a total of 5 days. Finally resolved with tylenol alone            Subjective:     Chief Complaint:  Patient seen and examined. Chart reviewed. Patient reports no fevers and headache has finally resolved    ROS:  (bold if positive, if negative)      Tolerating PT  Tolerating Diet        Objective:     Last 24hrs VS reviewed since prior progress note.  Most recent are:    Visit Vitals    /82 (BP 1 Location: Right arm, BP Patient Position: At rest)    Pulse 81    Temp 98.6 °F (37 °C)    Resp 18    Ht 5' 8\" (1.727 m)    Wt 97.1 kg (214 lb)    SpO2 98%    BMI 32.54 kg/m2     SpO2 Readings from Last 6 Encounters:   18 98%   08/10/18 97%   18 94%            Intake/Output Summary (Last 24 hours) at 18 0905  Last data filed at 18 0521   Gross per 24 hour   Intake              370 ml   Output             1250 ml   Net             -880 ml        Physical Exam:    Gen:  Well-developed, well-nourished, in no acute distress  HEENT:  Pink conjunctivae, PERRL, hearing intact to voice, moist mucous membranes  Neck:  Supple, without masses, thyroid non-tender  Resp:  No accessory muscle use, clear breath sounds without wheezes rales or rhonchi  Card:  No murmurs, normal S1, S2 without thrills, bruits or peripheral edema  Abd:  Soft, non-tender, non-distended, normoactive bowel sounds are present, no palpable organomegaly and no detectable hernias  Lymph:  No cervical or inguinal adenopathy  Musc:  No cyanosis or clubbing  Skin:  No rashes or ulcers, skin turgor is good  Neuro:  Cranial nerves are grossly intact, no focal motor weakness, follows commands appropriately  Psych:  Good insight, oriented to person, place and time, alert  __________________________________________________________________  Medications Reviewed: (see below)  Medications:     Current Facility-Administered Medications   Medication Dose Route Frequency    acetaminophen (TYLENOL) tablet 650 mg  650 mg Oral Q6H PRN    metroNIDAZOLE (FLAGYL) IVPB premix 500 mg  500 mg IntraVENous Q8H    predniSONE (DELTASONE) tablet 20 mg  20 mg Oral DAILY WITH BREAKFAST    cefTRIAXone (ROCEPHIN) 2 g in 0.9% sodium chloride (MBP/ADV) 50 mL  2 g IntraVENous Q24H    butalbital-acetaminophen-caffeine (FIORICET, ESGIC) -40 mg per tablet 1 Tab  1 Tab Oral Q4H PRN    0.9% sodium chloride with KCl 20 mEq/L infusion   IntraVENous CONTINUOUS    sodium chloride (NS) flush 5-10 mL  5-10 mL IntraVENous Q8H    sodium chloride (NS) flush 5-10 mL  5-10 mL IntraVENous PRN    HYDROcodone-acetaminophen (NORCO) 5-325 mg per tablet 1 Tab  1 Tab Oral Q4H PRN    HYDROmorphone (PF) (DILAUDID) injection 0.5 mg  0.5 mg IntraVENous Q4H PRN    naloxone (NARCAN) injection 0.4 mg  0.4 mg IntraVENous PRN    diphenhydrAMINE (BENADRYL) injection 12.5 mg  12.5 mg IntraVENous Q4H PRN    ondansetron (ZOFRAN) injection 4 mg  4 mg IntraVENous Q6H PRN    docusate sodium (COLACE) capsule 100 mg  100 mg Oral BID    enoxaparin (LOVENOX) injection 40 mg  40 mg SubCUTAneous Q24H        Lab Data Reviewed: (see below)  Lab Review:     Recent Labs      08/19/18   0645  08/18/18   0232  08/17/18   0136   WBC  13.8*  17.4*  20.3*   HGB  11.8  11.5  12.4   HCT  34.7*  34.2*  36.8   PLT  398  400  373     Recent Labs      08/17/18   0136   NA  138   K  3.7   CL  104   CO2  24   GLU  108*   BUN  7   CREA  0.76   CA  9.1     No results found for: GLUCPOC  No results for input(s): PH, PCO2, PO2, HCO3, FIO2 in the last 72 hours. No results for input(s): INR in the last 72 hours. No lab exists for component: Kenna Bre  All Micro Results     Procedure Component Value Units Date/Time    CULTURE, BLOOD [[id number]] Collected:  08/16/18 1107    Order Status:  Completed Specimen:  Blood from Blood Updated:  08/19/18 0811     Special Requests: NO SPECIAL REQUESTS        Culture result: NO GROWTH 3 DAYS       CULTURE, BLOOD [[id number]]  (Abnormal)  (Susceptibility) Collected:  08/13/18 1544    Order Status:  Completed Specimen:  Blood from Blood Updated:  08/19/18 2972     Special Requests: NO SPECIAL REQUESTS        Culture result:         ALPHA STREPTOCOCCUS, NOT S. PNEUMONIAE GROWING IN BOTH BOTTLES DRAWN SITE = L ARM (SENSITIVITIES PERFORMED BY E-TEST) . Angelique Jacobsen AN ISOLATE WILL BE SENT TO OUR REFERENCE LAB FOR IDENTIFICATION.  PLEASE REFER TO [id number], FOR SUBSEQUENT RESULTS (A)    CULTURE, CSF Afia Shall STAIN [[id number]] Collected:  08/14/18 1312    Order Status:  Completed Specimen:  Cerebrospinal Fluid Updated:  08/18/18 0927     Special Requests: NO SPECIAL REQUESTS        GRAM STAIN RARE WBCS SEEN         NO ORGANISMS SEEN                 Smear Reviewed by Microbiology     Culture result:         Culture performed on Unspun Fluid              NO GROWTH ON SOLID MEDIA AT 4 DAYS              NO GROWTH THUS FAR IN THIO BROTH, HOLDING 7 DAYS    ORGANISM ID BY MALDI [[id number]] Collected:  08/13/18 1544    Order Status:  Completed Specimen:  MICROBIAL ISOLATE Updated:  08/17/18 1940     Source BLOOD     Organism ID by Carolina Boyle Close Comment         (NOTE)  Specimen has been received. Testing has been initiated. Performed At: [address] [id number]  Anda Aschoff MD BR:[id number]         CULTURE, URINE [[id number]] Collected:  08/13/18 1447    Order Status:  Completed Specimen:  Urine from Clean catch Updated:  08/15/18 0748     Special Requests: NO SPECIAL REQUESTS        [place] Count --        <10,000  COLONIES/mL       Culture result: NO SIGNIFICANT GROWTH       MENINGITIS PATHOGENS PANEL, CSF (BY PCR) [[id number]] Collected:  08/14/18 1700    Order Status:  Completed Specimen:  Cerebrospinal Fluid from Cerebrospinal Fluid Updated:  08/14/18 1904     Escherichia coli K1 NOT DETECTED        Haemophilus Influenzae NOT DETECTED        Listeria Monocytogenes NOT DETECTED        Neisseria Meningitidis NOT DETECTED        Streptococcus Agalactiae NOT DETECTED        Streptococcus Pneumoniae NOT DETECTED        Cytomegalovirus NOT DETECTED        Enterovirus NOT DETECTED        Herpes Simplex Virus 1 NOT DETECTED        Herpes Simplex Virus 2 NOT DETECTED        Human Herpesvirus 6 NOT DETECTED        Human Parechovirus NOT DETECTED        Varicella Zoster Virus NOT DETECTED        Crypto. neoformans/gattii NOT DETECTED       URINE CULTURE HOLD SAMPLE [[id number]] Collected:  08/13/18 1447    Order Status:  Completed Specimen:  Urine from Serum Updated:  08/13/18 1449     Urine culture hold         URINE ON HOLD IN MICROBIOLOGY DEPT FOR 3 DAYS. IF UNPRESERVED URINE IS SUBMITTED, IT CANNOT BE USED FOR ADDITIONAL TESTING AFTER 24 HRS, RECOLLECTION WILL BE REQUIRED. CULTURE, BLOOD [[id number]] Collected:  08/13/18 1415    Order Status:  Canceled Specimen:  Blood           I have reviewed notes of prior 24hr. Other pertinent lab:       Total time spent with patient: 25 min                 Care Plan discussed with: Patient, Nursing Staff and >50% of time spent in counseling and coordination of care    Discussed:  Care Plan    Prophylaxis:  Lovenox    Disposition:  Home w/Family           ___________________________________________________    Attending Physician: Aramis Maldonado DO

## 2018-08-19 NOTE — PROGRESS NOTES
Bedside shift change report given to Linh Tate RN (oncoming nurse) by Janee Lombardi RN (offgoing nurse). Report included the following information SBAR and Kardex.

## 2018-08-19 NOTE — PROGRESS NOTES
Notified Dr Pearl Spencer of pt irregular HR fluctuating between 105-135, EKG done. MD orders to follow. 2023 - Metoprolol given and labs drawn. 2144 - HR still irregular and fluctuating between 80-95. Dr Pearl Spencer notified of results.

## 2018-08-19 NOTE — PROGRESS NOTES
Bedside and Verbal shift change report given to Marivel Benedict (oncoming nurse) by Tyron Weiss (offgoing nurse). Report included the following information SBAR, Kardex, Intake/Output, MAR and Accordion.

## 2018-08-20 ENCOUNTER — APPOINTMENT (OUTPATIENT)
Dept: NUCLEAR MEDICINE | Age: 68
DRG: 872 | End: 2018-08-20
Attending: INTERNAL MEDICINE
Payer: COMMERCIAL

## 2018-08-20 LAB
ANION GAP SERPL CALC-SCNC: 10 MMOL/L (ref 5–15)
ATRIAL RATE: 131 BPM
ATRIAL RATE: 61 BPM
BASOPHILS # BLD: 0 K/UL (ref 0–0.1)
BASOPHILS NFR BLD: 0 % (ref 0–1)
BUN SERPL-MCNC: 4 MG/DL (ref 6–20)
BUN/CREAT SERPL: 5 (ref 12–20)
CALCIUM SERPL-MCNC: 9.8 MG/DL (ref 8.5–10.1)
CALCULATED P AXIS, ECG09: 61 DEGREES
CALCULATED R AXIS, ECG10: -21 DEGREES
CALCULATED R AXIS, ECG10: -26 DEGREES
CALCULATED T AXIS, ECG11: 31 DEGREES
CALCULATED T AXIS, ECG11: 75 DEGREES
CHLORIDE SERPL-SCNC: 107 MMOL/L (ref 97–108)
CO2 SERPL-SCNC: 25 MMOL/L (ref 21–32)
CREAT SERPL-MCNC: 0.84 MG/DL (ref 0.55–1.02)
DIAGNOSIS, 93000: NORMAL
DIAGNOSIS, 93000: NORMAL
DIFFERENTIAL METHOD BLD: ABNORMAL
EOSINOPHIL # BLD: 0.1 K/UL (ref 0–0.4)
EOSINOPHIL NFR BLD: 0 % (ref 0–7)
ERYTHROCYTE [DISTWIDTH] IN BLOOD BY AUTOMATED COUNT: 13.2 % (ref 11.5–14.5)
GLUCOSE SERPL-MCNC: 101 MG/DL (ref 65–100)
HCT VFR BLD AUTO: 38.3 % (ref 35–47)
HGB BLD-MCNC: 13 G/DL (ref 11.5–16)
IMM GRANULOCYTES # BLD: 0.1 K/UL (ref 0–0.04)
IMM GRANULOCYTES NFR BLD AUTO: 1 % (ref 0–0.5)
LYMPHOCYTES # BLD: 3.5 K/UL (ref 0.8–3.5)
LYMPHOCYTES NFR BLD: 22 % (ref 12–49)
MAGNESIUM SERPL-MCNC: 2.4 MG/DL (ref 1.6–2.4)
MCH RBC QN AUTO: 30.6 PG (ref 26–34)
MCHC RBC AUTO-ENTMCNC: 33.9 G/DL (ref 30–36.5)
MCV RBC AUTO: 90.1 FL (ref 80–99)
MONOCYTES # BLD: 0.8 K/UL (ref 0–1)
MONOCYTES NFR BLD: 5 % (ref 5–13)
NEUTS SEG # BLD: 11.2 K/UL (ref 1.8–8)
NEUTS SEG NFR BLD: 71 % (ref 32–75)
NRBC # BLD: 0 K/UL (ref 0–0.01)
NRBC BLD-RTO: 0 PER 100 WBC
ORGANISM ID BY MALDI, ORJ1T: NORMAL
P-R INTERVAL, ECG05: 156 MS
PLATELET # BLD AUTO: 494 K/UL (ref 150–400)
PMV BLD AUTO: 10.4 FL (ref 8.9–12.9)
POTASSIUM SERPL-SCNC: 3.9 MMOL/L (ref 3.5–5.1)
Q-T INTERVAL, ECG07: 358 MS
Q-T INTERVAL, ECG07: 404 MS
QRS DURATION, ECG06: 78 MS
QRS DURATION, ECG06: 82 MS
QTC CALCULATION (BEZET), ECG08: 406 MS
QTC CALCULATION (BEZET), ECG08: 497 MS
RBC # BLD AUTO: 4.25 M/UL (ref 3.8–5.2)
REAGIN AB CSF QL: NON REACTIVE
SODIUM SERPL-SCNC: 142 MMOL/L (ref 136–145)
SOURCE, RSRC67: NORMAL
T4 FREE SERPL-MCNC: 1.2 NG/DL (ref 0.8–1.5)
TSH SERPL DL<=0.05 MIU/L-ACNC: 1.55 UIU/ML (ref 0.36–3.74)
VENTRICULAR RATE, ECG03: 116 BPM
VENTRICULAR RATE, ECG03: 61 BPM
WBC # BLD AUTO: 15.7 K/UL (ref 3.6–11)

## 2018-08-20 PROCEDURE — 84439 ASSAY OF FREE THYROXINE: CPT | Performed by: INTERNAL MEDICINE

## 2018-08-20 PROCEDURE — 83735 ASSAY OF MAGNESIUM: CPT | Performed by: INTERNAL MEDICINE

## 2018-08-20 PROCEDURE — 94760 N-INVAS EAR/PLS OXIMETRY 1: CPT

## 2018-08-20 PROCEDURE — 93005 ELECTROCARDIOGRAM TRACING: CPT

## 2018-08-20 PROCEDURE — 85025 COMPLETE CBC W/AUTO DIFF WBC: CPT | Performed by: INTERNAL MEDICINE

## 2018-08-20 PROCEDURE — 74011250636 HC RX REV CODE- 250/636: Performed by: INTERNAL MEDICINE

## 2018-08-20 PROCEDURE — 74011000258 HC RX REV CODE- 258: Performed by: INTERNAL MEDICINE

## 2018-08-20 PROCEDURE — 65660000000 HC RM CCU STEPDOWN

## 2018-08-20 PROCEDURE — 74011250637 HC RX REV CODE- 250/637: Performed by: INTERNAL MEDICINE

## 2018-08-20 PROCEDURE — 36415 COLL VENOUS BLD VENIPUNCTURE: CPT | Performed by: INTERNAL MEDICINE

## 2018-08-20 PROCEDURE — 84443 ASSAY THYROID STIM HORMONE: CPT | Performed by: INTERNAL MEDICINE

## 2018-08-20 PROCEDURE — A9521 TC99M EXAMETAZIME: HCPCS

## 2018-08-20 PROCEDURE — 80048 BASIC METABOLIC PNL TOTAL CA: CPT | Performed by: INTERNAL MEDICINE

## 2018-08-20 RX ORDER — METRONIDAZOLE 250 MG/1
500 TABLET ORAL 3 TIMES DAILY
Status: DISCONTINUED | OUTPATIENT
Start: 2018-08-20 | End: 2018-08-21 | Stop reason: HOSPADM

## 2018-08-20 RX ORDER — HEPARIN SODIUM 1000 [USP'U]/ML
INJECTION, SOLUTION INTRAVENOUS; SUBCUTANEOUS
Status: DISPENSED
Start: 2018-08-20 | End: 2018-08-20

## 2018-08-20 RX ORDER — HEPARIN SODIUM 1000 [USP'U]/ML
2000 INJECTION, SOLUTION INTRAVENOUS; SUBCUTANEOUS ONCE
Status: COMPLETED | OUTPATIENT
Start: 2018-08-20 | End: 2018-08-20

## 2018-08-20 RX ADMIN — CEFTRIAXONE SODIUM 2 G: 2 INJECTION, POWDER, FOR SOLUTION INTRAMUSCULAR; INTRAVENOUS at 21:28

## 2018-08-20 RX ADMIN — METRONIDAZOLE 500 MG: 250 TABLET ORAL at 22:48

## 2018-08-20 RX ADMIN — Medication 10 ML: at 21:28

## 2018-08-20 RX ADMIN — METOPROLOL TARTRATE 12.5 MG: 25 TABLET ORAL at 09:44

## 2018-08-20 RX ADMIN — METRONIDAZOLE 500 MG: 500 INJECTION, SOLUTION INTRAVENOUS at 11:31

## 2018-08-20 RX ADMIN — METRONIDAZOLE 500 MG: 500 INJECTION, SOLUTION INTRAVENOUS at 04:00

## 2018-08-20 RX ADMIN — SODIUM CHLORIDE AND POTASSIUM CHLORIDE: 9; 1.49 INJECTION, SOLUTION INTRAVENOUS at 02:19

## 2018-08-20 RX ADMIN — Medication 10 ML: at 05:56

## 2018-08-20 RX ADMIN — HEPARIN SODIUM 2000 UNITS: 1000 INJECTION, SOLUTION INTRAVENOUS; SUBCUTANEOUS at 10:55

## 2018-08-20 RX ADMIN — METOPROLOL TARTRATE 12.5 MG: 25 TABLET ORAL at 21:37

## 2018-08-20 RX ADMIN — Medication 10 ML: at 14:29

## 2018-08-20 RX ADMIN — ENOXAPARIN SODIUM 40 MG: 40 INJECTION SUBCUTANEOUS at 21:28

## 2018-08-20 NOTE — PROGRESS NOTES
Bedside and Verbal shift change report given to Ayden Arreguin (oncoming nurse) by Roberta Sanchez (offgoing nurse). Report included the following information SBAR, Kardex, Intake/Output, MAR, Accordion and Recent Results.

## 2018-08-20 NOTE — PROGRESS NOTES
Gastrointestinal Progress Note    8/20/2018    Admit Date: 8/13/2018    Subjective:  Feels much better     New Complaints Today:  No: no abdominal symptoms      Current Facility-Administered Medications   Medication Dose Route Frequency    heparin (porcine) 1,000 unit/mL injection 2,000 Units  2,000 Units IntraVENous ONCE    metoprolol tartrate (LOPRESSOR) tablet 12.5 mg  12.5 mg Oral Q12H    acetaminophen (TYLENOL) tablet 650 mg  650 mg Oral Q6H PRN    metroNIDAZOLE (FLAGYL) IVPB premix 500 mg  500 mg IntraVENous Q8H    cefTRIAXone (ROCEPHIN) 2 g in 0.9% sodium chloride (MBP/ADV) 50 mL  2 g IntraVENous Q24H    butalbital-acetaminophen-caffeine (FIORICET, ESGIC) -40 mg per tablet 1 Tab  1 Tab Oral Q4H PRN    sodium chloride (NS) flush 5-10 mL  5-10 mL IntraVENous Q8H    sodium chloride (NS) flush 5-10 mL  5-10 mL IntraVENous PRN    HYDROcodone-acetaminophen (NORCO) 5-325 mg per tablet 1 Tab  1 Tab Oral Q4H PRN    HYDROmorphone (PF) (DILAUDID) injection 0.5 mg  0.5 mg IntraVENous Q4H PRN    naloxone (NARCAN) injection 0.4 mg  0.4 mg IntraVENous PRN    diphenhydrAMINE (BENADRYL) injection 12.5 mg  12.5 mg IntraVENous Q4H PRN    ondansetron (ZOFRAN) injection 4 mg  4 mg IntraVENous Q6H PRN    docusate sodium (COLACE) capsule 100 mg  100 mg Oral BID    enoxaparin (LOVENOX) injection 40 mg  40 mg SubCUTAneous Q24H        Objective:     Blood pressure 132/84, pulse 81, temperature 97.8 °F (36.6 °C), resp. rate 17, height 5' 8\" (1.727 m), weight 97.1 kg (214 lb), SpO2 100 %.          08/18 1901 - 08/20 0700  In: 150 [I.V.:150]  Out: 400 [Urine:400]    EXAM:  GENERAL: alert, no distress, HEART: regular rate and rhythm, LUNGS: chest clear, no wheezing, rales, normal symmetric air entry, ABDOMEN:  Bowel sounds are normal, liver is not enlarged, spleen is not enlarged and EXTREMITY: no edema      Data Review    Recent Results (from the past 24 hour(s))   METABOLIC PANEL, BASIC    Collection Time: 08/19/18 8:06 PM   Result Value Ref Range    Sodium 140 136 - 145 mmol/L    Potassium 3.1 (L) 3.5 - 5.1 mmol/L    Chloride 105 97 - 108 mmol/L    CO2 24 21 - 32 mmol/L    Anion gap 11 5 - 15 mmol/L    Glucose 120 (H) 65 - 100 mg/dL    BUN 4 (L) 6 - 20 MG/DL    Creatinine 0.72 0.55 - 1.02 MG/DL    BUN/Creatinine ratio 6 (L) 12 - 20      GFR est AA >60 >60 ml/min/1.73m2    GFR est non-AA >60 >60 ml/min/1.73m2    Calcium 9.5 8.5 - 10.1 MG/DL   MAGNESIUM    Collection Time: 08/19/18  8:06 PM   Result Value Ref Range    Magnesium 2.2 1.6 - 2.4 mg/dL   SAMPLES BEING HELD    Collection Time: 08/19/18  8:06 PM   Result Value Ref Range    SAMPLES BEING HELD GOLD     COMMENT        Add-on orders for these samples will be processed based on acceptable specimen integrity and analyte stability, which may vary by analyte. TSH 3RD GENERATION    Collection Time: 08/19/18  8:06 PM   Result Value Ref Range    TSH 0.69 0.36 - 3.74 uIU/mL   TSH 3RD GENERATION    Collection Time: 08/20/18  3:27 AM   Result Value Ref Range    TSH 1.55 0.36 - 3.74 uIU/mL   CBC WITH AUTOMATED DIFF    Collection Time: 08/20/18  3:27 AM   Result Value Ref Range    WBC 15.7 (H) 3.6 - 11.0 K/uL    RBC 4.25 3.80 - 5.20 M/uL    HGB 13.0 11.5 - 16.0 g/dL    HCT 38.3 35.0 - 47.0 %    MCV 90.1 80.0 - 99.0 FL    MCH 30.6 26.0 - 34.0 PG    MCHC 33.9 30.0 - 36.5 g/dL    RDW 13.2 11.5 - 14.5 %    PLATELET 313 (H) 868 - 400 K/uL    MPV 10.4 8.9 - 12.9 FL    NRBC 0.0 0  WBC    ABSOLUTE NRBC 0.00 0.00 - 0.01 K/uL    NEUTROPHILS 71 32 - 75 %    LYMPHOCYTES 22 12 - 49 %    MONOCYTES 5 5 - 13 %    EOSINOPHILS 0 0 - 7 %    BASOPHILS 0 0 - 1 %    IMMATURE GRANULOCYTES 1 (H) 0.0 - 0.5 %    ABS. NEUTROPHILS 11.2 (H) 1.8 - 8.0 K/UL    ABS. LYMPHOCYTES 3.5 0.8 - 3.5 K/UL    ABS. MONOCYTES 0.8 0.0 - 1.0 K/UL    ABS. EOSINOPHILS 0.1 0.0 - 0.4 K/UL    ABS. BASOPHILS 0.0 0.0 - 0.1 K/UL    ABS. IMM.  GRANS. 0.1 (H) 0.00 - 0.04 K/UL    DF AUTOMATED     METABOLIC PANEL, BASIC Collection Time: 08/20/18  3:27 AM   Result Value Ref Range    Sodium 142 136 - 145 mmol/L    Potassium 3.9 3.5 - 5.1 mmol/L    Chloride 107 97 - 108 mmol/L    CO2 25 21 - 32 mmol/L    Anion gap 10 5 - 15 mmol/L    Glucose 101 (H) 65 - 100 mg/dL    BUN 4 (L) 6 - 20 MG/DL    Creatinine 0.84 0.55 - 1.02 MG/DL    BUN/Creatinine ratio 5 (L) 12 - 20      GFR est AA >60 >60 ml/min/1.73m2    GFR est non-AA >60 >60 ml/min/1.73m2    Calcium 9.8 8.5 - 10.1 MG/DL   MAGNESIUM    Collection Time: 08/20/18  3:27 AM   Result Value Ref Range    Magnesium 2.4 1.6 - 2.4 mg/dL   T4, FREE    Collection Time: 08/20/18  3:27 AM   Result Value Ref Range    T4, Free 1.2 0.8 - 1.5 NG/DL       Assessment:     Principal Problem:    Bacteremia (8/13/2018)    Active Problems:    Leukocytosis (leucocytosis) (8/13/2018)      Intractable headache (8/13/2018)      UTI (urinary tract infection) (8/13/2018)        Plan:     1.   Will see again as needed

## 2018-08-20 NOTE — PROGRESS NOTES
Bedside and Verbal shift change report given to Larry Rincon (oncoming nurse) by Ferdinand Garrett (offgoing nurse). Report included the following information SBAR, Kardex, MAR, Accordion and Recent Results.

## 2018-08-20 NOTE — PROGRESS NOTES
[facility] [address]  [address] [facility], [place], [address] [place] [address]  [phone]      Medical Progress Note      NAME: Daniel Velazquez   :  1950  MRM:  [id number]    Date/Time: 2018  9:19 AM       Assessment and Plan:   1. Bacteremia/Leukocytosis. not septic. Blood Cx on  and  with alpha-strep. Unclear source. Echo is unremarkable for vegetation. OBED: no endocarditis. LP is unremarkable for bacterial infection. CT of the abdomen and pelvis is unremarkable. CT head w/ contrastr nml. Colonoscopy normal. On ceftriaxone. ID consult appreciated. Repeat BC from  is negative so far. Plan is for WBC tagged scan to look for deep nidus, ultimately will need PICC and 4-6 wks of IV CTX     2. Intractable headache: Hx of migraine in the past. Head CT and MRI of the brain: negative. LP is unremarkable for bacterial infection. Neurology evaluation appreciated. Pain management. On steroid for a total of 5 days. Finally resolved with tylenol alone    3. Atrial fibrillation. Appears this may be a new diagnosis? Occurred overnight. TSH and lytes normal. Echo reviewed with mild LA dilation. Started on metoprolol. CHADS-VASc score of 2 (age and sex), will ask cardiology to see and comment on anticoag            Subjective:     Chief Complaint:  Patient seen and examined. Chart reviewed. Patient reports no fevers or headache    ROS:  (bold if positive, if negative)      Tolerating PT  Tolerating Diet        Objective:     Last 24hrs VS reviewed since prior progress note.  Most recent are:    Visit Vitals    /84 (BP 1 Location: Left arm, BP Patient Position: Sitting)    Pulse 81    Temp 97.8 °F (36.6 °C)    Resp 17    Ht 5' 8\" (1.727 m)    Wt 97.1 kg (214 lb)    SpO2 100%    BMI 32.54 kg/m2     SpO2 Readings from Last 6 Encounters:   18 100%   08/10/18 97%   18 94%            Intake/Output Summary (Last 24 hours) at 18 1033  Last data filed at 18 2140 Gross per 24 hour   Intake              150 ml   Output                0 ml   Net              150 ml        Physical Exam:    Gen:  Well-developed, well-nourished, in no acute distress  HEENT:  Pink conjunctivae, PERRL, hearing intact to voice, moist mucous membranes  Neck:  Supple, without masses, thyroid non-tender  Resp:  No accessory muscle use, clear breath sounds without wheezes rales or rhonchi  Card:  No murmurs, normal S1, S2 without thrills, bruits or peripheral edema  Abd:  Soft, non-tender, non-distended, normoactive bowel sounds are present, no palpable organomegaly and no detectable hernias  Lymph:  No cervical or inguinal adenopathy  Musc:  No cyanosis or clubbing  Skin:  No rashes or ulcers, skin turgor is good  Neuro:  Cranial nerves are grossly intact, no focal motor weakness, follows commands appropriately  Psych:  Good insight, oriented to person, place and time, alert  __________________________________________________________________  Medications Reviewed: (see below)  Medications:     Current Facility-Administered Medications   Medication Dose Route Frequency    heparin (porcine) 1,000 unit/mL injection 2,000 Units  2,000 Units IntraVENous ONCE    heparin (porcine) 1,000 unit/mL injection        metoprolol tartrate (LOPRESSOR) tablet 12.5 mg  12.5 mg Oral Q12H    acetaminophen (TYLENOL) tablet 650 mg  650 mg Oral Q6H PRN    metroNIDAZOLE (FLAGYL) IVPB premix 500 mg  500 mg IntraVENous Q8H    cefTRIAXone (ROCEPHIN) 2 g in 0.9% sodium chloride (MBP/ADV) 50 mL  2 g IntraVENous Q24H    butalbital-acetaminophen-caffeine (FIORICET, ESGIC) -40 mg per tablet 1 Tab  1 Tab Oral Q4H PRN    sodium chloride (NS) flush 5-10 mL  5-10 mL IntraVENous Q8H    sodium chloride (NS) flush 5-10 mL  5-10 mL IntraVENous PRN    HYDROcodone-acetaminophen (NORCO) 5-325 mg per tablet 1 Tab  1 Tab Oral Q4H PRN    HYDROmorphone (PF) (DILAUDID) injection 0.5 mg  0.5 mg IntraVENous Q4H PRN    naloxone [facility]) injection 0.4 mg  0.4 mg IntraVENous PRN    diphenhydrAMINE (BENADRYL) injection 12.5 mg  12.5 mg IntraVENous Q4H PRN    ondansetron (ZOFRAN) injection 4 mg  4 mg IntraVENous Q6H PRN    docusate sodium (COLACE) capsule 100 mg  100 mg Oral BID    enoxaparin (LOVENOX) injection 40 mg  40 mg SubCUTAneous Q24H        Lab Data Reviewed: (see below)  Lab Review:     Recent Labs      08/20/18 0327 08/19/18   0645  08/18/18 0232   WBC  15.7*  13.8*  17.4*   HGB  13.0  11.8  11.5   HCT  38.3  34.7*  34.2*   PLT  494*  398  400     Recent Labs      08/20/18 0327 08/19/18 2006   NA  142  140   K  3.9  3.1*   CL  107  105   CO2  25  24   GLU  101*  120*   BUN  4*  4*   CREA  0.84  0.72   CA  9.8  9.5   MG  2.4  2.2     No results found for: GLUCPOC  No results for input(s): PH, PCO2, PO2, HCO3, FIO2 in the last 72 hours. No results for input(s): INR in the last 72 hours. No lab exists for component: Dhiraj Mercury  All Micro Results     Procedure Component Value Units Date/Time    CULTURE, BLOOD [[id number]] Collected:  08/16/18 1107    Order Status:  Completed Specimen:  Blood from Blood Updated:  08/20/18 0703     Special Requests: NO SPECIAL REQUESTS        Culture result: NO GROWTH 4 DAYS       CULTURE, BLOOD [[id number]]  (Abnormal)  (Susceptibility) Collected:  08/13/18 1544    Order Status:  Completed Specimen:  Blood from Blood Updated:  08/19/18 9938     Special Requests: NO SPECIAL REQUESTS        Culture result:         ALPHA STREPTOCOCCUS, NOT S. PNEUMONIAE GROWING IN BOTH BOTTLES DRAWN SITE = L ARM (SENSITIVITIES PERFORMED BY E-TEST) . Vadim Kahn AN ISOLATE WILL BE SENT TO OUR REFERENCE LAB FOR IDENTIFICATION.  PLEASE REFER TO [id number], FOR SUBSEQUENT RESULTS (A)    CULTURE, CSF Jem Freud STAIN [[id number]] Collected:  08/14/18 1312    Order Status:  Completed Specimen:  Cerebrospinal Fluid Updated:  08/18/18 0927     Special Requests: NO SPECIAL REQUESTS        GRAM STAIN RARE WBCS SEEN         NO ORGANISMS SEEN                 Smear Reviewed by Microbiology     Culture result:         Culture performed on Unspun Fluid              NO GROWTH ON SOLID MEDIA AT 4 DAYS              NO GROWTH THUS FAR IN THIO BROTH, HOLDING 7 DAYS    ORGANISM ID BY MALDI [[id number]] Collected:  08/13/18 1544    Order Status:  Completed Specimen:  MICROBIAL ISOLATE Updated:  08/17/18 1940     Source BLOOD     Organism ID by Carolina Boyle Close Comment         (NOTE)  Specimen has been received. Testing has been initiated. Performed At: [address] [id number]  Dennie Mas MD WQ:[id number]         CULTURE, URINE [[id number]] Collected:  08/13/18 1447    Order Status:  Completed Specimen:  Urine from Clean catch Updated:  08/15/18 0748     Special Requests: NO SPECIAL REQUESTS        [place] Count --        <10,000  COLONIES/mL       Culture result: NO SIGNIFICANT GROWTH       MENINGITIS PATHOGENS PANEL, CSF (BY PCR) [[id number]] Collected:  08/14/18 1700    Order Status:  Completed Specimen:  Cerebrospinal Fluid from Cerebrospinal Fluid Updated:  08/14/18 1904     Escherichia coli K1 NOT DETECTED        Haemophilus Influenzae NOT DETECTED        Listeria Monocytogenes NOT DETECTED        Neisseria Meningitidis NOT DETECTED        Streptococcus Agalactiae NOT DETECTED        Streptococcus Pneumoniae NOT DETECTED        Cytomegalovirus NOT DETECTED        Enterovirus NOT DETECTED        Herpes Simplex Virus 1 NOT DETECTED        Herpes Simplex Virus 2 NOT DETECTED        Human Herpesvirus 6 NOT DETECTED        Human Parechovirus NOT DETECTED        Varicella Zoster Virus NOT DETECTED        Crypto. neoformans/gattii NOT DETECTED       URINE CULTURE HOLD SAMPLE [[id number]] Collected:  08/13/18 1447    Order Status:  Completed Specimen:  Urine from Serum Updated:  08/13/18 1449     Urine culture hold         URINE ON HOLD IN MICROBIOLOGY DEPT FOR 3 DAYS.  IF UNPRESERVED URINE IS SUBMITTED, IT CANNOT BE USED FOR ADDITIONAL TESTING AFTER 24 HRS, RECOLLECTION WILL BE REQUIRED. CULTURE, BLOOD [[id number]] Collected:  08/13/18 1415    Order Status:  Canceled Specimen:  Blood           I have reviewed notes of prior 24hr. Other pertinent lab:       Total time spent with patient: 30 min                 Care Plan discussed with: Patient, Nursing Staff, Consultant/Specialist and >50% of time spent in counseling and coordination of care    Discussed:  Care Plan and D/C Planning    Prophylaxis:  Lovenox    Disposition:  Home w/Family           ___________________________________________________    Attending Physician: Racquel Page DO

## 2018-08-20 NOTE — PROGRESS NOTES
Consult noted to price out Eliquis 5 mg BID vs Xarelto 20 mg qd. Contacted pt's pharmacy- [facility] on [address]. Pharmacist priced out both medications at $30.  Notified Charmayne Davidson, NP. Janey Gamble LCSW    Met with pt re: IV abx. Choice offered for provider. She selected [facility]. [facility]. Janey Gamble LCSW    1:48pm:  A preliminary referral was sent to [facility] (HCP). Pt is covered at 100% and HCP is able to provide IV nursing if needed. Janey Gamble LCSW    3pm:  Pt does not have a PCP. CM specialist was notified, and pt was provided information on [facility].   Janey Gamble LCSW

## 2018-08-20 NOTE — DISCHARGE SUMMARY
Physician Interim Summary   Patient ID:  Bolivar Glaser  [id number]  76 y.o.  1950    PCP: None     Consults: Cardiology, ID, GI and Neurology    Covering dates: 8/13/2018 through 8/20/2018    Admission Diagnoses: Sepsis [facility])  [address] Course:   Principal Problem:    Bacteremia (8/13/2018)    Active Problems:    Leukocytosis (leucocytosis) (8/13/2018)      Intractable headache (8/13/2018)      UTI (urinary tract infection) (8/13/2018)      Ms. King is a 75 yo F admitted with Alpha-strep bacteremia. Blood Cx on 08/07 and 8/13 with alpha-strep, sent to reference lab for final speciation. Repeat Cx from 8/16 are NGTD. Unclear source. Echo is unremarkable for vegetation. OBED: no endocarditis. LP is unremarkable for bacterial infection. CT of the abdomen and pelvis is unremarkable. CT head w/ contrastr nml. Colonoscopy normal. Plan for WBC tagged scan today. On ceftriaxone. ID consult appreciated. Ultimately will need PICC and 4-6 wks of IV CTX      Additional hospital course and discharge summary will be done by discharging physician.

## 2018-08-20 NOTE — ADVANCED PRACTICE NURSE
CM priced OAC's through pts insurance. Recommend Eliquis 5 mg BID at discharge. 30$ per month.     Follow-up Information     Follow up With Details Comments Contact Info    Colleen Rojas NP In 2 weeks  [facility] [address]  Kain [address]ling [address]      Cliff Martinez MD On 9/13/2018 3 pm  [address] [phone]

## 2018-08-20 NOTE — PROGRESS NOTES
Cardiology Progress Note       5th floor   NAME:  Gerhardt Ivan   :   1950   MRN:   [id number]     Assessment/Plan:   1. New onset a fib: noted yesterday. EKG confirmed. Rate stable with low dose BB. CHADs 2 Vasc score is 2. She has a 2.2 % risk per yr of embolic event without anticoagulation. Recommend [address] at discharge. Lovenox for now. Monitor on tele. Ck EKG. Will have case mgt price eliquis vs xarelto. Pt agreeable. Will arrange OP follow up.   2. Bacteremia/leucocytosis: unclear source. 3. Headache: Brain MRI/head CT negative. Now resolved. Has follow up in our office 18     Pt personally seen and examined. Chart reviewed. Agree with advanced NP's history, exam and  A/P with changes/additons. New onset Afib - will need [address]. Rate controlled. ECHO - nml EF      Discussed with patient/nursing    Perfecto Tavares MD, [facility]        Subjective:   Cardiology asked to resee for newly discovered a fib. Pt asymptomatic. Cardiac ROS: Patient denies any exertional chest pain, dyspnea, palpitations, syncope, orthopnea, edema or paroxysmal nocturnal dyspnea. Previous Cardiac Eval  Echo 2018 LVEF 55 % to 60 %. No WMA. LAE. Mild TR  OBED: 8/15/18: No thrombus, no vegetation      Review of Systems: No nausea, indigestion, vomiting, pain, cough, sputum. No bleeding. Taking po. Appetite ok. Objective:     Visit Vitals    /84 (BP 1 Location: Left arm, BP Patient Position: Sitting)    Pulse 81    Temp 97.8 °F (36.6 °C)    Resp 17    Ht 5' 8\" (1.727 m)    Wt 214 lb (97.1 kg)    SpO2 100%    BMI 32.54 kg/m2      O2 Device: Room air    Temp (24hrs), Av.2 °F (36.8 °C), Min:97.8 °F (36.6 °C), Max:98.7 °F (37.1 °C)            1901 -  0700  In: 150 [I.V.:150]  Out: 400 [Urine:400]  TELE: off     General: AAOx3 cooperative, no acute distress. HEENT: Atraumatic. Pink and moist.    Neck : Supple  Lungs: CTA bilaterally.  No wheezing/rhonchi/rales. Heart: irregular rhythm, no murmur. No JVD. Abdomen: Soft, non-distended, non-tender. + Bowel sounds. Extremities: No edema. Neurologic: Grossly intact. Alert and oriented X 3. No acute neurological distress. Psych: Good insight. Not anxious or agitated. Care Plan discussed with:    Comments   Patient x    Family      RN x    Care Manager                    Consultant:  shannan attending       Data Review:     No lab exists for component: ITNL   No results for input(s): CPK, CKMB, TROIQ in the last 72 hours. Recent Labs      08/20/18   0327  08/19/18 2006 08/19/18   0645  08/18/18   0232   NA  142  140   --    --    K  3.9  3.1*   --    --    CL  107  105   --    --    CO2  25  24   --    --    BUN  4*  4*   --    --    CREA  0.84  0.72   --    --    GLU  101*  120*   --    --    MG  2.4  2.2   --    --    WBC  15.7*   --   13.8*  17.4*   HGB  13.0   --   11.8  11.5   HCT  38.3   --   34.7*  34.2*   PLT  494*   --   398  400     No results for input(s): INR, PTP, APTT in the last 72 hours.     No lab exists for component: INREXT    Medications reviewed  Current Facility-Administered Medications   Medication Dose Route Frequency    heparin (porcine) 1,000 unit/mL injection 2,000 Units  2,000 Units IntraVENous ONCE    metoprolol tartrate (LOPRESSOR) tablet 12.5 mg  12.5 mg Oral Q12H    acetaminophen (TYLENOL) tablet 650 mg  650 mg Oral Q6H PRN    metroNIDAZOLE (FLAGYL) IVPB premix 500 mg  500 mg IntraVENous Q8H    cefTRIAXone (ROCEPHIN) 2 g in 0.9% sodium chloride (MBP/ADV) 50 mL  2 g IntraVENous Q24H    butalbital-acetaminophen-caffeine (FIORICET, ESGIC) -40 mg per tablet 1 Tab  1 Tab Oral Q4H PRN    sodium chloride (NS) flush 5-10 mL  5-10 mL IntraVENous Q8H    sodium chloride (NS) flush 5-10 mL  5-10 mL IntraVENous PRN    HYDROcodone-acetaminophen (NORCO) 5-325 mg per tablet 1 Tab  1 Tab Oral Q4H PRN    HYDROmorphone (PF) (DILAUDID) injection 0.5 mg  0.5 mg IntraVENous Q4H PRN    naloxone (NARCAN) injection 0.4 mg  0.4 mg IntraVENous PRN    diphenhydrAMINE (BENADRYL) injection 12.5 mg  12.5 mg IntraVENous Q4H PRN    ondansetron (ZOFRAN) injection 4 mg  4 mg IntraVENous Q6H PRN    docusate sodium (COLACE) capsule 100 mg  100 mg Oral BID    enoxaparin (LOVENOX) injection 40 mg  40 mg SubCUTAneous Q24H         Sukhdeep Liriano NP

## 2018-08-21 ENCOUNTER — APPOINTMENT (OUTPATIENT)
Dept: GENERAL RADIOLOGY | Age: 68
DRG: 872 | End: 2018-08-21
Attending: INTERNAL MEDICINE
Payer: COMMERCIAL

## 2018-08-21 VITALS
HEART RATE: 69 BPM | RESPIRATION RATE: 18 BRPM | OXYGEN SATURATION: 100 % | WEIGHT: 214 LBS | HEIGHT: 68 IN | SYSTOLIC BLOOD PRESSURE: 133 MMHG | TEMPERATURE: 97.4 F | BODY MASS INDEX: 32.43 KG/M2 | DIASTOLIC BLOOD PRESSURE: 64 MMHG

## 2018-08-21 PROBLEM — E66.9 OBESITY: Chronic | Status: ACTIVE | Noted: 2018-08-21

## 2018-08-21 LAB
BACTERIA SPEC CULT: NORMAL
GRAM STN SPEC: NORMAL
SERVICE CMNT-IMP: NORMAL

## 2018-08-21 PROCEDURE — C1751 CATH, INF, PER/CENT/MIDLINE: HCPCS

## 2018-08-21 PROCEDURE — 77030018719 HC DRSG PTCH ANTIMIC J&J -A

## 2018-08-21 PROCEDURE — 76937 US GUIDE VASCULAR ACCESS: CPT

## 2018-08-21 PROCEDURE — 74011000258 HC RX REV CODE- 258: Performed by: INTERNAL MEDICINE

## 2018-08-21 PROCEDURE — 74011250637 HC RX REV CODE- 250/637: Performed by: INTERNAL MEDICINE

## 2018-08-21 PROCEDURE — 74011250636 HC RX REV CODE- 250/636: Performed by: INTERNAL MEDICINE

## 2018-08-21 PROCEDURE — 94760 N-INVAS EAR/PLS OXIMETRY 1: CPT

## 2018-08-21 PROCEDURE — 77030018786 HC NDL GD F/USND BARD -B

## 2018-08-21 RX ORDER — SODIUM CHLORIDE 0.9 % (FLUSH) 0.9 %
10-30 SYRINGE (ML) INJECTION AS NEEDED
Status: DISCONTINUED | OUTPATIENT
Start: 2018-08-21 | End: 2018-08-21 | Stop reason: HOSPADM

## 2018-08-21 RX ORDER — SODIUM CHLORIDE 0.9 % (FLUSH) 0.9 %
10-40 SYRINGE (ML) INJECTION EVERY 8 HOURS
Status: DISCONTINUED | OUTPATIENT
Start: 2018-08-21 | End: 2018-08-21 | Stop reason: HOSPADM

## 2018-08-21 RX ORDER — SODIUM CHLORIDE 0.9 % (FLUSH) 0.9 %
10 SYRINGE (ML) INJECTION EVERY 24 HOURS
Status: DISCONTINUED | OUTPATIENT
Start: 2018-08-21 | End: 2018-08-21 | Stop reason: HOSPADM

## 2018-08-21 RX ORDER — SODIUM CHLORIDE 0.9 % (FLUSH) 0.9 %
10 SYRINGE (ML) INJECTION AS NEEDED
Status: DISCONTINUED | OUTPATIENT
Start: 2018-08-21 | End: 2018-08-21 | Stop reason: HOSPADM

## 2018-08-21 RX ORDER — SODIUM CHLORIDE 0.9 % (FLUSH) 0.9 %
20 SYRINGE (ML) INJECTION EVERY 24 HOURS
Status: DISCONTINUED | OUTPATIENT
Start: 2018-08-21 | End: 2018-08-21 | Stop reason: HOSPADM

## 2018-08-21 RX ADMIN — Medication 10 ML: at 14:27

## 2018-08-21 RX ADMIN — METRONIDAZOLE 500 MG: 250 TABLET ORAL at 08:50

## 2018-08-21 RX ADMIN — Medication 10 ML: at 06:00

## 2018-08-21 RX ADMIN — Medication 10 ML: at 17:00

## 2018-08-21 RX ADMIN — CEFTRIAXONE SODIUM 2 G: 2 INJECTION, POWDER, FOR SOLUTION INTRAMUSCULAR; INTRAVENOUS at 17:14

## 2018-08-21 RX ADMIN — Medication 10 ML: at 17:15

## 2018-08-21 RX ADMIN — METOPROLOL TARTRATE 12.5 MG: 25 TABLET ORAL at 08:50

## 2018-08-21 RX ADMIN — Medication 20 ML: at 17:00

## 2018-08-21 RX ADMIN — METRONIDAZOLE 500 MG: 250 TABLET ORAL at 16:24

## 2018-08-21 RX ADMIN — ACETAMINOPHEN 650 MG: 325 TABLET ORAL at 09:01

## 2018-08-21 NOTE — PROGRESS NOTES
Post Discharge PICC and Antibiotic Orders    1. Diagnosis:  streptococcus constellatus bacteremia  2. Antibiotic:  Ceftriaxone 2gm IV daily through 9/23/18  3. Routine PICC/ Tala/ Portacath Care including PRN catheter flow management  4. Weekly labs:   _x__CBC/diff/platelets   _x__BUN/Creatinine   ___CPK   _x__AST/TotalBilirubin/AlkalinePhosphatase   ___CRP   ___Gentamicin level  ___gentamicin peak/trough   Trough Vancomycin level ____goal 10-15 ___goal 15-20  5. Fax lab to [phone]  Call Critical Lab Values to [phone]  6. May send to IR for line evaluation or replacement [phone]  7. Home Health to pull PIC line at end of therapy or send to IR for Tala removal  8. Allergies: Allergies   Allergen Reactions    Morphine Rash     9. Pharmacy Consult for Vancomycin dosing/gentamicin dosing.       Johnna Ross DO

## 2018-08-21 NOTE — PROGRESS NOTES
New PCP appointment on Friday September 7,2018 @ 10:15 a.m. With Dr. Adrian Pillai. Specialist appointment on Thursday September 13,2018 @ 3:00 p.m. With Dr. Rich Celeste. Specialist appointment on Thursday August 30 @ 9:00 a.m. With Dr. Shania Raymundo.     Added to AVS.    Claudy Orr CM Specialist

## 2018-08-21 NOTE — PROGRESS NOTES
Friend is a 76 y.o. female with no significant medical history presents with headache of 10 days duration - On 8/3/18 pt had gone to [place] and ate shrimps and developed a headache She took motrin round the clock. She went to work on Monday the 6th. ( works as a medical administrator) . On 8/7 she could not go to work because of severe headache and chills and came to ED - They did a urine test and gave her keflex for UTI even though she clearly did not have any urinary symptoms/They discharged her on Fioricet . CT of head was normal . Blood culture was sent as she had fever  The blood culture came positive the next day and they tried to reach her  She came back to the ED on 8/10 with headache but was sent home as she had no fever.  She also was given a shot of decadron  She came back to the ED on 8/13 as advil was not helping her  She had taken 24 tablets of advil in a day  Blood culture was sent again and she was started on zosyn  She was seen by neurology and LP was done and I am asked to see her for the positive blood culture     Pt denies sore throat, runny nose, cough, sob, diarrhea, dysuria, joint pain or rash  She had a mosquito bite 10 days and the area on the rt arm was swollen and red for a few days  She has no travel history  No animal contact  No tick bites  No steroid injections  No hardware in her body  No recent dental work  Not sexually active in 10 yrs  She takes care 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      subjective  Feeling better  No fever  Headache better      Objective:   VITALS:    Patient Vitals for the past 12 hrs:   Temp Pulse Resp BP SpO2   08/21/18 0722 98.1 °F (36.7 °C) 63 18 143/75 97 %   08/21/18 0709 - 63 - - -   08/21/18 0421 98.7 °F (37.1 °C) 66 18 133/83 98 %   08/20/18 2254 - 75 - - -      PHYSICAL EXAM:   General:                    Alert, cooperative, no distress,Many absent teeth- poor dentition  Neck:                                   Supple Back:                                     Lungs:                       No distress  Heart:                        no edema            Abdomen:                    Extremities:               Extremities normal.    Lymph:                       Neurologic:                Grossly non-focal   Psych:                       Normal affect     IMAGING RESULTS:  CT head N    Impression/Recommendation  76 y.o. female with no significant medical history presents with headache of 10 days duration and intermittent fever but masked by NSAID     Streptococcus constellatus bacteremia-     unusual presentation  Unclear source- can have a deep source   oral cavity-poor dentition-  2 separate positive cultures over a period of 5 days   Currently  ceftriaxone   OBED no endocarditis   CT abdomen/pelvis no abscess- diverticulosis  Colonoscopy no malignancy  tagged WBC scan negative  The LP findings are not suggestive of bacterial meningitis- mycotic seeding from endocarditis is also not possible  Could very well be a NSAID effect     Severe headache- there is an element of rebound headache from too much NSAID use/ also has h/o migraine     Trichomonas in the urine from 8/7 - pt not sexually active in 10 yrs- she got metronidazole  HIV test Negative    Plan 6 weeks ceftriaxone through 9/23/18. IV abx orders in chart. PICC ordered.   Patient to f/u with me once IV abx complete

## 2018-08-21 NOTE — DISCHARGE INSTRUCTIONS
HOSPITALIST DISCHARGE INSTRUCTIONS  NAME: Darien Mittal   :  1950   MRN:  [id number]     Date/Time:  2018 11:47 AM    ADMIT DATE: 2018     DISCHARGE DATE: 2018     DISCHARGE DIAGNOSIS:  Bacteremia    MEDICATIONS:  · It is important that you take the medication exactly as they are prescribed. · Keep your medication in the bottles provided by the pharmacist and keep a list of the medication names, dosages, and times to be taken in your wallet. · Do not take other medications without consulting your doctor. Pain Management: per above medications    What to do at Home    Recommended diet:  Cardiac Diet    Recommended activity: Activity as tolerated    If you experience any of the following symptoms then please call your primary care physician or return to the emergency room if you cannot get hold of your doctor:  Fever, chills, nausea, vomiting, diarrhea, change in mentation, falling, bleeding, shortness of breath    Follow Up: Follow-up Information     Follow up With Details Comments Contact Info    Jay Bourgeois NP In 2 weeks  [facility] [address]  [facility] 170      Brandon Sharp MD On 2018 3 pm  6 [facility]  [phone]              Information obtained by :  I understand that if any problems occur once I am at home I am to contact my physician. I understand and acknowledge receipt of the instructions indicated above.                                                                                                                                            Physician's or R.N.'s Signature                                                                  Date/Time                                                                                                                                              Patient or Representative Signature Date/Time       Atrial Fibrillation: Care Instructions  Your Care Instructions    Atrial fibrillation is an irregular and often fast heartbeat. Treating this condition is important for several reasons. It can cause blood clots, which can travel from your heart to your brain and cause a stroke. If you have a fast heartbeat, you may feel lightheaded, dizzy, and weak. An irregular heartbeat can also increase your risk for heart failure. Atrial fibrillation is often the result of another heart condition, such as high blood pressure or coronary artery disease. Making changes to improve your heart condition will help you stay healthy and active. Follow-up care is a key part of your treatment and safety. Be sure to make and go to all appointments, and call your doctor if you are having problems. It's also a good idea to know your test results and keep a list of the medicines you take. How can you care for yourself at home? Medicines    · Take your medicines exactly as prescribed. Call your doctor if you think you are having a problem with your medicine. You will get more details on the specific medicines your doctor prescribes.     · If your doctor has given you a blood thinner to prevent a stroke, be sure you get instructions about how to take your medicine safely. Blood thinners can cause serious bleeding problems.     · Do not take any vitamins, over-the-counter drugs, or herbal products without talking to your doctor first.    Lifestyle changes    · Do not smoke. Smoking can increase your chance of a stroke and heart attack. If you need help quitting, talk to your doctor about stop-smoking programs and medicines. These can increase your chances of quitting for good.     · Eat a heart-healthy diet.     · Stay at a healthy weight. Lose weight if you need to.     · Limit alcohol to 2 drinks a day for men and 1 drink a day for women. Too much alcohol can cause health problems.     · Avoid colds and flu.  Get a pneumococcal vaccine shot. If you have had one before, ask your doctor whether you need another dose. Get a flu shot every year. If you must be around people with colds or flu, wash your hands often. Activity    · If your doctor recommends it, get more exercise. Walking is a good choice. Bit by bit, increase the amount you walk every day. Try for at least 30 minutes on most days of the week. You also may want to swim, bike, or do other activities. Your doctor may suggest that you join a cardiac rehabilitation program so that you can have help increasing your physical activity safely.     · Start light exercise if your doctor says it is okay. Even a small amount will help you get stronger, have more energy, and manage stress. Walking is an easy way to get exercise. Start out by walking a little more than you did in the hospital. Gradually increase the amount you walk.     · When you exercise, watch for signs that your heart is working too hard. You are pushing too hard if you cannot talk while you are exercising. If you become short of breath or dizzy or have chest pain, sit down and rest immediately.     · Check your pulse regularly. Place two fingers on the artery at the palm side of your wrist, in line with your thumb. If your heartbeat seems uneven or fast, talk to your doctor. When should you call for help? Call 911 anytime you think you may need emergency care. For example, call if:    · You have symptoms of a heart attack. These may include:  ¨ Chest pain or pressure, or a strange feeling in the chest.  ¨ Sweating. ¨ Shortness of breath. ¨ Nausea or vomiting. ¨ Pain, pressure, or a strange feeling in the back, neck, jaw, or upper belly or in one or both shoulders or arms. ¨ Lightheadedness or sudden weakness. ¨ A fast or irregular heartbeat. After you call 911, the  may tell you to chew 1 adult-strength or 2 to 4 low-dose aspirin. Wait for an ambulance.  Do not try to drive yourself.     · You have symptoms of a stroke. These may include:  ¨ Sudden numbness, tingling, weakness, or loss of movement in your face, arm, or leg, especially on only one side of your body. ¨ Sudden vision changes. ¨ Sudden trouble speaking. ¨ Sudden confusion or trouble understanding simple statements. ¨ Sudden problems with walking or balance. ¨ A sudden, severe headache that is different from past headaches.     · You passed out (lost consciousness).    Call your doctor now or seek immediate medical care if:    · You have new or increased shortness of breath.     · You feel dizzy or lightheaded, or you feel like you may faint.     · Your heart rate becomes irregular.     · You can feel your heart flutter in your chest or skip heartbeats. Tell your doctor if these symptoms are new or worse.    Watch closely for changes in your health, and be sure to contact your doctor if you have any problems. Where can you learn more? Go to http://hao-tsering.info/. Enter U020 in the search box to learn more about \"Atrial Fibrillation: Care Instructions. \"  Current as of: December 6, 2017  Content Version: 11.7  © 7920-6362 [employer]. Care instructions adapted under license by [employer] (which disclaims liability or warranty for this information). If you have questions about a medical condition or this instruction, always ask your healthcare professional. [address] any warranty or liability for your use of this information.

## 2018-08-21 NOTE — DISCHARGE SUMMARY
Physician Discharge Summary     Patient ID:  Damien Richardson  [id number]  50 y.o.  1950    Admit date: 8/13/2018    Discharge date: 8/21/2018    Admission Diagnoses: Sepsis [facility])  Colon    Discharge Diagnoses:  Principal Diagnosis Bacteremia                                            Principal Problem:    Bacteremia (8/13/2018)    Active Problems:    Leukocytosis (leucocytosis) (8/13/2018)      Intractable headache (8/13/2018)      Obesity (8/21/2018)         Resolved Problems:  Problem List as of 8/21/2018  Date Reviewed: 8/13/2018          Codes Class Noted - Resolved    Obesity (Chronic) ICD-10-CM: E66.9  ICD-9-CM: 278.00  8/21/2018 - Present        * (Principal)Bacteremia ICD-10-CM: R78.81  ICD-9-CM: 790.7  8/13/2018 - Present        Leukocytosis (leucocytosis) ICD-10-CM: K79.891  ICD-9-CM: 288.60  8/13/2018 - Present        Intractable headache ICD-10-CM: R51  ICD-9-CM: 784.0  8/13/2018 - Present                Hospital Course: This is an interim summary and covers the hospitalization from 8/20/2108 to 8/21/2018. For any preceding portion of the patient's hospitalization, please see my partner's notes. Ms. Jackie Echevarria underwent the tagged WBC which was negative. PICC was placed and Dr. Paty Baez arranged home IV antibiotics: ceftriaxone for 2 weeks. PCP: None    Consults: ID    Discharge Exam:  See my Progress Note from today. Disposition: home    Patient Instructions:   Current Discharge Medication List      START taking these medications    Details   cefTRIAXone 2 gram, ADDaptor 1 Device IVPB Per Dr. Racquel Dunne: 0      apixaban (ELIQUIS) 5 mg tablet Take 1 Tab by mouth two (2) times a day. Qty: 60 Tab, Refills: 0         CONTINUE these medications which have NOT CHANGED    Details   cyanocobalamin (VITAMIN B-12) 1,000 mcg tablet Take 1,000 mcg by mouth daily.       butalbital-acetaminophen-caff (FIORICET) -40 mg per capsule Take 1 Cap by mouth every four (4) hours as needed for Pain.  Qty: 20 Cap, Refills: 0      ondansetron (ZOFRAN ODT) 4 mg disintegrating tablet Take 1 Tab by mouth every eight (8) hours as needed for Nausea. Qty: 20 Tab, Refills: 0      fexofenadine-pseudoephedrine (ALLEGRA-D 24 HOUR) 180-240 mg per tablet Take 1 Tab by mouth daily. STOP taking these medications       metoclopramide HCl (REGLAN) 5 mg tablet Comments:   Reason for Stopping:         cephALEXin (KEFLEX) 500 mg capsule Comments:   Reason for Stopping:              Activity: Activity as tolerated  Diet: Regular Diet  Wound Care: None needed    Follow-up Information     Follow up With Details Comments Contact Info    Yanna Luna NP In 2 weeks  [facility] [address] [place] [address] 170      Abelardo Davies MD On 9/13/2018 3 pm  [address] [place] [address]  [phone]      None   None (395) Patient stated that they have no PCP            35 minutes were spent on this discharge.     Signed:  Rose Roberts MD  8/21/2018  1:02 PM

## 2018-08-21 NOTE — PROGRESS NOTES
VAT Note:  Chart reviewed for PICC placement evaluation. Areas reviewed include, but are not limited to, patient's current and past H&P, Lab and Procedure Results, all notes, media records and medications. PICC line ordered and sending for patient to place line.

## 2018-08-21 NOTE — PROGRESS NOTES
Visit documented 8/21/18  Spiritual Care Partner Volunteer visited patient in [facility] Med Surg on 8/20/18.   Documented by: [id number] [facility] ([id number])

## 2018-08-21 NOTE — PROGRESS NOTES
PICC Placement Note    PRE-PROCEDURE VERIFICATION  Correct Procedure: yes  Correct Site:  yes  Temperature: Temp: 97.4 °F (36.3 °C), Temperature Source: Temp Source: Axillary  Recent Labs      08/20/18   0327   BUN  4*   CREA  0.84   PLT  494*   WBC  15.7*     Allergies: Morphine  Education materials for PICC Care given: yes. See Patient Education activity for further details. PICC Booklet placed at bedside: yes    PROCEDURE DETAIL  Consent was obtained and all questions were answered related to risks and benefits. A single lumen PICC line was inserted, as a sterile procedure using ultrasound and modified Seldinger technique for Home IV Therapy. The following documentation is in addition to the PICC properties in the lines/airways flowsheet :  Lot #: [id number]  Lidocaine 1% administered intradermally :yes  Internal Catheter Total Length: 42 (cm)  Vein Selection for PICC:right basilic  Central Line Bundle followed yes  Complication Related to Insertion:no    The placement was verified by EKG, MAX P WAVE @ 42 (cm). PER EKG PICC TIP @ C/A junction.          Line is okay to use: yes    Franky Chi RN

## 2018-08-21 NOTE — PROGRESS NOTES
8/21/2018   4:08 PM  PICC report and AVS sent to [facility] in [address]. PCP appt scheduled by CM specialist, CM gave pt [facility] information. Pt ready for d/c from CM standpoint. Tanja Cleary       1:43 PM  Pt stable for d/c, awaiting PICC placement, HCP will provide [facility] nursing. Pt notified me she is discharging to her mother's home @ [address] [facility]. CM specialist is working on PCP appt for [facility]. Tanja Cleary      10:49 AM  Pt to d/c on IV ABx awaiting PICC placement today, Cm sent IV ABx order to HCP for pricing. Will Follow.   Tanja Cleary

## 2018-08-21 NOTE — PROGRESS NOTES
[facility]  [address]  [phone]      Medical Progress Note      NAME: Sabrina Todd   :  1950  MRM:  [id number]    Date/Time: 2018  12:27 PM         Subjective:     Chief Complaint:  Headache: resolved    ROS:  (bold if positive, if negative)                        Tolerating Diet          Objective:       Vitals:          Last 24hrs VS reviewed since prior progress note.  Most recent are:    Visit Vitals    /64 (BP 1 Location: Left arm, BP Patient Position: At rest)    Pulse 60    Temp 97.4 °F (36.3 °C)    Resp 18    Ht 5' 8\" (1.727 m)    Wt 97.1 kg (214 lb)    SpO2 100%    BMI 32.54 kg/m2     SpO2 Readings from Last 6 Encounters:   18 100%   08/10/18 97%   18 94%            Intake/Output Summary (Last 24 hours) at 18 1228  Last data filed at 18 2128   Gross per 24 hour   Intake               50 ml   Output                0 ml   Net               50 ml          Exam:     Physical Exam:    Gen:  Well-developed, obese, in no acute distress  HEENT:  Pink conjunctivae, PERRL, hearing intact to voice, moist mucous membranes  Neck:  Supple, without masses, thyroid non-tender  Resp:  No accessory muscle use, clear breath sounds without wheezes rales or rhonchi  Card:  No murmurs, normal S1, S2 without thrills, bruits or peripheral edema  Abd:  Soft, non-tender, non-distended, normoactive bowel sounds are present, no palpable organomegaly and no detectable hernias  Lymph:  No cervical or inguinal adenopathy  Musc:  No cyanosis or clubbing  Skin:  No rashes or ulcers, skin turgor is good  Neuro:  Cranial nerves are grossly intact, no focal motor weakness, follows commands appropriately  Psych:  Good insight, oriented to person, place and time, alert    Medications Reviewed: (see below)    Lab Data Reviewed: (see below)    ______________________________________________________________________    Medications:     Current Facility-Administered Medications   Medication Dose Route Frequency    metroNIDAZOLE (FLAGYL) tablet 500 mg  500 mg Oral TID    metoprolol tartrate (LOPRESSOR) tablet 12.5 mg  12.5 mg Oral Q12H    acetaminophen (TYLENOL) tablet 650 mg  650 mg Oral Q6H PRN    cefTRIAXone (ROCEPHIN) 2 g in 0.9% sodium chloride (MBP/ADV) 50 mL  2 g IntraVENous Q24H    butalbital-acetaminophen-caffeine (FIORICET, ESGIC) -40 mg per tablet 1 Tab  1 Tab Oral Q4H PRN    sodium chloride (NS) flush 5-10 mL  5-10 mL IntraVENous Q8H    sodium chloride (NS) flush 5-10 mL  5-10 mL IntraVENous PRN    HYDROcodone-acetaminophen (NORCO) 5-325 mg per tablet 1 Tab  1 Tab Oral Q4H PRN    HYDROmorphone (PF) (DILAUDID) injection 0.5 mg  0.5 mg IntraVENous Q4H PRN    naloxone (NARCAN) injection 0.4 mg  0.4 mg IntraVENous PRN    diphenhydrAMINE (BENADRYL) injection 12.5 mg  12.5 mg IntraVENous Q4H PRN    ondansetron (ZOFRAN) injection 4 mg  4 mg IntraVENous Q6H PRN    docusate sodium (COLACE) capsule 100 mg  100 mg Oral BID    enoxaparin (LOVENOX) injection 40 mg  40 mg SubCUTAneous Q24H            Lab Review:     Recent Labs      08/20/18   0327  08/19/18   0645   WBC  15.7*  13.8*   HGB  13.0  11.8   HCT  38.3  34.7*   PLT  494*  398     Recent Labs      08/20/18   0327  08/19/18 2006   NA  142  140   K  3.9  3.1*   CL  107  105   CO2  25  24   GLU  101*  120*   BUN  4*  4*   CREA  0.84  0.72   CA  9.8  9.5   MG  2.4  2.2     No results found for: GLUCPOC  No results for input(s): PH, PCO2, PO2, HCO3, FIO2 in the last 72 hours. No results for input(s): INR in the last 72 hours.     No lab exists for component: INREXT, INREXT  No results found for: SDES  Lab Results   Component Value Date/Time    Culture result: NO GROWTH 5 DAYS 08/16/2018 11:07 AM    Culture result: Culture performed on Unspun Fluid 08/14/2018 01:12 PM Culture result: NO GROWTH ON SOLID MEDIA AT 4 DAYS 08/14/2018 01:12 PM    Culture result: NO GROWTH IN THIO BROTH AT 7 DAYS 08/14/2018 01:12 PM            Assessment:     Principal Problem:    Bacteremia (8/13/2018)    Active Problems:    Leukocytosis (leucocytosis) (8/13/2018)      Intractable headache (8/13/2018)      UTI (urinary tract infection) (8/13/2018)      Obesity (8/21/2018)           Plan:     Principal Problem:    Bacteremia (8/13/2018)   - S.pneumoniae, no clear source, tagged WBC scan negative   - home on IV antibiotics per Dr. Debra Scott   - PICC ordered    Active Problems:    Leukocytosis (leucocytosis) (8/13/2018)   - persists, NOT septic      Intractable headache (8/13/2018)   - resolved      UTI (urinary tract infection) (8/13/2018)   - ruled out with two negative cultures      Obesity (8/21/2018)   - counseled on weight loss       Atrial fibrillation   - home on oral apixaban per Cardiology      Total time spent in patient care: 25 minutes                  Care Plan discussed with: Patient, Care Manager, Nursing Staff and Dr. Debra Scott    Discussed:  Code Status, Care Plan and D/C Planning    Prophylaxis:  Lovenox    Disposition:   PT, OT, RN           ___________________________________________________    Attending Physician: Cristian Amador MD

## 2018-08-21 NOTE — PROGRESS NOTES
Bedside and Verbal shift change report given to Tiffani (oncoming nurse) by Jesus Mensah (offgoing nurse). Report included the following information SBAR, Kardex, MAR, Accordion and Recent Results.

## 2018-08-22 LAB
B BURGDOR IGG PATRN CSF IB-IMP: NEGATIVE
B BURGDOR IGM PATRN CSF IB-IMP: NEGATIVE
B BURGDOR18KD IGG CSF QL IB: ABNORMAL
B BURGDOR23KD IGG CSF QL IB: ABNORMAL
B BURGDOR23KD IGM CSF QL IB: ABNORMAL
B BURGDOR28KD IGG CSF QL IB: ABNORMAL
B BURGDOR30KD IGG CSF QL IB: ABNORMAL
B BURGDOR39KD IGG CSF QL IB: ABNORMAL
B BURGDOR39KD IGM CSF QL IB: ABNORMAL
B BURGDOR41KD IGG CSF QL IB: ABNORMAL
B BURGDOR41KD IGM CSF QL IB: PRESENT
B BURGDOR45KD IGG CSF QL IB: ABNORMAL
B BURGDOR58KD IGG CSF QL IB: ABNORMAL
B BURGDOR66KD IGG CSF QL IB: ABNORMAL
B BURGDOR93KD IGG CSF QL IB: ABNORMAL
BACTERIA SPEC CULT: NORMAL
SERVICE CMNT-IMP: NORMAL

## 2018-08-23 LAB
AEROBIC ID BY MALDI, ORJ11T: NORMAL
SOURCE, RSRC62: NORMAL

## 2018-08-30 ENCOUNTER — OFFICE VISIT (OUTPATIENT)
Dept: NEUROLOGY | Age: 68
End: 2018-08-30

## 2018-08-30 VITALS
SYSTOLIC BLOOD PRESSURE: 106 MMHG | RESPIRATION RATE: 16 BRPM | WEIGHT: 206 LBS | HEIGHT: 68 IN | OXYGEN SATURATION: 96 % | HEART RATE: 60 BPM | BODY MASS INDEX: 31.22 KG/M2 | DIASTOLIC BLOOD PRESSURE: 78 MMHG

## 2018-08-30 DIAGNOSIS — G44.41 INTRACTABLE DRUG-INDUCED HEADACHE, NOT ELSEWHERE CLASSIFIED: Primary | ICD-10-CM

## 2018-08-30 NOTE — PROGRESS NOTES
Chief Complaint Patient presents with  
 Head Pain 1. Have you been to the ER, urgent care clinic since your last visit? Hospitalized since your last visit? Yes Where: ER patient had a blood infection 2. Have you seen or consulted any other health care providers outside of the [facility] since your last visit? Include any pap smears or colon screening. No  
 
Visit Vitals  /78  Pulse 60  Resp 16  
 Ht 5' 8\" (1.727 m)  Wt 93.4 kg (206 lb)  SpO2 96%  BMI 31.32 kg/m2

## 2018-08-30 NOTE — MR AVS SNAPSHOT
[facility] [address] [place] [address] [phone] Patient: Mervat Huerta MRN: [id number] DDK:[birth year] Visit Information Date & Time Provider Department Dept. Phone Encounter #  
 8/30/2018  9:00 AM Kerri Shaikh MD [facility] Neu[facility] [phone] [id number] Follow-up Instructions Return if symptoms worsen or fail to improve. Your Appointments 9/13/2018  3:00 PM  
ESTABLISHED PATIENT with Joe Burgos MD  
[facility] ([address] [place] [address]) Appt Note: dx afib [address] [place] [address]  
[phone]  
  
   
 [address] [facility] [address] [place] [address]  
  
    
 9/26/2018  2:00 PM  
ROUTINE CARE with Go Goyal DO [facility] ([address] [place] [address]) Appt Note: hosp f/u, tld 1:30; r/s from 10/03/18, tld 1:30  
 [address] [facility] [place] [address]  
[address] [address]  
  
    
  
 9/7/2018 10:15 AM  
TRANSITIONAL CARE MANAGEMENT with Fred Ferrari MD  
[facility] [address] [place] [address] Appt Note: establish a pcp [address] [facility] [place] [address]  
[phone]  
  
   
 [address] [facility] [address] Upcoming Health Maintenance Date Due Hepatitis C Screening 1950 DTaP/Tdap/Td series (1 - Tdap) 7/20/1971 BREAST CANCER SCRN MAMMOGRAM 7/20/2000 FOBT Q 1 YEAR AGE 50-75 7/20/2000 ZOSTER VACCINE AGE 60> 5/20/2010 GLAUCOMA SCREENING Q2Y 7/20/2015 Bone Densitometry (Dexa) Screening 7/20/2015 Pneumococcal 65+ High/Highest Risk (1 of 2 - PCV13) 7/20/2015 Influenza Age 5 to Adult 8/1/2018 MEDICARE YEARLY EXAM 8/8/2018 Allergies as of 8/30/2018  Review Complete On: 8/17/2018 By: Berta Lopez RN  
  
 Severity Noted Reaction Type Reactions Morphine  08/07/2018    Rash Current Immunizations  Never Reviewed No immunizations on file. Not reviewed this visit You Were Diagnosed With   
  
 Codes Comments Intractable drug-induced headache, not elsewhere classified    -  Primary ICD-10-CM: G44.41 
ICD-9-CM: 339.3 Vitals BP Pulse Resp Height(growth percentile) Weight(growth percentile) SpO2  
 106/78 60 16 5' 8\" (1.727 m) 206 lb (93.4 kg) 96% BMI OB Status Smoking Status 31.32 kg/m2 Hysterectomy Never Smoker Vitals History BMI and BSA Data Body Mass Index Body Surface Area  
 31.32 kg/m 2 2.12 m 2 Your Updated Medication List  
  
   
This list is accurate as of 8/30/18  9:34 AM.  Always use your most recent med list. ALLEGRA-D 24 HOUR 180-240 mg per tablet Generic drug:  fexofenadine-pseudoephedrine Take 1 Tab by mouth daily. apixaban 5 mg tablet Commonly known as:  Princella Anthony Take 1 Tab by mouth two (2) times a day. butalbital-acetaminophen-caff -40 mg per capsule Commonly known as:  [employer] Take 1 Cap by mouth every four (4) hours as needed for Pain. cefTRIAXone 2 gram, ADDaptor 1 Device IVPB Per Dr. Adilene Qureshi  
  
 ondansetron 4 mg disintegrating tablet Commonly known as:  ZOFRAN ODT Take 1 Tab by mouth every eight (8) hours as needed for Nausea. VITAMIN B-12 1,000 mcg tablet Generic drug:  cyanocobalamin Take 1,000 mcg by mouth daily. Follow-up Instructions Return if symptoms worsen or fail to improve. Introducing [facility]! [employer] introduces [employer] patient portal. Now you can access parts of your medical record, email your doctor's office, and request medication refills online. 1. In your internet browser, go to [email or web address] 2. Click on the First Time User? Click Here link in the Sign In box.  You will see the New Member Sign Up page. 3. Enter your [employer] Access Code exactly as it appears below. You will not need to use this code after youve completed the sign-up process. If you do not sign up before the expiration date, you must request a new code. · [employer] Access Code: [email or web address] Expires: 11/5/2018 10:20 PM 
 
4. Enter the last four digits of your Social Security Number (xxxx) and Date of Birth (mm/dd/yyyy) as indicated and click Submit. You will be taken to the next sign-up page. 5. Create a [employer] ID. This will be your [employer] login ID and cannot be changed, so think of one that is secure and easy to remember. 6. Create a [employer] password. You can change your password at any time. 7. Enter your Password Reset Question and Answer. This can be used at a later time if you forget your password. 8. Enter your e-mail address. You will receive e-mail notification when new information is available in [address] 9. Click Sign Up. You can now view and download portions of your medical record. 10. Click the Download Summary menu link to download a portable copy of your medical information. If you have questions, please visit the Frequently Asked Questions section of the [employer] website. Remember, [employer] is NOT to be used for urgent needs. For medical emergencies, dial 911. Now available from your iPhone and Android! Please provide this summary of care documentation to your next provider. Your primary care clinician is listed as Pete Perez. If you have any questions after today's visit, please call [phone].

## 2018-08-30 NOTE — PROGRESS NOTES
Neurology Progress Note Patient ID: Nat SPENCE Friend [id number] 
76 y.o. 
1950 History provided by patient Chief Complaint: Headache Subjective: This is a 51-year-old female here for hospital follow-up of severe intractable headache. Patient was admitted to [facility] this month due to headache and fever. Neurology was consulted secondary to the headache. Patient was treated with Toradol which significantly improved the headache. However, she then developed a rebound headache and had to be weaned off of analgesics with prednisone. She was diagnosed with bacteremia. The source was not identified during her hospital course. We did do a lumbar puncture which showed a slightly elevated white count but all other factors are normal.  It was thought the white count was secondary to the NSAID use. Patient is currently on 6 weeks of IV ceftriaxone which she is administering via a PICC line at home. While in the hospital she was also diagnosed with atrial fibrillation and started on Eliquis. She is not having any side effects from this. Patient's headache was a 10/10 pain in all over the head. At this point it has resolved. She has not had to take any other medications once she finished the steroids. She does have a remote history of migraines in 1969 but has not had any since that time. This headache was more of a pressure/tension type headache and unlike her typical migraine. She did have an MRI of the brain in the hospital that was negative. They did repeat a CT with and without contrast to evaluate for any infectious possibilities in the brain but this was also negative. Objective: All records in [facility] reviewed and noted ROS: 
Per HPI All other 12 pt ROS negative Meds: 
Current Outpatient Prescriptions Medication Sig  cefTRIAXone 2 gram, ADDaptor 1 Device IVPB Per Dr. Gonzalo Kulkarni  apixaban (ELIQUIS) 5 mg tablet Take 1 Tab by mouth two (2) times a day.  cyanocobalamin (VITAMIN B-12) 1,000 mcg tablet Take 1,000 mcg by mouth daily. No current facility-administered medications for this visit. Imaging: MRI brain: neg LP below with relatively benign picture except some WBC which is likely due to NSAID use. OBED with small PFO Lab Review Reviewed lab results and connect care. EKG with atrial fibrillation Exam: 
Visit Vitals  /78  Pulse 60  Resp 16  
 Ht 5' 8\" (1.727 m)  Wt 93.4 kg (206 lb)  SpO2 96%  BMI 31.32 kg/m2 Gen: Well developed CV: RRR Lungs: non labored breathing Abd: non distending Neuro: A&O x 3, no dysarthria or aphasia CN II-XII: PERRL, EOMI, face symmetric, tongue/palate midline Motor: strength 5/5 all four ext Sensory: intact to LT Gait: normal 
 
Assessment:  
 
Patient Active Problem List  
Diagnosis Code  Bacteremia R78.81  Leukocytosis (leucocytosis) D72.829  
 Intractable headache R51  Obesity E779  
 
19-year-old female who presented  for follow-up of intractable headache and fever. Since patient has been treated for bacteremia her headache has improved. MRI of the brain was negative. LP was negative. She is off of medications at this time and I do not think she needs additional medicines that she is improved. Neuro exam is nonfocal today. Plan: 1. MRI of the brain negative. 2.    Patient not requiring any medication at this time for headache 3. Continues to follow with infectious disease. She has several more weeks of ceftriaxone IV to go 4. LP as above and was essentially negative for infection 5. Patient is completed 5 days of prednisone so will not redo at this time 6. [facility] and stroke prevention Follow-up as needed Signed: 
Gaby Barker MD 
8/30/2018 
9:50 AM 
 
Medications and side effects discussed with patient in detail.   With any new medications prescribed, patient was given instructions on administration and side effects. Written medication information was provided to the patient as well. This note was created using voice recognition software. Despite editing, there may be syntax errors. This note will not be viewable in [address]

## 2018-08-30 NOTE — LETTER
Neurology Progress Note Patient ID: Nat SPENCE Friend [id number] 
76 y.o. 
1950 History provided by patient Chief Complaint: Headache Subjective: This is a 59-year-old female here for hospital follow-up of severe intractable headache. Patient was admitted to [address] [place] [address] this month due to headache and fever. Neurology was consulted secondary to the headache. Patient was treated with Toradol which significantly improved the headache. However, she then developed a rebound headache and had to be weaned off of analgesics with prednisone. She was diagnosed with bacteremia. The source was not identified during her hospital course. We did do a lumbar puncture which showed a slightly elevated white count but all other factors are normal.  It was thought the white count was secondary to the NSAID use. Patient is currently on 6 weeks of IV ceftriaxone which she is administering via a PICC line at home. While in the hospital she was also diagnosed with atrial fibrillation and started on Eliquis. She is not having any side effects from this. Patient's headache was a 10/10 pain in all over the head. At this point it has resolved. She has not had to take any other medications once she finished the steroids. She does have a remote history of migraines in 1969 but has not had any since that time. This headache was more of a pressure/tension type headache and unlike her typical migraine. She did have an MRI of the brain in the hospital that was negative. They did repeat a CT with and without contrast to evaluate for any infectious possibilities in the brain but this was also negative. Objective: All records in [facility] reviewed and noted ROS: 
Per HPI All other 12 pt ROS negative Meds: 
Current Outpatient Prescriptions Medication Sig  cefTRIAXone 2 gram, ADDaptor 1 Device IVPB Per Dr. Kathleen Soriano  apixaban (ELIQUIS) 5 mg tablet Take 1 Tab by mouth two (2) times a day.  cyanocobalamin (VITAMIN B-12) 1,000 mcg tablet Take 1,000 mcg by mouth daily. No current facility-administered medications for this visit. Imaging: MRI brain: neg LP below with relatively benign picture except some WBC which is likely due to NSAID use. OBED with small PFO Lab Review Reviewed lab results and connect care. EKG with atrial fibrillation Exam: 
Visit Vitals  /78  Pulse 60  Resp 16  
 Ht 5' 8\" (1.727 m)  Wt 93.4 kg (206 lb)  SpO2 96%  BMI 31.32 kg/m2 Gen: Well developed CV: RRR Lungs: non labored breathing Abd: non distending Neuro: A&O x 3, no dysarthria or aphasia CN II-XII: PERRL, EOMI, face symmetric, tongue/palate midline Motor: strength 5/5 all four ext Sensory: intact to LT Gait: normal 
 
Assessment:  
 
Patient Active Problem List  
Diagnosis Code  Bacteremia R78.81  Leukocytosis (leucocytosis) D72.829  
 Intractable headache R51  Obesity E779  
 
60-year-old female who presented  for follow-up of intractable headache and fever. Since patient has been treated for bacteremia her headache has improved. MRI of the brain was negative. LP was negative. She is off of medications at this time and I do not think she needs additional medicines that she is improved. Neuro exam is nonfocal today. Plan: 1. MRI of the brain negative. 2.     Patient not requiring any medication at this time for headache 3. Continues to follow with infectious disease. She has several more weeks of ceftriaxone IV to go 4. LP as above and was essentially negative for infection 5. Patient is completed 5 days of prednisone so will not redo at this time 6. Continue Eliquis for A. fib and stroke prevention Follow-up as needed Signed: 
Jany Glaser MD 
8/30/2018 
9:50 AM 
 
Medications and side effects discussed with patient in detail.   With any new medications prescribed, patient was given instructions on administration and side effects. Written medication information was provided to the patient as well. This note was created using voice recognition software. Despite editing, there may be syntax errors. This note will not be viewable in [address] Chief Complaint Patient presents with  
 Head Pain 1. Have you been to the ER, urgent care clinic since your last visit? Hospitalized since your last visit? Yes Where: ER patient had a blood infection 2. Have you seen or consulted any other health care providers outside of the [address] [place] [address] since your last visit? Include any pap smears or colon screening. No  
 
Visit Vitals  /78  Pulse 60  Resp 16  
 Ht 5' 8\" (1.727 m)  Wt 93.4 kg (206 lb)  SpO2 96%  BMI 31.32 kg/m2

## 2018-09-07 ENCOUNTER — OFFICE VISIT (OUTPATIENT)
Dept: INTERNAL MEDICINE CLINIC | Age: 68
End: 2018-09-07

## 2018-09-07 VITALS
WEIGHT: 209 LBS | RESPIRATION RATE: 16 BRPM | OXYGEN SATURATION: 99 % | SYSTOLIC BLOOD PRESSURE: 134 MMHG | DIASTOLIC BLOOD PRESSURE: 79 MMHG | HEART RATE: 75 BPM | TEMPERATURE: 99 F | BODY MASS INDEX: 31.67 KG/M2 | HEIGHT: 68 IN

## 2018-09-07 DIAGNOSIS — G44.41 INTRACTABLE DRUG-INDUCED HEADACHE, NOT ELSEWHERE CLASSIFIED: ICD-10-CM

## 2018-09-07 DIAGNOSIS — R78.81 BACTEREMIA: ICD-10-CM

## 2018-09-07 DIAGNOSIS — D72.829 LEUKOCYTOSIS, UNSPECIFIED TYPE: ICD-10-CM

## 2018-09-07 DIAGNOSIS — I48.0 PAROXYSMAL ATRIAL FIBRILLATION (HCC): ICD-10-CM

## 2018-09-07 DIAGNOSIS — Z12.31 BREAST CANCER SCREENING BY MAMMOGRAM: ICD-10-CM

## 2018-09-07 DIAGNOSIS — Z76.89 ENCOUNTER TO ESTABLISH CARE WITH NEW DOCTOR: Primary | ICD-10-CM

## 2018-09-07 NOTE — PROGRESS NOTES
Quiana De La Torre is a 76 y.o. female who presents today for Establish Care (patient is not fasting) Annalee Calhoun She has a history of  
Patient Active Problem List  
Diagnosis Code  Bacteremia R78.81  Leukocytosis (leucocytosis) D72.829  
 Intractable headache R51  Obesity E66.9 Annalee Calhoun Today patient is here to establish care. Previous PCP none. she is switching because establish care. Records are not available for me to review. she does have other concerns. Hospitalization: From 8/13-8/21 due to Strep Bacteremia. Unclear of source. Had been seen for H/A several times before in ER. Pt ended up having negative C-scope, OBED, Tagged RBC scan and LP. Seen by ID and placed on IVABx to complete 6 weeks as source never found. Symptoms have all improved. Feeling well. Afib: noted during hospitalization. Cardiology suggest taking NOAC. H/A: since resolved. Seen by neuro and will f/u PRN. No more H/A. 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 Takes care of mother who is 80. 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 No tobacco, No EtOH. , 2 children, 5 grandchildren Family: reviewed. ROS Review of Systems Constitutional: Negative for chills, fever and weight loss. HENT: Negative for congestion and sore throat. Eyes: Negative for blurred vision, double vision and photophobia. Respiratory: Negative for cough and shortness of breath. Cardiovascular: Negative for chest pain, palpitations and leg swelling. Gastrointestinal: Negative for abdominal pain, constipation, diarrhea (looser BM), heartburn, nausea and vomiting. Genitourinary: Negative for dysuria, frequency and urgency. Musculoskeletal: Negative for joint pain and myalgias. Skin: Negative for rash. Neurological: Negative. Negative for headaches. Endo/Heme/Allergies: Does not bruise/bleed easily. Psychiatric/Behavioral: Negative for depression, memory loss and suicidal ideas. The patient is not nervous/anxious. Visit Vitals  /79 (BP 1 Location: Left arm, BP Patient Position: Sitting)  Pulse 75  Temp 99 °F (37.2 °C) (Oral)  Resp 16  
 Ht 5' 8\" (1.727 m)  Wt 209 lb (94.8 kg)  SpO2 99%  BMI 31.78 kg/m2 Physical Exam  
Constitutional: She is oriented to person, place, and time and well-developed, well-nourished, and in no distress. No distress. HENT:  
Head: Normocephalic and atraumatic. Mouth/Throat: No oropharyngeal exudate. Poor dentition. Eyes: Conjunctivae are normal. Pupils are equal, round, and reactive to light. No scleral icterus. Neck: Normal range of motion. No JVD present. No thyromegaly present. Cardiovascular: Normal rate and regular rhythm. Exam reveals no gallop and no friction rub. No murmur heard. Pulmonary/Chest: Effort normal and breath sounds normal. No respiratory distress. She has no wheezes. Abdominal: Soft. Bowel sounds are normal. She exhibits no distension. There is no rebound and no guarding. Musculoskeletal: Normal range of motion. She exhibits no edema. [place] [place] to [facility] area clean. Neurological: She is alert and oriented to person, place, and time. No cranial nerve deficit. Skin: Skin is dry. No rash noted. Psychiatric: Mood, memory, affect and judgment normal.  
 
 
Current Outpatient Prescriptions Medication Sig  cefTRIAXone 2 gram, ADDaptor 1 Device IVPB Per Dr. Adilene Qureshi  apixaban (ELIQUIS) 5 mg tablet Take 1 Tab by mouth two (2) times a day.  cyanocobalamin (VITAMIN B-12) 1,000 mcg tablet Take 1,000 mcg by mouth daily. No current facility-administered medications for this visit. Past Medical History:  
Diagnosis Date  History of vascular access device 08/21/2018  
 4 Tamazight PICC line  Obesity 8/21/2018 Past Surgical History:  
Procedure Laterality Date  COLONOSCOPY N/A 8/17/2018 COLONOSCOPY performed by Fe Barrera MD at [address] Hysterectomy Social History Substance Use Topics  Smoking status: Never Smoker  Smokeless tobacco: Never Used  Alcohol use No  
  
Family History Problem Relation Age of Onset  Diabetes Mother  Heart Attack Brother Allergies Allergen Reactions  Morphine Rash Assessment/Plan Diagnoses and all orders for this visit: 1. Encounter to establish care with new doctor - have reviewed recent hospitalization record. Overall doing well. No history of regular healthcare evaluations. 2. Bacteremia - unclear trigger (? Dental). ID following. On IV ABX for completion of 6 weeks. Seeing ID later this month. -     LIPID PANEL 3. Paroxysmal atrial fibrillation (HCC) - one episode in setting of illness. No history of this. CHADS2 score of 0 and CHADSVASC score of 2. Seeing cards. Check lipids before f/u. -     LIPID PANEL 
 
4. Intractable drug-induced headache, not elsewhere classified - Resolved with resolution of infection. 5. Leukocytosis, unspecified type - resolved. 6. Breast cancer screening by mammogram -  Before f/u. -     [facility] MAMMO BI SCREENING INCL CAD; Future Follow-up Disposition: 
Return in about 2 months (around 11/7/2018) for Physical. . 
 
Gabriel Joseph MD 
9/7/2018

## 2018-09-07 NOTE — MR AVS SNAPSHOT
[address] [facility] [address] 
 
 
 [address] [facility] [place] [address] [facility] 
[phone] Patient: Cathleen Isaacs MRN: [id number] Visit Information Date & Time Provider Department Dept. Phone Encounter #  
 9/7/2018 10:15 AM Gabriel Joseph MD [facility] [address] [facility] [id number] Your Appointments 9/13/2018  3:00 PM  
ESTABLISHED PATIENT with Sergei Parker MD  
[facility] ([facility]) Appt Note: dx afib [address] [place] [address] [facility] [id number]  
[phone]  
  
   
 [address] [place] [address] [place] [id number]  
  
    
 9/26/2018  2:00 PM  
ROUTINE CARE with DO Gaviota Engle [facility] ([facility]) Appt Note: hosp f/u, tld 1:30; r/s from 10/03/18, tld 1:30  
 [address] [facility] [address] [facility]  
[phone]  
  
   
 [address] [place] [address] [address] [address] Upcoming Health Maintenance Date Due Hepatitis C Screening 1950 DTaP/Tdap/Td series (1 - Tdap) 7/20/1971 BREAST CANCER SCRN MAMMOGRAM 7/20/2000 FOBT Q 1 YEAR AGE 50-75 7/20/2000 ZOSTER VACCINE AGE 60> 5/20/2010 GLAUCOMA SCREENING Q2Y 7/20/2015 Bone Densitometry (Dexa) Screening 7/20/2015 Pneumococcal 65+ High/Highest Risk (1 of 2 - PCV13) 7/20/2015 Influenza Age 5 to Adult 8/1/2018 MEDICARE YEARLY EXAM 8/8/2018 Allergies as of 9/7/2018  Review Complete On: 9/7/2018 By: Bouchra Cowart LPN Severity Noted Reaction Type Reactions Morphine  08/07/2018    Rash Current Immunizations  Never Reviewed No immunizations on file. Not reviewed this visit You Were Diagnosed With   
  
 Codes Comments Encounter to establish care with new doctor    -  Primary ICD-10-CM: Z76.89 
ICD-9-CM: V65.8 Bacteremia     ICD-10-CM: R78.81 ICD-9-CM: 790.7 Paroxysmal atrial fibrillation (HCC)     ICD-10-CM: I48.0 ICD-9-CM: 427.31 Intractable drug-induced headache, not elsewhere classified     ICD-10-CM: G44.41 
ICD-9-CM: 339.3 Leukocytosis, unspecified type     ICD-10-CM: D72.829 ICD-9-CM: 288.60 Breast cancer screening by mammogram     ICD-10-CM: Z12.31 
ICD-9-CM: V76.12 Vitals BP Pulse Temp Resp Height(growth percentile) Weight(growth percentile) 134/79 (BP 1 Location: Left arm, BP Patient Position: Sitting) 75 99 °F (37.2 °C) (Oral) 16 5' 8\" (1.727 m) 209 lb (94.8 kg) SpO2 BMI OB Status Smoking Status 99% 31.78 kg/m2 Hysterectomy Never Smoker Vitals History BMI and BSA Data Body Mass Index Body Surface Area 31.78 kg/m 2 2.13 m 2 Preferred Pharmacy Pharmacy Name Phone [facility] [place], [address] [place] AT [facility] [phone] Your Updated Medication List  
  
   
This list is accurate as of 9/7/18 11:16 AM.  Always use your most recent med list.  
  
  
  
  
 apixaban 5 mg tablet Commonly known as:  Sabrina Can Take 1 Tab by mouth two (2) times a day. cefTRIAXone 2 gram, ADDaptor 1 Device IVPB Per Dr. Trixie Riedel VITAMIN B-12 1,000 mcg tablet Generic drug:  cyanocobalamin Take 1,000 mcg by mouth daily. We Performed the Following LIPID PANEL [28017 CPT(R)] To-Do List   
 09/07/2018 Imaging:  ERIC MAMMO BI SCREENING INCL CAD Introducing [facility]! [facility] introduces H2Mob patient portal. Now you can access parts of your medical record, email your doctor's office, and request medication refills online. 1. In your internet browser, go to [email or web address] 2. Click on the First Time User? Click Here link in the Sign In box. You will see the New Member Sign Up page. 3. Enter your [employer] Access Code exactly as it appears below.  You will not need to use this code after youve completed the sign-up process. If you do not sign up before the expiration date, you must request a new code. · [email or web address] Access Code: [email or web address] Expires: 11/5/2018 10:20 PM 
 
4. Enter the last four digits of your Social Security Number (xxxx) and Date of Birth (mm/dd/yyyy) as indicated and click Submit. You will be taken to the next sign-up page. 5. Create a [email or web address] ID. This will be your [email or web address] login ID and cannot be changed, so think of one that is secure and easy to remember. 6. Create a [email or web address] password. You can change your password at any time. 7. Enter your Password Reset Question and Answer. This can be used at a later time if you forget your password. 8. Enter your e-mail address. You will receive e-mail notification when new information is available in [address] 9. Click Sign Up. You can now view and download portions of your medical record. 10. Click the Download Summary menu link to download a portable copy of your medical information. If you have questions, please visit the Frequently Asked Questions section of the [email or web address] website. Remember, [email or web address] is NOT to be used for urgent needs. For medical emergencies, dial 911. Now available from your iPhone and Android! Please provide this summary of care documentation to your next provider. Your primary care clinician is listed as Fabrizio Gip. If you have any questions after today's visit, please call [phone].

## 2018-09-13 ENCOUNTER — OFFICE VISIT (OUTPATIENT)
Dept: CARDIOLOGY CLINIC | Age: 68
End: 2018-09-13

## 2018-09-13 VITALS
HEIGHT: 68 IN | WEIGHT: 205 LBS | DIASTOLIC BLOOD PRESSURE: 80 MMHG | OXYGEN SATURATION: 97 % | HEART RATE: 81 BPM | BODY MASS INDEX: 31.07 KG/M2 | SYSTOLIC BLOOD PRESSURE: 130 MMHG | RESPIRATION RATE: 18 BRPM

## 2018-09-13 DIAGNOSIS — R78.81 BACTEREMIA: ICD-10-CM

## 2018-09-13 DIAGNOSIS — I48.0 PAF (PAROXYSMAL ATRIAL FIBRILLATION) (HCC): Primary | ICD-10-CM

## 2018-09-13 NOTE — PROGRESS NOTES
Joe Burgos MD    [id number] [place], [address] [facility]    Office [phone],NIG [phone]  Pager [phone]    Mervat Huerta is a 76 y.o. female is here for f/u visit. Primary care physician:  Fred Ferrari MD    Patient Active Problem List   Diagnosis Code    Bacteremia R78.81    Leukocytosis (leucocytosis) D72.829    Intractable headache R51    Obesity E66.9       Dear Dr. Tereza Orlando,    I had the pleasure of seeing Ms. Johns A Friend in the office today. Chief complaint:  No chief complaint on file. Assessment:    Paroxysmal atrial fibrillation  Chronic anticoagulation  Hospital admission 8/13/18 to 8/21/18 for bacteremia/leukocytosis. Also was diagnosed to have new onset atrial fibrillation during his admission. Plan:     In SR. Tolerating Eliquis. Patient will follow up with me in 4-6 months. Will consider stress nuclear study and also do E cardio event monitor to see if she has any paroxysmal atrial fibrillation. Will consider switching her Eliquis to aspirin at that point if no other issues are going on. Aggressive cardiovascular risk for modification. Follow-up in 4-6 months. Patient understands the plan. All questions were answered to the patient's satisfaction. Medication Side Effects and Warnings were discussed with patient: yes  Patient Labs were reviewed and or requested:  yes  Patient Past Records were reviewed and or requested: yes    I appreciate the opportunity to be involved in Ms. Mullen. See note below for details. Please do not hesitate to contact us with questions or concerns. Joe Burgos MD    Cardiac Testing/ Procedures: A. Cardiac Cath/PCI:    B.ECHO/OBED: Echo 8/2018 LVEF 55 % to 60 %. No WMA. LAE. Mild TR  OBED: 8/15/18: No thrombus, no vegetation    C. StressNuclear/Stress ECHO/Stress test:    D.Vascular:    E. EP:    F. Miscellaneous:    Subjective:  Nat SPENCE Friend is a 76 y.o. female who returns for follow up visit. Patient antibiotics  Post discharge have been extended to 6 weeks and she still has a PICC line. Patient was recently admitted to [facility] for leukocytosis, bacteremia. She was also noted to be in atrial fibrillation during hospital admission. She finished her course of antibiotics. Also was started on Eliquis during recent hospital admission. No chest pain, dyspnea, palpitations, presyncope or syncope. No fever, chills. ROS:  (bold if positive, if negative)             Medications before admission:    Current Outpatient Prescriptions   Medication Sig Dispense    cefTRIAXone 2 gram, ADDaptor 1 Device IVPB Per Dr. Onita Simmonds     apixaban (ELIQUIS) 5 mg tablet Take 1 Tab by mouth two (2) times a day. 60 Tab     No current facility-administered medications for this visit. Family History of CAD:    Yes    Social History:  Current  Smoker  No    Physical Exam:  Visit Vitals    /80 (BP 1 Location: Left arm, BP Patient Position: Sitting)    Pulse 81    Resp 18    Ht 5' 8\" (1.727 m)    Wt 205 lb (93 kg)    SpO2 97%    BMI 31.17 kg/m2          Gen: Well-developed, well-nourished, in no acute distress  Neck: Supple,No JVD, No Carotid Bruit,   Resp: No accessory muscle use, Clear breath sounds, No rales or rhonchi  Card: Regular Rate,Rythm,Normal S1, S2, No murmurs, rubs or gallop. No thrills.    Abd:  Soft, non-tender, non-distended,BS+,   MSK: No cyanosis  Skin: No rashes    Neuro: moving all four extremities , follows commands appropriately  Psych:  Good insight, oriented to person, place , alert, Nml Affect  LE: No edema    EKG:        LABS:        Lab Results   Component Value Date/Time    WBC 15.7 (H) 08/20/2018 03:27 AM    HGB 13.0 08/20/2018 03:27 AM    HCT 38.3 08/20/2018 03:27 AM    PLATELET 923 (H) 83/65/6134 03:27 AM     Lab Results   Component Value Date/Time    Sodium 142 08/20/2018 03:27 AM    Potassium 3.9 08/20/2018 03:27 AM    Chloride 107 08/20/2018 03:27 AM    CO2 25 08/20/2018 03:27 AM    Anion gap 10 08/20/2018 03:27 AM    Glucose 101 (H) 08/20/2018 03:27 AM    BUN 4 (L) 08/20/2018 03:27 AM    Creatinine 0.84 08/20/2018 03:27 AM    BUN/Creatinine ratio 5 (L) 08/20/2018 03:27 AM    GFR est AA >60 08/20/2018 03:27 AM    GFR est non-AA >60 08/20/2018 03:27 AM    Calcium 9.8 08/20/2018 03:27 AM       No results found for: APTT  No results found for: INR, PTMR, PTP, PT1, PT2  No components found for: Yaima Farr MD

## 2018-09-13 NOTE — PROGRESS NOTES
Visit Vitals    /80 (BP 1 Location: Left arm, BP Patient Position: Sitting)    Pulse 81    Resp 18    Ht 5' 8\" (1.727 m)    Wt 205 lb (93 kg)    SpO2 97%    BMI 31.17 kg/m2

## 2018-09-17 ENCOUNTER — HOSPITAL ENCOUNTER (OUTPATIENT)
Dept: MAMMOGRAPHY | Age: 68
Discharge: HOME | End: 2018-09-17
Attending: INTERNAL MEDICINE
Payer: MEDICARE

## 2018-09-17 DIAGNOSIS — Z12.31 BREAST CANCER SCREENING BY MAMMOGRAM: ICD-10-CM

## 2018-09-17 PROCEDURE — 77067 SCR MAMMO BI INCL CAD: CPT

## 2018-09-18 LAB
CHOLEST SERPL-MCNC: 253 MG/DL (ref 100–199)
HDLC SERPL-MCNC: 54 MG/DL
INTERPRETATION, 910389: NORMAL
LDLC SERPL CALC-MCNC: 175 MG/DL (ref 0–99)
TRIGL SERPL-MCNC: 120 MG/DL (ref 0–149)
VLDLC SERPL CALC-MCNC: 24 MG/DL (ref 5–40)

## 2018-09-26 ENCOUNTER — OFFICE VISIT (OUTPATIENT)
Dept: FAMILY MEDICINE CLINIC | Age: 68
End: 2018-09-26

## 2018-09-26 VITALS
SYSTOLIC BLOOD PRESSURE: 155 MMHG | BODY MASS INDEX: 31.07 KG/M2 | HEIGHT: 68 IN | HEART RATE: 79 BPM | WEIGHT: 205 LBS | DIASTOLIC BLOOD PRESSURE: 78 MMHG | RESPIRATION RATE: 20 BRPM | TEMPERATURE: 98.2 F

## 2018-09-26 DIAGNOSIS — R78.81 BACTEREMIA DUE TO GRAM-POSITIVE BACTERIA: Primary | ICD-10-CM

## 2018-09-26 NOTE — MR AVS SNAPSHOT
[address] 
[phone] Patient: Foster January MRN: [id number] CBN:[id number] Visit Information Date & Time Provider Department Dept. Phone Encounter #  
 9/26/2018  2:00 PM Kimmy Mckeon, Amad[facility] [phone] [id number] Your Appointments 9/26/2018  2:00 PM  
ROUTINE CARE with DO Gaviota López [facility] ([address] [place] [address]) Appt Note: hosp f/u, tld 1:30; r/s from 10/03/18, tld 1:30  
 [address] [facility] [address]  
[phone]  
  
   
 [address] [place] [address] [address] [address]  
  
    
 11/2/2018  9:30 AM  
Complete Physical with Viviane Petit MD  
[facility] [address]) Appt Note: cpe  
 [address]  
  
   
 [address] [place] [address]  
  
    
 1/8/2019 10:20 AM  
ESTABLISHED PATIENT with Daniel Aiken MD  
[facility] ([address] [place] [address]) Appt Note: 4 mo fup  
 [address] [facility] [address] [facility]  
[address] [facility] [address] [address] Upcoming Health Maintenance Date Due Hepatitis C Screening 1950 DTaP/Tdap/Td series (1 - Tdap) 7/20/1971 Shingrix Vaccine Age 50> (1 of 2) 7/20/2000 FOBT Q 1 YEAR AGE 50-75 7/20/2000 GLAUCOMA SCREENING Q2Y 7/20/2015 Bone Densitometry (Dexa) Screening 7/20/2015 Pneumococcal 65+ High/Highest Risk (1 of 2 - PCV13) 7/20/2015 Influenza Age 5 to Adult 8/1/2018 MEDICARE YEARLY EXAM 8/8/2018 BREAST CANCER SCRN MAMMOGRAM 9/17/2020 Allergies as of 9/26/2018  Review Complete On: 9/26/2018 By: Rosamaria Acevedo Severity Noted Reaction Type Reactions Morphine  08/07/2018    Rash Current Immunizations  Never Reviewed No immunizations on file. Not reviewed this visit Vitals BP Pulse Temp Resp Height(growth percentile) Weight(growth percentile) 155/78 79 98.2 °F (36.8 °C) (Oral) 20 5' 8\" (1.727 m) 205 lb (93 kg) BMI OB Status Smoking Status 31.17 kg/m2 Hysterectomy Never Smoker Vitals History BMI and BSA Data Body Mass Index Body Surface Area  
 31.17 kg/m 2 2.11 m 2 Preferred Pharmacy Pharmacy Name Phone [facility] [address] [place] AT [facility] [phone] Your Updated Medication List  
  
   
This list is accurate as of 9/26/18  1:39 PM.  Always use your most recent med list.  
  
  
  
  
 apixaban 5 mg tablet Commonly known as:  Timmothy [place] Take 1 Tab by mouth two (2) times a day. cefTRIAXone 2 gram, ADDaptor 1 Device IVPB Per Dr. Debra Scott Introducing [facility]! Dear Gabriela Wynn: 
Thank you for requesting a "[employer] account. Our records indicate that you already have an active "[employer] account. You can access your account anytime at [email or web address] Did you know that you can access your hospital and ER discharge instructions at any time in "[employer]? You can also review all of your test results from your hospital stay or ER visit. Additional Information If you have questions, please visit the Frequently Asked Questions section of the "[employer] website at https://[employer]. Remember, "[employer] is NOT to be used for urgent needs. For medical emergencies, dial 911. Now available from your iPhone and Android! Please provide this summary of care documentation to your next provider. Your primary care clinician is listed as Bina Gonzalez. If you have any questions after today's visit, please call [phone].

## 2018-09-26 NOTE — PROGRESS NOTES
Chief Complaint   Patient presents with   [facility] Follow Up     bacteremia - completed IV antibotics on 09/23/2018

## 2018-09-27 NOTE — PROGRESS NOTES
[facility] Progress Note           Sophie Gardner DO    [phone] Office  [phone]  Fax    9/26/2018      Assessment & Plan:   1. Streptococcus constellatus bacteremia. Source not determined. Completed 6 weeks ceftriaxone 9/23/18. PICC removed. Patient given return to work note. Discussed risk of recurrent infection. RTO PRN          Subjective:     No complaints    Objective:     Vitals:   Visit Vitals    /78    Pulse 79    Temp 98.2 °F (36.8 °C) (Oral)    Resp 20    Ht 5' 8\" (1.727 m)    Wt 93 kg (205 lb)    BMI 31.17 kg/m2      Exam:       Physical Examination:   General:  Alert, cooperative, no distress   Head:  Normocephalic, atraumatic. Eyes:  Conjunctivae clear   Neck: Supple       Lungs:   No distress. Clear to auscultation bilaterally. Chest wall:     Heart:  Regular rate and rhythm   Abdomen:      Extremities: Moves all. Skin:  No rash   Neurologic: CNII-XII intact. Normal strength     Labs:        No lab exists for component: ITNL   No results for input(s): CPK, CKMB, TROIQ in the last 72 hours. No results for input(s): NA, K, CL, CO2, BUN, CREA, GLU, PHOS, MG, ALB, WBC, HGB, HCT, PLT, HGBEXT, HCTEXT, PLTEXT in the last 72 hours. No lab exists for component:  CA  No results for input(s): INR, PTP, APTT in the last 72 hours.     No lab exists for component: INREXT  Needs: urine analysis, urine sodium, protein and creatinine  No results found for: GILMER, CREAU      Cultures:     No results found for: SDES  Lab Results   Component Value Date/Time    Culture result: NO GROWTH 6 DAYS 08/16/2018 11:07 AM    Culture result: Culture performed on Unspun Fluid 08/14/2018 01:12 PM    Culture result: NO GROWTH ON SOLID MEDIA AT 4 DAYS 08/14/2018 01:12 PM    Culture result: NO GROWTH IN THIO BROTH AT 7 DAYS 08/14/2018 01:12 PM       Radiology:     Medications       Current Outpatient Prescriptions   Medication Sig Dispense    apixaban (ELIQUIS) 5 mg tablet Take 1 Tab by mouth two (2) times a day. 60 Tab    cefTRIAXone 2 gram, ADDaptor 1 Device IVPB Per Dr. Rah Rojas      No current facility-administered medications for this visit.             Case discussed with:      Brenden Blackwell, DO

## 2018-11-02 ENCOUNTER — OFFICE VISIT (OUTPATIENT)
Dept: INTERNAL MEDICINE CLINIC | Age: 68
End: 2018-11-02

## 2018-11-02 VITALS
SYSTOLIC BLOOD PRESSURE: 120 MMHG | HEIGHT: 68 IN | DIASTOLIC BLOOD PRESSURE: 75 MMHG | BODY MASS INDEX: 31.83 KG/M2 | WEIGHT: 210 LBS | RESPIRATION RATE: 16 BRPM | HEART RATE: 78 BPM | TEMPERATURE: 98.6 F | OXYGEN SATURATION: 98 %

## 2018-11-02 DIAGNOSIS — G89.29 CHRONIC PAIN OF RIGHT KNEE: ICD-10-CM

## 2018-11-02 DIAGNOSIS — E78.49 OTHER HYPERLIPIDEMIA: ICD-10-CM

## 2018-11-02 DIAGNOSIS — Z00.00 WELLNESS EXAMINATION: Primary | ICD-10-CM

## 2018-11-02 DIAGNOSIS — M25.561 CHRONIC PAIN OF RIGHT KNEE: ICD-10-CM

## 2018-11-02 DIAGNOSIS — E66.9 OBESITY, UNSPECIFIED CLASSIFICATION, UNSPECIFIED OBESITY TYPE, UNSPECIFIED WHETHER SERIOUS COMORBIDITY PRESENT: Chronic | ICD-10-CM

## 2018-11-02 DIAGNOSIS — R78.81 BACTEREMIA: ICD-10-CM

## 2018-11-02 RX ORDER — BISMUTH SUBSALICYLATE 262 MG
1 TABLET,CHEWABLE ORAL DAILY
COMMUNITY

## 2018-11-02 RX ORDER — GLUCOSAMINE SULFATE 1500 MG
POWDER IN PACKET (EA) ORAL DAILY
COMMUNITY

## 2018-11-02 RX ORDER — ATORVASTATIN CALCIUM 20 MG/1
20 TABLET, FILM COATED ORAL DAILY
Qty: 90 TAB | Refills: 1 | Status: SHIPPED | OUTPATIENT
Start: 2018-11-02

## 2018-11-02 RX ORDER — CHOLECALCIFEROL (VITAMIN D3) 125 MCG
100 CAPSULE ORAL DAILY
COMMUNITY

## 2018-11-02 NOTE — PROGRESS NOTES
Rachael Wallace is a 76 y.o. female who presents today for Complete Physical 
. She has a history of  
Patient Active Problem List  
Diagnosis Code  Bacteremia R78.81  Leukocytosis (leucocytosis) D72.829  
 Intractable headache R51  Obesity E66.9  Other hyperlipidemia E78.49 Laina Egan Today patient is here for CPE. R knee pain: chronic. No new injury to this right knee. Not have any weakness in her knee. Does not require any medication for this. Overall better Strep bacteremia: Source was never found. Patient placed on 6 weeks of IV antibiotics. This finished in late September. Since patient notes that she is feeling much better. Her energy is much better. He still does need to go see a dentist 
 
Afib: Did have a short run of A. fib while in the hospital.  Patient has been placed on an [address] with cardiology. Not obvious that she has had a recurrence of this. Seems like they are planning to do a event monitor to see if she has recurrence. Patient denies any signs of bleeding. Hyperlipidemia -discussed options also calculated her 10-year ASCVD risk. Patient agrees to start low-dose atorvastatin Noted on blood work. Lab Results Component Value Date/Time Cholesterol, total 253 (H) 09/17/2018 09:20 AM  
 HDL Cholesterol 54 09/17/2018 09:20 AM  
 LDL, calculated 175 (H) 09/17/2018 09:20 AM  
 VLDL, calculated 24 09/17/2018 09:20 AM  
 Triglyceride 120 09/17/2018 09:20 AM  
 
 
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 Takes care of mother who is 80. 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 No tobacco, No EtOH. , 2 children, 5 grandchildren Screening:  
 Colon cancer screening:  Last Colonoscopy: 2018 and   was normal 
 Breast cancer screening: last mammogram 2018 and   was normal 
 Cervical cancer screening: last PAP/Pelvic exam:N/A 
 Osteoporosis screening:  Last BMD:  Never. Immunizations: 
  
Immunization History Administered Date(s) Administered  Influenza High Dose Vaccine  10/19/2018 Immunization status: Prevnar due. Rx given. . 
 
 
ROS Review of Systems Constitutional: Negative for chills, fever and weight loss. Eyes: Negative for blurred vision, double vision, photophobia and pain. Respiratory: Negative for cough and shortness of breath. Cardiovascular: Negative for chest pain, palpitations and leg swelling. Gastrointestinal: Negative for abdominal pain, nausea and vomiting. Musculoskeletal: Positive for joint pain. Neurological: Negative. Endo/Heme/Allergies: Does not bruise/bleed easily. Psychiatric/Behavioral: Negative for depression. The patient is not nervous/anxious. Visit Vitals /75 (BP 1 Location: Left arm, BP Patient Position: Sitting) Pulse 78 Temp 98.6 °F (37 °C) (Oral) Resp 16 Ht 5' 8\" (1.727 m) Wt 210 lb (95.3 kg) SpO2 98% BMI 31.93 kg/m² Physical Exam  
Constitutional: She is oriented to person, place, and time. She appears well-developed and well-nourished. No distress. HENT:  
Head: Normocephalic and atraumatic. Neck: Normal range of motion. Neck supple. No thyromegaly present. Cardiovascular: Normal rate and regular rhythm. No murmur heard. Pulmonary/Chest: Effort normal and breath sounds normal. She has no wheezes. Abdominal: Soft. Bowel sounds are normal. She exhibits no distension. Musculoskeletal: She exhibits no edema. Neurological: She is alert and oriented to person, place, and time. No cranial nerve deficit. Skin: Skin is warm and dry. Psychiatric: She has a normal mood and affect. Her behavior is normal.  
 
 
 
Current Outpatient Medications Medication Sig  
 multivitamin (ONE A DAY) tablet Take 1 Tab by mouth daily.  cholecalciferol (VITAMIN D3) 1,000 unit cap Take  by mouth daily.  cranberry extract 450 mg tab tablet Take 450 mg by mouth.  calcium-cholecalciferol, d3, (CALCIUM 600 + D) 600-125 mg-unit tab Take  by mouth.  co-enzyme Q-10 (COQ-10) 100 mg capsule Take 100 mg by mouth daily.  omega 3-dha-epa-fish oil 100-160-1,000 mg cap Take  by mouth.  pneumococcal 13 rebeka conj dip (PREVNAR-13) 0.5 mL syrg injection 0.5 mL by IntraMUSCular route once for 1 dose.  atorvastatin (LIPITOR) 20 mg tablet Take 1 Tab by mouth daily.  apixaban (ELIQUIS) 5 mg tablet Take 1 Tab by mouth two (2) times a day. No current facility-administered medications for this visit. Past Medical History:  
Diagnosis Date  History of vascular access device 08/21/2018  
 4 Mongolian PICC line  Obesity 8/21/2018 Past Surgical History:  
Procedure Laterality Date  HX CYST INCISION AND DRAINAGE Left 20 yrs ago  HX GYN Hysterectomy Social History Tobacco Use  Smoking status: Never Smoker  Smokeless tobacco: Never Used Substance Use Topics  Alcohol use: No  
  
Family History Problem Relation Age of Onset  Diabetes Mother  Heart Attack Brother Allergies Allergen Reactions  Morphine Rash Assessment/Plan Diagnoses and all orders for this visit: 
 
1. Wellness examination -provided with prescription for Prevnar today. Will get a bone density scan. Iris A Friend was counseled on age-appropriate/ guideline-based risk prevention behaviors and screening for a 76y.o. year old   female . We also discussed adjustments in screening based on family history if necessary. Printed instructions for preventative screening guidelines and healthy behaviors given to patient with after visit summary. 
 
-     DEXA BONE DENSITY STUDY AXIAL; Future 2. Obesity, unspecified classification, unspecified obesity type, unspecified whether serious comorbidity present -counseled on diet and exercise and portion control. 3. Bacteremia -status post 6 weeks IV antibiotics feeling much better. 4. Other hyperlipidemia -she agrees to start on low-dose atorvastatin. Will check cholesterol in 3 months. -     atorvastatin (LIPITOR) 20 mg tablet; Take 1 Tab by mouth daily. 
-     LIPID PANEL; Future -     METABOLIC PANEL, COMPREHENSIVE; Future 5. Chronic pain of right knee -stable no red flags. Continue to monitor treat symptoms. Other orders -     pneumococcal 13 rebeka conj dip (PREVNAR-13) 0.5 mL syrg injection; 0.5 mL by IntraMUSCular route once for 1 dose. Follow-up Disposition: 
Return in about 3 months (around 2/2/2019). Xochitl Ramirez MD 
11/2/2018

## 2018-11-02 NOTE — PATIENT INSTRUCTIONS

## 2018-11-09 DIAGNOSIS — M81.0 OSTEOPOROSIS, UNSPECIFIED OSTEOPOROSIS TYPE, UNSPECIFIED PATHOLOGICAL FRACTURE PRESENCE: Primary | ICD-10-CM

## 2018-11-14 ENCOUNTER — HOSPITAL ENCOUNTER (OUTPATIENT)
Dept: MAMMOGRAPHY | Age: 68
Discharge: HOME | End: 2018-11-14
Attending: INTERNAL MEDICINE
Payer: COMMERCIAL

## 2018-11-14 DIAGNOSIS — M81.0 OSTEOPOROSIS, UNSPECIFIED OSTEOPOROSIS TYPE, UNSPECIFIED PATHOLOGICAL FRACTURE PRESENCE: ICD-10-CM

## 2018-11-14 PROCEDURE — 77080 DXA BONE DENSITY AXIAL: CPT

## 2019-01-07 NOTE — PROGRESS NOTES
Haroon Salinas MD    [id number] [place], [address] [facility]    Office [phone],[facility] [phone]  Pager [phone]    Ledy Buenrostro is a 76 y.o. female is here for f/u visit. Primary care physician:  Manfred Gleason MD    Patient Active Problem List   Diagnosis Code    Bacteremia R78.81    Leukocytosis (leucocytosis) D72.829    Intractable headache R51    Obesity E66.9    Other hyperlipidemia E78.49       Dear Dr. Annabella Loving,    I had the pleasure of seeing Ms. Johns A Friend in the office today. Chief complaint:  Chief Complaint   Patient presents with    Follow-up     Patient preesents today for a follow up visit for PAF. Assessment:    Paroxysmal atrial fibrillation  Chronic anticoagulation  Hospital admission 8/13/18 to 8/21/18 for bacteremia/leukocytosis. Also was diagnosed to have new onset atrial fibrillation during his admission. Plan:     In SR. Tolerating Eliquis. Her atrial fibrillation was  in the setting of hospital admission for infection. She has not had any symptoms since then. Will do a E cardio event monitor. If the event monitor does not  any arrhythmias, will discontinue the Eliquis and start her on aspirin 81 mg daily at that time. Aggressive cardiovascular risk for modification. Follow-up in 4-6 months. Patient understands the plan. All questions were answered to the patient's satisfaction. Medication Side Effects and Warnings were discussed with patient: yes  Patient Labs were reviewed and or requested:  yes  Patient Past Records were reviewed and or requested: yes    I appreciate the opportunity to be involved in Ms. Mullen. See note below for details. Please do not hesitate to contact us with questions or concerns. Haroon Salinas MD    Cardiac Testing/ Procedures: A. Cardiac Cath/PCI:    B.ECHO/OBED: Echo 8/2018 LVEF 55 % to 60 %. No WMA. LAE.  Mild TR  OBED: 8/15/18: No thrombus, no vegetation    C. StressNuclear/Stress ECHO/Stress test:    D.Vascular:    E. EP:    F. Miscellaneous:    Subjective:  Nat A Friend is a 76 y.o. female who returns for follow up visit. Doing well. No palpitations/chest pain. She does not have any symptoms of angina/dyspnea-will not proceed with stress test for now. Going on vacation twice later for 2 weeks    ROS:  (bold if positive, if negative)             Medications before admission:    Current Outpatient Medications   Medication Sig Dispense    multivitamin (ONE A DAY) tablet Take 1 Tab by mouth daily.  cholecalciferol (VITAMIN D3) 1,000 unit cap Take  by mouth daily.  cranberry extract 450 mg tab tablet Take 450 mg by mouth.  calcium-cholecalciferol, d3, (CALCIUM 600 + D) 600-125 mg-unit tab Take  by mouth.  co-enzyme Q-10 (COQ-10) 100 mg capsule Take 100 mg by mouth daily.  omega 3-dha-epa-fish oil 100-160-1,000 mg cap Take  by mouth.  atorvastatin (LIPITOR) 20 mg tablet Take 1 Tab by mouth daily. 90 Tab    apixaban (ELIQUIS) 5 mg tablet Take 1 Tab by mouth two (2) times a day. 60 Tab     No current facility-administered medications for this visit. Family History of CAD:    Yes    Social History:  Current  Smoker  No    Physical Exam:  Visit Vitals  /80 (BP 1 Location: Left arm, BP Patient Position: Sitting)   Pulse 64   Resp 18   Ht 5' 8\" (1.727 m)   Wt 213 lb 6.4 oz (96.8 kg)   SpO2 98%   BMI 32.45 kg/m²          Gen: Well-developed, well-nourished, in no acute distress  Neck: Supple,No JVD, No Carotid Bruit,   Resp: No accessory muscle use, Clear breath sounds, No rales or rhonchi  Card: Regular Rate,Rythm,Normal S1, S2, No murmurs, rubs or gallop. No thrills.    Abd:  Soft, non-tender, non-distended,BS+,   MSK: No cyanosis  Skin: No rashes    Neuro: moving all four extremities , follows commands appropriately  Psych:  Good insight, oriented to person, place , alert, Nml Affect  LE: No edema    EKG: LABS:        Lab Results   Component Value Date/Time    WBC 15.7 (H) 08/20/2018 03:27 AM    HGB 13.0 08/20/2018 03:27 AM    HCT 38.3 08/20/2018 03:27 AM    PLATELET 712 (H) 10/04/1264 03:27 AM     Lab Results   Component Value Date/Time    Sodium 142 08/20/2018 03:27 AM    Potassium 3.9 08/20/2018 03:27 AM    Chloride 107 08/20/2018 03:27 AM    CO2 25 08/20/2018 03:27 AM    Anion gap 10 08/20/2018 03:27 AM    Glucose 101 (H) 08/20/2018 03:27 AM    BUN 4 (L) 08/20/2018 03:27 AM    Creatinine 0.84 08/20/2018 03:27 AM    BUN/Creatinine ratio 5 (L) 08/20/2018 03:27 AM    GFR est AA >60 08/20/2018 03:27 AM    GFR est non-AA >60 08/20/2018 03:27 AM    Calcium 9.8 08/20/2018 03:27 AM       No results found for: APTT  No results found for: INR, PTMR, PTP, PT1, PT2  No components found for: Conrad Mauro MD

## 2019-01-08 ENCOUNTER — OFFICE VISIT (OUTPATIENT)
Dept: CARDIOLOGY CLINIC | Age: 69
End: 2019-01-08

## 2019-01-08 ENCOUNTER — TELEPHONE (OUTPATIENT)
Dept: CARDIOLOGY CLINIC | Age: 69
End: 2019-01-08

## 2019-01-08 VITALS
OXYGEN SATURATION: 98 % | WEIGHT: 213.4 LBS | SYSTOLIC BLOOD PRESSURE: 134 MMHG | HEIGHT: 68 IN | RESPIRATION RATE: 18 BRPM | HEART RATE: 64 BPM | BODY MASS INDEX: 32.34 KG/M2 | DIASTOLIC BLOOD PRESSURE: 80 MMHG

## 2019-01-08 DIAGNOSIS — I48.0 PAF (PAROXYSMAL ATRIAL FIBRILLATION) (HCC): Primary | ICD-10-CM

## 2019-01-08 NOTE — PROGRESS NOTES
Chief Complaint   Patient presents with    Follow-up     Patient preesents today for a follow up visit for PAF. Visit Vitals  /80 (BP 1 Location: Left arm, BP Patient Position: Sitting)   Pulse 64   Resp 18   Ht 5' 8\" (1.727 m)   Wt 213 lb 6.4 oz (96.8 kg)   SpO2 98%   BMI 32.45 kg/m²       Patient presents today for a follow up visit for PAF. Patient states she is doing well has no cardiac concerns to discuss.

## 2019-01-08 NOTE — TELEPHONE ENCOUNTER
Patient needs a E Cardio Event Monitor per Dr. Kadeem Martinez office visit notes on 1/8/19 for PAF.

## 2019-01-09 ENCOUNTER — CLINICAL SUPPORT (OUTPATIENT)
Dept: CARDIOLOGY CLINIC | Age: 69
End: 2019-01-09

## 2019-01-09 DIAGNOSIS — I48.91 ATRIAL FIBRILLATION, UNSPECIFIED TYPE (HCC): Primary | ICD-10-CM

## 2019-02-02 DIAGNOSIS — E78.49 OTHER HYPERLIPIDEMIA: ICD-10-CM

## 2019-06-17 RX ORDER — APIXABAN 5 MG/1
TABLET, FILM COATED ORAL
Qty: 60 TAB | Refills: 0 | Status: SHIPPED | OUTPATIENT
Start: 2019-06-17

## 2021-04-02 ENCOUNTER — APPOINTMENT (OUTPATIENT)
Dept: GENERAL RADIOLOGY | Age: 71
End: 2021-04-02
Attending: EMERGENCY MEDICINE
Payer: OTHER MISCELLANEOUS

## 2021-04-02 ENCOUNTER — HOSPITAL ENCOUNTER (EMERGENCY)
Age: 71
Discharge: HOME | End: 2021-04-02
Attending: EMERGENCY MEDICINE
Payer: OTHER MISCELLANEOUS

## 2021-04-02 VITALS
OXYGEN SATURATION: 97 % | RESPIRATION RATE: 17 BRPM | HEART RATE: 69 BPM | TEMPERATURE: 98.3 F | DIASTOLIC BLOOD PRESSURE: 83 MMHG | SYSTOLIC BLOOD PRESSURE: 146 MMHG

## 2021-04-02 DIAGNOSIS — S61.411A LACERATION OF RIGHT HAND WITHOUT FOREIGN BODY, INITIAL ENCOUNTER: Primary | ICD-10-CM

## 2021-04-02 PROCEDURE — 74011000250 HC RX REV CODE- 250: Performed by: EMERGENCY MEDICINE

## 2021-04-02 PROCEDURE — 90471 IMMUNIZATION ADMIN: CPT

## 2021-04-02 PROCEDURE — 99284 EMERGENCY DEPT VISIT MOD MDM: CPT

## 2021-04-02 PROCEDURE — 74011250636 HC RX REV CODE- 250/636: Performed by: EMERGENCY MEDICINE

## 2021-04-02 PROCEDURE — 73110 X-RAY EXAM OF WRIST: CPT

## 2021-04-02 PROCEDURE — 75810000293 HC SIMP/SUPERF WND  RPR

## 2021-04-02 PROCEDURE — 90715 TDAP VACCINE 7 YRS/> IM: CPT | Performed by: EMERGENCY MEDICINE

## 2021-04-02 RX ORDER — BACITRACIN 500 UNIT/G
1 PACKET (EA) TOPICAL 3 TIMES DAILY
Status: DISCONTINUED | OUTPATIENT
Start: 2021-04-02 | End: 2021-04-02 | Stop reason: HOSPADM

## 2021-04-02 RX ORDER — LIDOCAINE HYDROCHLORIDE AND EPINEPHRINE 10; 10 MG/ML; UG/ML
10 INJECTION, SOLUTION INFILTRATION; PERINEURAL ONCE
Status: COMPLETED | OUTPATIENT
Start: 2021-04-02 | End: 2021-04-02

## 2021-04-02 RX ADMIN — TETANUS TOXOID, REDUCED DIPHTHERIA TOXOID AND ACELLULAR PERTUSSIS VACCINE, ADSORBED 0.5 ML: 5; 2.5; 8; 8; 2.5 SUSPENSION INTRAMUSCULAR at 09:33

## 2021-04-02 RX ADMIN — BACITRACIN 1 PACKET: 500 OINTMENT TOPICAL at 09:33

## 2021-04-02 RX ADMIN — LIDOCAINE HYDROCHLORIDE,EPINEPHRINE BITARTRATE 100 MG: 10; .01 INJECTION, SOLUTION INFILTRATION; PERINEURAL at 09:33

## 2021-04-02 NOTE — ED TRIAGE NOTES
Patient arrives to ED for complaints of fall with wound to right hand    Patient states she was walking, tripped and her keys punctured her right hand     Hand was dressed with bleeding controlled at time of arrival    Dressing removed, wound cleansed

## 2021-04-02 NOTE — ED PROVIDER NOTES
Please note that this dictation was completed with [employer], the computer voice recognition software.  Quite often unanticipated grammatical, syntax, homophones, and other interpretive errors are inadvertently transcribed by the computer software.  Please disregard these errors.  Please excuse any errors that have escaped final proofreading. Patient is a 79-year-old female with history of paroxysmal atrial fibrillation and hyperlipidemia presenting to emergency department for evaluation of laceration to the right proximal palm with onset just prior to arrival.  Patient states that she was walking into work, rolled her ankle, and fell forward, she fell onto her right outstretched hand, and believes that she was holding her keys that may have punctured her skin. Denies blow to head or loss of consciousness. She denies pain in the ankles, trouble walking, wrist pain, numbness, tingling, decreased range of motion, or any other medical points at this time. Unknown last tetanus shot.            Past Medical History:   Diagnosis Date    History of vascular access device 08/21/2018    4 [place]ian PICC line    Obesity 8/21/2018       Past Surgical History:   Procedure Laterality Date    COLONOSCOPY N/A 8/17/2018    COLONOSCOPY performed by Robbie Hernandez MD at [address] [facility] HX CYST INCISION AND DRAINAGE Left 20 yrs ago   [facility] HX GYN      Hysterectomy         Family History:   Problem Relation Age of Onset    Diabetes Mother     Heart Attack Brother        Social History     Socioeconomic History    Marital status: SINGLE     Spouse name: Not on file    Number of children: Not on file    Years of education: Not on file    Highest education level: Not on file   Occupational History    Not on file   Social Needs    Financial resource strain: Not on file    Food insecurity     Worry: Not on file     Inability: Not on file    Transportation needs     Medical: Not on file     Non-medical: Not on file   Tobacco Use    Smoking status: Never Smoker    Smokeless tobacco: Never Used   Substance and Sexual Activity    Alcohol use: No    Drug use: No    Sexual activity: Not on file   Lifestyle    Physical activity     Days per week: Not on file     Minutes per session: Not on file    Stress: Not on file   Relationships    Social connections     Talks on phone: Not on file     Gets together: Not on file     Attends [employer] service: Not on file     Active member of club or organization: Not on file     Attends meetings of clubs or organizations: Not on file     Relationship status: Not on file    Intimate partner violence     Fear of current or ex partner: Not on file     Emotionally abused: Not on file     Physically abused: Not on file     Forced sexual activity: Not on file   Other Topics Concern    Not on file   Social History Narrative    Not on file         ALLERGIES: Morphine    Review of Systems   Constitutional: Negative for chills and fever. HENT: Negative for ear pain and sore throat. Eyes: Negative for visual disturbance. Respiratory: Negative for cough and shortness of breath. Cardiovascular: Negative for chest pain. Gastrointestinal: Negative for abdominal pain. Genitourinary: Negative for flank pain. Musculoskeletal: Negative for back pain. Skin: Positive for wound. Negative for color change. Neurological: Negative for dizziness and headaches. Psychiatric/Behavioral: Negative for confusion. There were no vitals filed for this visit. Physical Exam  Vitals signs and nursing note reviewed. Constitutional:       General: She is not in acute distress. Appearance: Normal appearance. She is not ill-appearing. HENT:      Head: Normocephalic and atraumatic. Mouth/Throat:      Pharynx: Oropharynx is clear. Eyes:      Extraocular Movements: Extraocular movements intact. Conjunctiva/sclera: Conjunctivae normal.   Neck:      Musculoskeletal: No neck rigidity. Cardiovascular:      Rate and Rhythm: Normal rate and regular rhythm. Pulmonary:      Effort: Pulmonary effort is normal.      Breath sounds: Normal breath sounds. Abdominal:      Palpations: Abdomen is soft. Tenderness: There is no abdominal tenderness. Musculoskeletal: Normal range of motion. Right elbow: Normal.     Right wrist: She exhibits tenderness and bony tenderness. She exhibits normal range of motion, no swelling, no effusion, no crepitus, no deformity and no laceration. Left wrist: Normal.      Right ankle: Normal.      Left ankle: Normal.      Right hand: She exhibits tenderness and laceration. She exhibits normal range of motion, no bony tenderness and normal capillary refill. Normal sensation noted. Normal strength noted. Hands:    Skin:     General: Skin is warm and dry. Neurological:      General: No focal deficit present. Mental Status: She is alert and oriented to person, place, and time. Psychiatric:         Mood and Affect: Mood normal.          MDM  Number of Diagnoses or Management Options  Diagnosis management comments: Patient is alert, well-appearing, afebrile, vital stable. Mechanical ground-level fall, [address], with laceration to the right palm. No blow to head or loss of consciousness. She has slight tenderness to the right wrist, no visible deformity, and 1 cm laceration to the palm. No neurovascular or motor deficits to the extremity. X-ray negative for acute bony injury or foreign body. The wound is cleansed, irrigated, and debrided of foreign material as much as possible. Wound edges approximated, antibiotic ointment and dressing applied. See procedure note for details. The patient tolerated entire procedure well and is alerted to watch for any signs of infection (redness, pus, pain, increased swelling or fever) and call if such occurs. Home wound care instructions are provided. Tetanus vaccination status reviewed:  Td vaccination indicated and given today. Amount and/or Complexity of Data Reviewed  Tests in the radiology section of CPT®: reviewed      ED Course as of Apr 02 1044 Fri Apr 02, 2021   1043 IMPRESSION  No radiographic evidence of acute abnormality. XR WRIST RIGHT AP/LATERAL/OBLIQUE [EP]      ED Course User Index  [EP] JOSE ELIAS Mazariegos     10:45 AM  Pt has been reevaluated. There are no new complaints, changes, or physical findings at this time. Medications have been reviewed w/ pt and/or family. Pt and/or family's questions have been answered. Pt and/or family expressed good understanding of the dx/tx/rx and is in agreement with plan of care. Pt instructed and agreed to f/u w/ PCP and to return to ED upon further deterioration. Pt is ready for discharge. IMPRESSION:  1. Laceration of right hand without foreign body, initial encounter        PLAN:  1. Current Discharge Medication List        2. Follow-up Information     Follow up With Specialties Details Why Contact Info    Tip Hanks MD Internal Medicine Go in 7 days For suture removal, 7-10 days [place] [id number]  [address] [facility]  [phone]              Return to ED if worse       Wound Repair    Date/Time: 4/2/2021 10:44 AM  Performed by: PAPreparation: skin prepped with Betadine  Location details: right hand  Wound length:2.5 cm or less  Anesthesia: local infiltration    Anesthesia:  Local Anesthetic: lidocaine 1% with epinephrine  Anesthetic total: 4 mL  Foreign bodies: no foreign bodies  Irrigation solution: saline  Debridement: minimal  Skin closure: 4-0 nylon  Number of sutures: 3  Technique: simple and interrupted  Dressing: antibiotic ointment and gauze roll  Patient tolerance: patient tolerated the procedure well with no immediate complications  My total time at bedside, performing this procedure was 1-15 minutes.

## 2021-04-02 NOTE — ED NOTES
Wound cleansed and sutured by provider    Discharge paperwork provided and reviewed     Patient ambulatory out of ED

## 2022-03-19 PROBLEM — R51.9 INTRACTABLE HEADACHE: Status: ACTIVE | Noted: 2018-08-13

## 2022-03-19 PROBLEM — E66.9 OBESITY: Status: ACTIVE | Noted: 2018-08-21

## 2022-03-19 PROBLEM — R78.81 BACTEREMIA: Status: ACTIVE | Noted: 2018-08-13

## 2022-03-20 PROBLEM — E78.49 OTHER HYPERLIPIDEMIA: Status: ACTIVE | Noted: 2018-11-02

## 2022-03-20 PROBLEM — D72.829 LEUKOCYTOSIS (LEUCOCYTOSIS): Status: ACTIVE | Noted: 2018-08-13

## 2023-05-10 RX ORDER — UBIDECARENONE 100 MG
100 CAPSULE ORAL DAILY
COMMUNITY

## 2023-05-10 RX ORDER — ATORVASTATIN CALCIUM 20 MG/1
TABLET, FILM COATED ORAL DAILY
COMMUNITY
Start: 2018-11-02

## (undated) DEVICE — SIMPLICITY FLUFF UNDERPAD 23X36, MODERATE: Brand: SIMPLICITY

## (undated) DEVICE — BASIN EMSIS 16OZ GRAPHITE PLAS KID SHP MOLD GRAD FOR ORAL

## (undated) DEVICE — SNARE ENDOSCP M L240CM W27MM SHTH DIA2.4MM CHN 2.8MM OVL

## (undated) DEVICE — SOLIDIFIER MEDC 1200ML -- CONVERT TO 356117

## (undated) DEVICE — ADULT SPO2 SENSOR: Brand: NELLCOR

## (undated) DEVICE — 1200 GUARD II KIT W/5MM TUBE W/O VAC TUBE: Brand: GUARDIAN

## (undated) DEVICE — BAG SPEC BIOHZRD 10 X 10 IN --

## (undated) DEVICE — KENDALL RADIOLUCENT FOAM MONITORING ELECTRODE -RECTANGULAR SHAPE: Brand: KENDALL

## (undated) DEVICE — CONTAINER SPEC 20 ML LID NEUT BUFF FORMALIN 10 % POLYPR STS

## (undated) DEVICE — TRAP SUC MUCOUS 70ML -- MEDICHOICE MEDLINE

## (undated) DEVICE — SET GRAV CK VLV NEEDLESS ST 3 GANGED 4WAY STPCOCK HI FLO 10

## (undated) DEVICE — CANN NASAL O2 CAPNOGRAPHY AD -- FILTERLINE

## (undated) DEVICE — KIT COLON W/ 1.1OZ LUB AND 2 END

## (undated) DEVICE — Device